# Patient Record
Sex: FEMALE | Race: WHITE | Employment: FULL TIME | URBAN - NONMETROPOLITAN AREA
[De-identification: names, ages, dates, MRNs, and addresses within clinical notes are randomized per-mention and may not be internally consistent; named-entity substitution may affect disease eponyms.]

---

## 2017-05-17 ENCOUNTER — OFFICE VISIT (OUTPATIENT)
Dept: FAMILY MEDICINE CLINIC | Age: 32
End: 2017-05-17

## 2017-05-17 VITALS
HEIGHT: 60 IN | WEIGHT: 129 LBS | DIASTOLIC BLOOD PRESSURE: 66 MMHG | SYSTOLIC BLOOD PRESSURE: 120 MMHG | HEART RATE: 82 BPM | BODY MASS INDEX: 25.32 KG/M2 | OXYGEN SATURATION: 98 %

## 2017-05-17 DIAGNOSIS — I47.1 PSVT (PAROXYSMAL SUPRAVENTRICULAR TACHYCARDIA) (HCC): Primary | ICD-10-CM

## 2017-05-17 DIAGNOSIS — R42 DIZZINESS: ICD-10-CM

## 2017-05-17 DIAGNOSIS — R53.83 OTHER FATIGUE: ICD-10-CM

## 2017-05-17 DIAGNOSIS — R29.898 LEFT ARM WEAKNESS: ICD-10-CM

## 2017-05-17 DIAGNOSIS — D68.51 FACTOR V LEIDEN (HCC): ICD-10-CM

## 2017-05-17 PROBLEM — I47.10 PSVT (PAROXYSMAL SUPRAVENTRICULAR TACHYCARDIA): Status: ACTIVE | Noted: 2017-05-17

## 2017-05-17 LAB
BASOPHILS ABSOLUTE: 0.1 K/UL (ref 0–0.2)
BASOPHILS RELATIVE PERCENT: 2.1 %
EOSINOPHILS ABSOLUTE: 0.3 K/UL (ref 0–0.6)
EOSINOPHILS RELATIVE PERCENT: 6.8 %
HCT VFR BLD CALC: 43.7 % (ref 36–48)
HEMOGLOBIN: 14.6 G/DL (ref 12–16)
LYMPHOCYTES ABSOLUTE: 1.2 K/UL (ref 1–5.1)
LYMPHOCYTES RELATIVE PERCENT: 23.8 %
MCH RBC QN AUTO: 30.6 PG (ref 26–34)
MCHC RBC AUTO-ENTMCNC: 33.5 G/DL (ref 31–36)
MCV RBC AUTO: 91.3 FL (ref 80–100)
MONOCYTES ABSOLUTE: 0.4 K/UL (ref 0–1.3)
MONOCYTES RELATIVE PERCENT: 7.8 %
NEUTROPHILS ABSOLUTE: 3 K/UL (ref 1.7–7.7)
NEUTROPHILS RELATIVE PERCENT: 59.5 %
PDW BLD-RTO: 13 % (ref 12.4–15.4)
PLATELET # BLD: 246 K/UL (ref 135–450)
PMV BLD AUTO: 10 FL (ref 5–10.5)
RBC # BLD: 4.79 M/UL (ref 4–5.2)
WBC # BLD: 5 K/UL (ref 4–11)

## 2017-05-17 PROCEDURE — 99214 OFFICE O/P EST MOD 30 MIN: CPT | Performed by: FAMILY MEDICINE

## 2017-05-17 PROCEDURE — 36415 COLL VENOUS BLD VENIPUNCTURE: CPT | Performed by: FAMILY MEDICINE

## 2017-05-17 RX ORDER — DILTIAZEM HYDROCHLORIDE 180 MG/1
180 CAPSULE, COATED, EXTENDED RELEASE ORAL DAILY
Qty: 30 CAPSULE | Refills: 3 | Status: SHIPPED | OUTPATIENT
Start: 2017-05-17 | End: 2017-12-01 | Stop reason: ALTCHOICE

## 2017-05-17 RX ORDER — CETIRIZINE HYDROCHLORIDE 10 MG/1
10 TABLET ORAL DAILY
COMMUNITY

## 2017-05-17 ASSESSMENT — ENCOUNTER SYMPTOMS
CONSTIPATION: 0
DIARRHEA: 0
TROUBLE SWALLOWING: 0

## 2017-05-18 LAB
A/G RATIO: 1.8 (ref 1.1–2.2)
ALBUMIN SERPL-MCNC: 4.9 G/DL (ref 3.4–5)
ALP BLD-CCNC: 67 U/L (ref 40–129)
ALT SERPL-CCNC: 10 U/L (ref 10–40)
ANION GAP SERPL CALCULATED.3IONS-SCNC: 16 MMOL/L (ref 3–16)
AST SERPL-CCNC: 12 U/L (ref 15–37)
BILIRUB SERPL-MCNC: <0.2 MG/DL (ref 0–1)
BUN BLDV-MCNC: 13 MG/DL (ref 7–20)
CALCIUM SERPL-MCNC: 9.7 MG/DL (ref 8.3–10.6)
CHLORIDE BLD-SCNC: 101 MMOL/L (ref 99–110)
CO2: 25 MMOL/L (ref 21–32)
CREAT SERPL-MCNC: <0.5 MG/DL (ref 0.6–1.1)
GFR AFRICAN AMERICAN: >60
GFR NON-AFRICAN AMERICAN: >60
GLOBULIN: 2.7 G/DL
GLUCOSE BLD-MCNC: 77 MG/DL (ref 70–99)
POTASSIUM SERPL-SCNC: 4.3 MMOL/L (ref 3.5–5.1)
SODIUM BLD-SCNC: 142 MMOL/L (ref 136–145)
T4 FREE: 1.2 NG/DL (ref 0.9–1.8)
TOTAL PROTEIN: 7.6 G/DL (ref 6.4–8.2)
TSH SERPL DL<=0.05 MIU/L-ACNC: 1.67 UIU/ML (ref 0.27–4.2)
VITAMIN B-12: 676 PG/ML (ref 211–911)

## 2017-05-26 DIAGNOSIS — R42 DIZZINESS: Primary | ICD-10-CM

## 2017-09-08 ENCOUNTER — OFFICE VISIT (OUTPATIENT)
Dept: FAMILY MEDICINE CLINIC | Age: 32
End: 2017-09-08

## 2017-09-08 VITALS
BODY MASS INDEX: 26.03 KG/M2 | DIASTOLIC BLOOD PRESSURE: 78 MMHG | HEART RATE: 102 BPM | TEMPERATURE: 98.2 F | OXYGEN SATURATION: 99 % | HEIGHT: 60 IN | WEIGHT: 132.6 LBS | SYSTOLIC BLOOD PRESSURE: 112 MMHG

## 2017-09-08 DIAGNOSIS — J01.00 ACUTE NON-RECURRENT MAXILLARY SINUSITIS: Primary | ICD-10-CM

## 2017-09-08 PROCEDURE — 99213 OFFICE O/P EST LOW 20 MIN: CPT | Performed by: NURSE PRACTITIONER

## 2017-09-08 RX ORDER — DOXYCYCLINE HYCLATE 100 MG/1
100 CAPSULE ORAL 2 TIMES DAILY
Qty: 14 CAPSULE | Refills: 0 | Status: SHIPPED | OUTPATIENT
Start: 2017-09-08 | End: 2017-12-01 | Stop reason: SDUPTHER

## 2017-09-08 ASSESSMENT — ENCOUNTER SYMPTOMS
SINUS PRESSURE: 1
VOMITING: 0
CHEST TIGHTNESS: 1
SORE THROAT: 0
EYES NEGATIVE: 1
COUGH: 1
NAUSEA: 1

## 2017-11-16 ENCOUNTER — OFFICE VISIT (OUTPATIENT)
Dept: CARDIOLOGY CLINIC | Age: 32
End: 2017-11-16

## 2017-11-16 VITALS
HEART RATE: 88 BPM | WEIGHT: 137 LBS | BODY MASS INDEX: 26.9 KG/M2 | DIASTOLIC BLOOD PRESSURE: 68 MMHG | SYSTOLIC BLOOD PRESSURE: 102 MMHG | OXYGEN SATURATION: 98 % | HEIGHT: 60 IN

## 2017-11-16 DIAGNOSIS — I47.1 PSVT (PAROXYSMAL SUPRAVENTRICULAR TACHYCARDIA) (HCC): Primary | ICD-10-CM

## 2017-11-16 PROCEDURE — G8417 CALC BMI ABV UP PARAM F/U: HCPCS | Performed by: INTERNAL MEDICINE

## 2017-11-16 PROCEDURE — G8484 FLU IMMUNIZE NO ADMIN: HCPCS | Performed by: INTERNAL MEDICINE

## 2017-11-16 PROCEDURE — G8427 DOCREV CUR MEDS BY ELIG CLIN: HCPCS | Performed by: INTERNAL MEDICINE

## 2017-11-16 PROCEDURE — 1036F TOBACCO NON-USER: CPT | Performed by: INTERNAL MEDICINE

## 2017-11-16 PROCEDURE — 99214 OFFICE O/P EST MOD 30 MIN: CPT | Performed by: INTERNAL MEDICINE

## 2017-11-16 RX ORDER — METOPROLOL SUCCINATE 25 MG/1
25 TABLET, EXTENDED RELEASE ORAL DAILY
COMMUNITY
End: 2018-11-20 | Stop reason: SDUPTHER

## 2017-11-16 NOTE — PROGRESS NOTES
to continue this route. Dr. Hortensia Edgar recommended anticoagulation only if has 2nd episode of thrombosis. She now wants guidance from me regarding how to proceed. Past Medical History:   has a past medical history of Blood clot in vein and SVT (supraventricular tachycardia) ([address] Surgical History:   has a past surgical history that includes  section and Tonsillectomy. Social History:   She lives at home with her spouse and 3 children. She reports that she has never smoked. She does not have any smokeless tobacco history on file. She reports that she does not drink alcohol. Family History:  family history includes High Blood Pressure in her mother. Home Medications:  Prior to Admission medications    Medication Sig Start Date End Date Taking? Authorizing Provider   metoprolol (LOPRESSOR) 25 MG tablet Take 25 mg by mouth daily. Yes Historical Provider, MD        Allergies:  Dilaudid [hydromorphone hcl]; Amoxicillin; Bactrim [sulfamethoxazole-trimethoprim]; Ceclor [cefaclor]; Cephalexin; Clindamycin/lincomycin; Sulfa antibiotics; and Zithromax [azithromycin]     Review of Systems:   · Constitutional: there has been no unanticipated weight loss. There's been no change in energy level, sleep pattern, or activity level. · Eyes: No visual changes or diplopia. No scleral icterus. · ENT: No Headaches, hearing loss or vertigo. No mouth sores or sore throat. · Cardiovascular: Reviewed in HPI  · Respiratory: No cough or wheezing, no sputum production. No hematemesis. · Gastrointestinal: No abdominal pain, appetite loss, blood in stools. No change in bowel or bladder habits. · Genitourinary: No dysuria, trouble voiding, or hematuria. · Musculoskeletal:  No gait disturbance, weakness or joint complaints. · Integumentary: No rash or pruritis. · Neurological: No headache, diplopia, change in muscle strength, numbness or tingling.  No change in gait, balance, coordination, mood, affect, regarding thrombosis potential then she wants ablation and I believe this is reasonable given young age and still having symptoms despite medication. 2. SVT (supraventricular tachycardia):  See #1 above. Most recent EKG 1/9/17 ectopic atrial tachycardia; nonspecific ST changeNote EKG 4/12/2016 showed ectopic atrial rhythm. 3.  Chest pain: Likely related to tachycardia. Low suspicion for CAD in young female with no CAD risk factors. Plan:   1. Advised to follow up with Dr Tammy Fracno and ask his opinion about SVT ablation any complications with blood clots or bleeding issues. Once released she should proceed with ablation if felt OK from hematology standpoint and I will refer back to Dr. Demarcus Falcon and d/w him. 2.  Follow up with Dr Demarcus Falcon  for further evaluation and scheduling ablation  3. Follow up with me if needed after EP sees. Cost of prescription medications and patient compliance have been reviewed with patient. All questions answered. Thank you for allowing me to participate in the care of this individual.    Emma Ace.  Romario Walters M.D., [facility] - [place], [place]

## 2017-11-16 NOTE — PATIENT INSTRUCTIONS
Plan:   1. Advised to follow up with Dr Enoch Noble and ask his opinion about SVT ablation any complications with blood clots or bleeding issues. Once released she should proceed with abaltion  2. Follow up with Dr Víctor Weaver  for further evaluation and scheduling ablation  3.  Follow up with me prn

## 2017-11-17 ENCOUNTER — TELEPHONE (OUTPATIENT)
Dept: CARDIOLOGY CLINIC | Age: 32
End: 2017-11-17

## 2017-11-17 NOTE — TELEPHONE ENCOUNTER
SALEEM  Pt was wanted to let smm know that her Metoprolol was the Metoprolol Succinate. Please call if need to.

## 2017-11-28 NOTE — COMMUNICATION BODY
[facility]   Cardiac Followup    Referring Provider:  Boris Keenan MD     Chief Complaint   Patient presents with    1 Year Follow Up     ekg 01/09/2017    Results     carotid 06/01/2017    Discuss Labs     cmp 05/17/2017    Chest Pain     here & there    Dizziness    Numbness     left arm & left leg tingling    Fatigue     no energy      Subjective. Patient being seen today for cardiology follow up of SVT, palpitations    History of Present Illness:   Mrs. Letitia Guthrie is a 28yo female who has hx of SVT related to pregnancy and I first saw patient 12 year ago. 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 Nelson Ordaz and did not f/u afterwards. Sounds like she had testing for hypercoagulable d/o but she is not aware of any diagnosis. She was seen at [facility] ER on 6/14/12 for palpitations. She was given Lopressor 2.5 mg IV for what was diagnosed as sinus tachycardia. All her labs, urine, x-rays were WNL. Her EKG was NSR- bpm as high as 129 bpm.  The 2 EKGs demonstrated sinus tach and no evidence for SVT. Just followed clinically without meds at that time. Note EKG 4/12/2016 showed probable ectopic atrial rhythm 98bpm. Most recent 2 week monitor 10/11-10/24/16 showed narrow complex tachycardia HR 80-150bpm.  Most recent EKG 1/9/17 ectopic atrial tachycardia; nonspecific ST change. Most recent carotid US 6/1/17 was essentially normal.   Today, she reports having bouts of palpitations with heart racing daily still that bother her. She reports chest discomfort and dizziness with palps and \"fell\" down last week due to symptoms. No definite LOC. She reports last episode lasted about 1/2 hr.   She was seen by EP and was afraid of ablation and pursued 2nd opinion with Dr Alexia Eng. He was concerned about her thrombosis history and referred her to heme/onc Dr. Earnest Mcdonald and his EP partner.  The EP doc recommended staying on medication but she does not want to continue this route. Dr. Bisi Watson recommended anticoagulation only if has 2nd episode of thrombosis. She now wants guidance from me regarding how to proceed. Past Medical History:   has a past medical history of Blood clot in vein and SVT (supraventricular tachycardia) ([address] Surgical History:   has a past surgical history that includes  section and Tonsillectomy. Social History:   She lives at home with her spouse and 3 children. She reports that she has never smoked. She does not have any smokeless tobacco history on file. She reports that she does not drink alcohol. Family History:  family history includes High Blood Pressure in her mother. Home Medications:  Prior to Admission medications    Medication Sig Start Date End Date Taking? Authorizing Provider   metoprolol (LOPRESSOR) 25 MG tablet Take 25 mg by mouth daily. Yes Historical Provider, MD        Allergies:  Dilaudid [hydromorphone hcl]; Amoxicillin; Bactrim [sulfamethoxazole-trimethoprim]; Ceclor [cefaclor]; Cephalexin; Clindamycin/lincomycin; Sulfa antibiotics; and Zithromax [azithromycin]     Review of Systems:   · Constitutional: there has been no unanticipated weight loss. There's been no change in energy level, sleep pattern, or activity level. · Eyes: No visual changes or diplopia. No scleral icterus. · ENT: No Headaches, hearing loss or vertigo. No mouth sores or sore throat. · Cardiovascular: Reviewed in HPI  · Respiratory: No cough or wheezing, no sputum production. No hematemesis. · Gastrointestinal: No abdominal pain, appetite loss, blood in stools. No change in bowel or bladder habits. · Genitourinary: No dysuria, trouble voiding, or hematuria. · Musculoskeletal:  No gait disturbance, weakness or joint complaints. · Integumentary: No rash or pruritis. · Neurological: No headache, diplopia, change in muscle strength, numbness or tingling.  No change in gait, balance, coordination, mood, affect, memory, mentation, behavior. · Psychiatric: No anxiety, no depression. · Endocrine: No malaise, fatigue or temperature intolerance. No excessive thirst, fluid intake, or urination. No tremor. · Hematologic/Lymphatic: No abnormal bruising or bleeding, blood clots or swollen lymph nodes. · Allergic/Immunologic: No nasal congestion or hives. Physical Examination:    Vitals:    11/16/17 1546   BP: 102/68   Pulse: 88   SpO2: 98%        Constitutional and General Appearance: NAD   Respiratory:  · Normal excursion and expansion without use of accessory muscles  · Resp Auscultation: Normal breath sounds without dullness  Cardiovascular:  · The apical impulses not displaced  · Heart tones are crisp and normal  · Cervical veins are not engorged  · The carotid upstroke is normal in amplitude and contour without delay or bruit  · Normal S1S2, No S3, No Murmur  · Peripheral pulses are symmetrical and full  · There is no clubbing, cyanosis of the extremities. · No edema  · Femoral Arteries: 2+ and equal  · Pedal Pulses: 2+ and equal   Abdomen:  · No masses or tenderness  · Liver/Spleen: No Abnormalities Noted  Neurological/Psychiatric:  · Alert and oriented in all spheres  · Moves all extremities well  · Exhibits normal gait balance and coordination  · No abnormalities of mood, affect, memory, mentation, or behavior are noted      Assessment:     1. Palpitations: Worsening symptoms which are due to atrial tachycardia/SVT likely and she is symptomatic despite medical management. She has bouts of palpitations with heart racing daily still that bother her. She reports chest discomfort and dizziness with palps and \"fell\" down last week due to symptoms. Second EP opinion wanted only med mgt but she does not want to continue this path. She is seeing Dr. Nita Jernigan (heme/onc) next Tuesday. I left  explaining situation and he will call me after seeing her.  If he is OK with ablation proceeding and no significant issues

## 2017-12-01 ENCOUNTER — OFFICE VISIT (OUTPATIENT)
Dept: FAMILY MEDICINE CLINIC | Age: 32
End: 2017-12-01

## 2017-12-01 VITALS
HEART RATE: 120 BPM | WEIGHT: 138.6 LBS | SYSTOLIC BLOOD PRESSURE: 118 MMHG | OXYGEN SATURATION: 98 % | DIASTOLIC BLOOD PRESSURE: 82 MMHG | HEIGHT: 60 IN | BODY MASS INDEX: 27.21 KG/M2

## 2017-12-01 DIAGNOSIS — J01.00 ACUTE NON-RECURRENT MAXILLARY SINUSITIS: ICD-10-CM

## 2017-12-01 PROCEDURE — G8484 FLU IMMUNIZE NO ADMIN: HCPCS | Performed by: FAMILY MEDICINE

## 2017-12-01 PROCEDURE — G8427 DOCREV CUR MEDS BY ELIG CLIN: HCPCS | Performed by: FAMILY MEDICINE

## 2017-12-01 PROCEDURE — G8417 CALC BMI ABV UP PARAM F/U: HCPCS | Performed by: FAMILY MEDICINE

## 2017-12-01 PROCEDURE — 99214 OFFICE O/P EST MOD 30 MIN: CPT | Performed by: FAMILY MEDICINE

## 2017-12-01 PROCEDURE — 1036F TOBACCO NON-USER: CPT | Performed by: FAMILY MEDICINE

## 2017-12-01 RX ORDER — PREDNISONE 20 MG/1
40 TABLET ORAL DAILY
Qty: 6 TABLET | Refills: 0 | Status: SHIPPED | OUTPATIENT
Start: 2017-12-01 | End: 2017-12-04

## 2017-12-01 RX ORDER — DOXYCYCLINE HYCLATE 100 MG/1
100 CAPSULE ORAL 2 TIMES DAILY
Qty: 20 CAPSULE | Refills: 0 | Status: SHIPPED | OUTPATIENT
Start: 2017-12-01 | End: 2017-12-11 | Stop reason: ALTCHOICE

## 2017-12-01 RX ORDER — BENZONATATE 200 MG/1
200 CAPSULE ORAL 3 TIMES DAILY PRN
Qty: 30 CAPSULE | Refills: 0 | Status: SHIPPED | OUTPATIENT
Start: 2017-12-01 | End: 2017-12-11 | Stop reason: ALTCHOICE

## 2017-12-01 ASSESSMENT — ENCOUNTER SYMPTOMS
SORE THROAT: 0
WHEEZING: 0
COUGH: 1
SHORTNESS OF BREATH: 1

## 2017-12-11 ENCOUNTER — OFFICE VISIT (OUTPATIENT)
Dept: CARDIOLOGY CLINIC | Age: 32
End: 2017-12-11

## 2017-12-11 VITALS
SYSTOLIC BLOOD PRESSURE: 100 MMHG | HEART RATE: 104 BPM | DIASTOLIC BLOOD PRESSURE: 82 MMHG | BODY MASS INDEX: 27.42 KG/M2 | WEIGHT: 136 LBS | HEIGHT: 59 IN

## 2017-12-11 DIAGNOSIS — I47.1 PSVT (PAROXYSMAL SUPRAVENTRICULAR TACHYCARDIA) (HCC): Primary | ICD-10-CM

## 2017-12-11 PROCEDURE — G8484 FLU IMMUNIZE NO ADMIN: HCPCS | Performed by: INTERNAL MEDICINE

## 2017-12-11 PROCEDURE — 99215 OFFICE O/P EST HI 40 MIN: CPT | Performed by: INTERNAL MEDICINE

## 2017-12-11 PROCEDURE — 1036F TOBACCO NON-USER: CPT | Performed by: INTERNAL MEDICINE

## 2017-12-11 PROCEDURE — 93000 ELECTROCARDIOGRAM COMPLETE: CPT | Performed by: INTERNAL MEDICINE

## 2017-12-11 PROCEDURE — G8427 DOCREV CUR MEDS BY ELIG CLIN: HCPCS | Performed by: INTERNAL MEDICINE

## 2017-12-11 PROCEDURE — G8417 CALC BMI ABV UP PARAM F/U: HCPCS | Performed by: INTERNAL MEDICINE

## 2017-12-11 NOTE — PATIENT INSTRUCTIONS
RECOMMENDATIONS:     1. Discussed EP study with possible ablation treatment   2. Risk, benefit, alternative, rationale of the procedure were discussed with the patient which included but were not limited to allergic reaction to the medications, pain, bleeding, infection, nerve injury, injury to diaphragm(breathing muscle), pulmonary embolus(blood clot in lungs), deep vein blood clot, pneumothorax, hemothorax, acute renal failure, cardiac perforation,  tamponade, need for emergent surgery (open heart), need for future surgery, permanent pacemaker, pulmonary vein stenosis, left atrial to esophageal fistula, stroke, myocardial infarction and death. Given the complex nature of the disease no guarantee was given on the success of the procedure. Explained to patient that discontinuation of anticoagulation is not an indication for the procedure. 3. Stay well hydrated  4.  Follow up after procedure

## 2017-12-11 NOTE — PROGRESS NOTES
[azithromycin]     Social History:  reports that she has never smoked. She has never used smokeless tobacco. She reports that she does not drink alcohol.      Family History: family history includes High Blood Pressure in her mother.         REVIEW OF SYSTEMS:    · Constitutional: there has been no unanticipated weight loss. There's been no change in energy level, sleep pattern, or activity level.    · Eyes: No visual changes or diplopia. No scleral icterus. · ENT: No Headaches, hearing loss or vertigo. No mouth sores or sore throat. · Cardiovascular: No for chest pain, No for dyspnea on exertion, Yes for palpitations or No for loss of consciousness. No cough, hemoptysis, No for pleuritic pain, or phlebitis. · Respiratory: No for cough or wheezing, no sputum production. No hematemesis.    · Gastrointestinal: No abdominal pain, appetite loss, blood in stools. No change in bowel or bladder habits. · Genitourinary: No dysuria, trouble voiding, or hematuria. · Musculoskeletal: No gait disturbance, No for weakness or joint complaints. · Integumentary: No rash or pruritis. · Neurological: No headache, diplopia, change in muscle strength, numbness or tingling. No change in gait, balance, coordination, mood, affect, memory, mentation, behavior. · Psychiatric: No anxiety, or depression. · Endocrine: No temperature intolerance. No excessive thirst, fluid intake, or urination. No tremor. · Hematologic/Lymphatic: No abnormal bruising or bleeding, blood clots or swollen lymph nodes. · Allergic/Immunologic: No nasal congestion or hives.     Physical Examination:    Vitals:    12/11/17 1612   BP: 100/82   Pulse: 104         Constitutional and General Appearance:    alert, cooperative, no distress and appears stated age  [de-identified]:  PERRLA, no cervical lymphadenopathy. No masses palpable.  Normal oral  mucosa  Respiratory:  · Normal excursion and expansion without use of accessory muscles  · Resp Auscultation: Normal breath

## 2017-12-28 NOTE — TELEPHONE ENCOUNTER
Patient is scheduled with Dr. Demarcus Falcon for SVT Ablation on 1/12/18 at [facility], arrival time of 6:30am to the Cath Lab. Please have patient arrive to the main entrance of [facility] and check in with the registration desk. Please call patient regarding medication instructions. Per EP Sheet - Hold Metoprolol 2 days prior.

## 2017-12-29 ENCOUNTER — TELEPHONE (OUTPATIENT)
Dept: CARDIOLOGY CLINIC | Age: 32
End: 2017-12-29

## 2017-12-29 NOTE — TELEPHONE ENCOUNTER
Just to clarify, the patient was prescribed Eliquis by Dr. Omar Diamond, her hematologist for Factor V. It was not prescribed by Dr. Santos Varma and he is not managing it. She has SVT that does not require anticoagulation however Lovenox was prescribed to cover her Factor V while off Eliquis. I will ask Dr. Santos Varma to address the rest of this message when he returns.

## 2018-01-05 LAB
CREAT SERPL-MCNC: 0.69 MG/DL
POTASSIUM (K+): 3.4

## 2018-01-15 DIAGNOSIS — I47.1 SVT (SUPRAVENTRICULAR TACHYCARDIA) (HCC): Primary | ICD-10-CM

## 2018-01-31 ENCOUNTER — TELEPHONE (OUTPATIENT)
Dept: CARDIOLOGY CLINIC | Age: 33
End: 2018-01-31

## 2018-01-31 NOTE — TELEPHONE ENCOUNTER
I would think Supra ventricular tachycardia ablation since she can go into tachycardia during surgery

## 2018-02-05 ENCOUNTER — TELEPHONE (OUTPATIENT)
Dept: CARDIOLOGY CLINIC | Age: 33
End: 2018-02-05

## 2018-02-05 NOTE — LETTER
[address] [place] [address] Cardiology - [address] [place] [address]  Phone: [phone]  Fax: [phone]        2018     RE: Austin Linda   : 1985    Patient is low risk clinically for intermediate risk surgery. Proceed as planned without further cardiac work up. If you have any questions or concerns, please don't hesitate to call.     Sincerely,      Lila Zabala MD

## 2018-02-06 NOTE — TELEPHONE ENCOUNTER
Spoke with Norberto Gudino at Dr. Maryann Vicente office. Cardiac clearance letter faxed [facility] - [phone].

## 2018-02-06 NOTE — TELEPHONE ENCOUNTER
Dr. Jakub Vasquez is her regular cardiologist. I am ok with her going for hysterectomy without further cardiac w/u

## 2018-04-25 ENCOUNTER — OFFICE VISIT (OUTPATIENT)
Dept: FAMILY MEDICINE CLINIC | Age: 33
End: 2018-04-25

## 2018-04-25 VITALS
BODY MASS INDEX: 28.02 KG/M2 | OXYGEN SATURATION: 99 % | HEART RATE: 111 BPM | DIASTOLIC BLOOD PRESSURE: 80 MMHG | WEIGHT: 139 LBS | HEIGHT: 59 IN | SYSTOLIC BLOOD PRESSURE: 124 MMHG

## 2018-04-25 DIAGNOSIS — I47.1 SVT (SUPRAVENTRICULAR TACHYCARDIA) (HCC): ICD-10-CM

## 2018-04-25 DIAGNOSIS — Z00.00 WELL ADULT EXAM: Primary | ICD-10-CM

## 2018-04-25 PROCEDURE — 99395 PREV VISIT EST AGE 18-39: CPT | Performed by: NURSE PRACTITIONER

## 2018-05-01 ENCOUNTER — NURSE ONLY (OUTPATIENT)
Dept: FAMILY MEDICINE CLINIC | Age: 33
End: 2018-05-01

## 2018-05-01 DIAGNOSIS — Z11.1 ENCOUNTER FOR PPD SKIN TEST READING: Primary | ICD-10-CM

## 2018-05-01 PROCEDURE — 86580 TB INTRADERMAL TEST: CPT | Performed by: NURSE PRACTITIONER

## 2018-05-03 ENCOUNTER — NURSE ONLY (OUTPATIENT)
Dept: FAMILY MEDICINE CLINIC | Age: 33
End: 2018-05-03

## 2018-05-03 DIAGNOSIS — Z11.1 ENCOUNTER FOR PPD SKIN TEST READING: Primary | ICD-10-CM

## 2018-05-03 LAB
INDURATION: NORMAL
TB SKIN TEST: NEGATIVE

## 2018-05-15 ENCOUNTER — NURSE ONLY (OUTPATIENT)
Dept: FAMILY MEDICINE CLINIC | Age: 33
End: 2018-05-15

## 2018-05-15 DIAGNOSIS — Z11.1 ENCOUNTER FOR PPD TEST: Primary | ICD-10-CM

## 2018-05-15 DIAGNOSIS — Z02.0 ENCOUNTER FOR SCHOOL HISTORY AND PHYSICAL EXAMINATION: ICD-10-CM

## 2018-05-15 PROCEDURE — 86580 TB INTRADERMAL TEST: CPT | Performed by: NURSE PRACTITIONER

## 2018-05-16 LAB — HBV SURFACE AB TITR SER: <3.5 MIU/ML

## 2018-05-17 ENCOUNTER — NURSE ONLY (OUTPATIENT)
Dept: FAMILY MEDICINE CLINIC | Age: 33
End: 2018-05-17

## 2018-05-17 DIAGNOSIS — Z11.1 ENCOUNTER FOR PPD SKIN TEST READING: Primary | ICD-10-CM

## 2018-05-17 LAB
INDURATION: NORMAL
TB SKIN TEST: NEGATIVE
VZV IGG SER QL IA: 2952 IV

## 2018-06-04 ENCOUNTER — OFFICE VISIT (OUTPATIENT)
Dept: FAMILY MEDICINE CLINIC | Age: 33
End: 2018-06-04

## 2018-06-04 VITALS
WEIGHT: 141 LBS | DIASTOLIC BLOOD PRESSURE: 76 MMHG | SYSTOLIC BLOOD PRESSURE: 110 MMHG | OXYGEN SATURATION: 98 % | HEART RATE: 106 BPM | BODY MASS INDEX: 28.43 KG/M2 | HEIGHT: 59 IN

## 2018-06-04 DIAGNOSIS — N30.00 ACUTE CYSTITIS WITHOUT HEMATURIA: ICD-10-CM

## 2018-06-04 DIAGNOSIS — M54.50 BILATERAL LOW BACK PAIN WITHOUT SCIATICA, UNSPECIFIED CHRONICITY: Primary | ICD-10-CM

## 2018-06-04 LAB
BILIRUBIN, POC: NORMAL
BLOOD URINE, POC: NORMAL
CLARITY, POC: CLEAR
COLOR, POC: YELLOW
GLUCOSE URINE, POC: NORMAL
KETONES, POC: NORMAL
LEUKOCYTE EST, POC: NORMAL
NITRITE, POC: NORMAL
PH, POC: 6.5
PROTEIN, POC: NORMAL
SPECIFIC GRAVITY, POC: 1.01
UROBILINOGEN, POC: 0.2

## 2018-06-04 PROCEDURE — 99213 OFFICE O/P EST LOW 20 MIN: CPT | Performed by: NURSE PRACTITIONER

## 2018-06-04 PROCEDURE — 81002 URINALYSIS NONAUTO W/O SCOPE: CPT | Performed by: NURSE PRACTITIONER

## 2018-06-04 RX ORDER — NAPROXEN 500 MG/1
500 TABLET ORAL 2 TIMES DAILY WITH MEALS
Qty: 60 TABLET | Refills: 3 | Status: SHIPPED | OUTPATIENT
Start: 2018-06-04 | End: 2019-01-22 | Stop reason: ALTCHOICE

## 2018-06-04 RX ORDER — CIPROFLOXACIN 500 MG/1
500 TABLET, FILM COATED ORAL 2 TIMES DAILY
Qty: 10 TABLET | Refills: 0 | Status: SHIPPED | OUTPATIENT
Start: 2018-06-04 | End: 2018-06-09

## 2018-06-04 RX ORDER — CYCLOBENZAPRINE HCL 5 MG
5 TABLET ORAL 3 TIMES DAILY PRN
Qty: 30 TABLET | Refills: 0 | Status: SHIPPED | OUTPATIENT
Start: 2018-06-04 | End: 2018-06-14

## 2018-06-04 ASSESSMENT — PATIENT HEALTH QUESTIONNAIRE - PHQ9
SUM OF ALL RESPONSES TO PHQ9 QUESTIONS 1 & 2: 0
1. LITTLE INTEREST OR PLEASURE IN DOING THINGS: 0
SUM OF ALL RESPONSES TO PHQ QUESTIONS 1-9: 0
2. FEELING DOWN, DEPRESSED OR HOPELESS: 0

## 2018-06-04 ASSESSMENT — ENCOUNTER SYMPTOMS
NAUSEA: 1
BACK PAIN: 1
EYES NEGATIVE: 1
RESPIRATORY NEGATIVE: 1
ALLERGIC/IMMUNOLOGIC NEGATIVE: 1
VOMITING: 1

## 2018-06-06 LAB
ORGANISM: ABNORMAL
URINE CULTURE, ROUTINE: ABNORMAL
URINE CULTURE, ROUTINE: ABNORMAL

## 2018-06-06 RX ORDER — FLUCONAZOLE 100 MG/1
100 TABLET ORAL DAILY
Qty: 3 TABLET | Refills: 0 | Status: SHIPPED | OUTPATIENT
Start: 2018-06-06 | End: 2018-06-09

## 2018-11-20 RX ORDER — METOPROLOL SUCCINATE 25 MG/1
25 TABLET, EXTENDED RELEASE ORAL DAILY
Qty: 90 TABLET | Refills: 0 | Status: SHIPPED | OUTPATIENT
Start: 2018-11-20 | End: 2019-01-18 | Stop reason: SDUPTHER

## 2019-01-18 RX ORDER — METOPROLOL SUCCINATE 25 MG/1
25 TABLET, EXTENDED RELEASE ORAL DAILY
Qty: 90 TABLET | Refills: 3 | Status: SHIPPED | OUTPATIENT
Start: 2019-01-18 | End: 2019-01-31 | Stop reason: ALTCHOICE

## 2019-01-22 ENCOUNTER — HOSPITAL ENCOUNTER (EMERGENCY)
Age: 34
Discharge: HOME | End: 2019-01-22
Attending: EMERGENCY MEDICINE
Payer: COMMERCIAL

## 2019-01-22 ENCOUNTER — APPOINTMENT (OUTPATIENT)
Dept: GENERAL RADIOLOGY | Age: 34
End: 2019-01-22
Payer: COMMERCIAL

## 2019-01-22 ENCOUNTER — APPOINTMENT (OUTPATIENT)
Dept: CT IMAGING | Age: 34
End: 2019-01-22
Payer: COMMERCIAL

## 2019-01-22 VITALS
RESPIRATION RATE: 16 BRPM | HEIGHT: 60 IN | SYSTOLIC BLOOD PRESSURE: 113 MMHG | BODY MASS INDEX: 27.48 KG/M2 | DIASTOLIC BLOOD PRESSURE: 94 MMHG | HEART RATE: 92 BPM | OXYGEN SATURATION: 100 % | WEIGHT: 140 LBS | TEMPERATURE: 97.8 F

## 2019-01-22 DIAGNOSIS — I47.1 ATRIAL TACHYCARDIA (HCC): Primary | ICD-10-CM

## 2019-01-22 LAB
A/G RATIO: 1.2 (ref 1.1–2.2)
ALBUMIN SERPL-MCNC: 4.3 G/DL (ref 3.4–5)
ALP BLD-CCNC: 82 U/L (ref 40–129)
ALT SERPL-CCNC: 10 U/L (ref 10–40)
ANION GAP SERPL CALCULATED.3IONS-SCNC: 11 MMOL/L (ref 3–16)
AST SERPL-CCNC: 12 U/L (ref 15–37)
BASOPHILS ABSOLUTE: 0.1 K/UL (ref 0–0.2)
BASOPHILS RELATIVE PERCENT: 1 %
BILIRUB SERPL-MCNC: 0.3 MG/DL (ref 0–1)
BUN BLDV-MCNC: 8 MG/DL (ref 7–20)
CALCIUM SERPL-MCNC: 9.2 MG/DL (ref 8.3–10.6)
CHLORIDE BLD-SCNC: 101 MMOL/L (ref 99–110)
CO2: 26 MMOL/L (ref 21–32)
CREAT SERPL-MCNC: <0.5 MG/DL (ref 0.6–1.1)
EKG ATRIAL RATE: 256 BPM
EKG DIAGNOSIS: NORMAL
EKG P AXIS: 258 DEGREES
EKG Q-T INTERVAL: 254 MS
EKG QRS DURATION: 82 MS
EKG QTC CALCULATION (BAZETT): 370 MS
EKG R AXIS: 63 DEGREES
EKG T AXIS: -46 DEGREES
EKG VENTRICULAR RATE: 128 BPM
EOSINOPHILS ABSOLUTE: 0.2 K/UL (ref 0–0.6)
EOSINOPHILS RELATIVE PERCENT: 3.1 %
GFR AFRICAN AMERICAN: >60
GFR NON-AFRICAN AMERICAN: >60
GLOBULIN: 3.6 G/DL
GLUCOSE BLD-MCNC: 97 MG/DL (ref 70–99)
HCT VFR BLD CALC: 40.9 % (ref 36–48)
HEMOGLOBIN: 13.9 G/DL (ref 12–16)
LYMPHOCYTES ABSOLUTE: 1.8 K/UL (ref 1–5.1)
LYMPHOCYTES RELATIVE PERCENT: 26.4 %
MCH RBC QN AUTO: 30.5 PG (ref 26–34)
MCHC RBC AUTO-ENTMCNC: 34 G/DL (ref 31–36)
MCV RBC AUTO: 89.8 FL (ref 80–100)
MONOCYTES ABSOLUTE: 0.5 K/UL (ref 0–1.3)
MONOCYTES RELATIVE PERCENT: 7.7 %
NEUTROPHILS ABSOLUTE: 4.3 K/UL (ref 1.7–7.7)
NEUTROPHILS RELATIVE PERCENT: 61.8 %
PDW BLD-RTO: 12.7 % (ref 12.4–15.4)
PLATELET # BLD: 258 K/UL (ref 135–450)
PMV BLD AUTO: 8.7 FL (ref 5–10.5)
POTASSIUM SERPL-SCNC: 3.4 MMOL/L (ref 3.5–5.1)
RBC # BLD: 4.56 M/UL (ref 4–5.2)
SODIUM BLD-SCNC: 138 MMOL/L (ref 136–145)
TOTAL PROTEIN: 7.9 G/DL (ref 6.4–8.2)
TROPONIN: <0.01 NG/ML
WBC # BLD: 6.9 K/UL (ref 4–11)

## 2019-01-22 PROCEDURE — 85025 COMPLETE CBC W/AUTO DIFF WBC: CPT

## 2019-01-22 PROCEDURE — 71045 X-RAY EXAM CHEST 1 VIEW: CPT

## 2019-01-22 PROCEDURE — 93005 ELECTROCARDIOGRAM TRACING: CPT | Performed by: EMERGENCY MEDICINE

## 2019-01-22 PROCEDURE — 96360 HYDRATION IV INFUSION INIT: CPT

## 2019-01-22 PROCEDURE — 93010 ELECTROCARDIOGRAM REPORT: CPT | Performed by: INTERNAL MEDICINE

## 2019-01-22 PROCEDURE — 84484 ASSAY OF TROPONIN QUANT: CPT

## 2019-01-22 PROCEDURE — 6370000000 HC RX 637 (ALT 250 FOR IP): Performed by: EMERGENCY MEDICINE

## 2019-01-22 PROCEDURE — 99285 EMERGENCY DEPT VISIT HI MDM: CPT

## 2019-01-22 PROCEDURE — 6360000004 HC RX CONTRAST MEDICATION: Performed by: EMERGENCY MEDICINE

## 2019-01-22 PROCEDURE — 71260 CT THORAX DX C+: CPT

## 2019-01-22 PROCEDURE — 2580000003 HC RX 258: Performed by: EMERGENCY MEDICINE

## 2019-01-22 PROCEDURE — 80053 COMPREHEN METABOLIC PANEL: CPT

## 2019-01-22 RX ORDER — DILTIAZEM HYDROCHLORIDE 120 MG/1
120 CAPSULE, COATED, EXTENDED RELEASE ORAL DAILY
Qty: 30 CAPSULE | Refills: 0 | Status: SHIPPED | OUTPATIENT
Start: 2019-01-22 | End: 2019-01-23 | Stop reason: DRUGHIGH

## 2019-01-22 RX ORDER — KETOROLAC TROMETHAMINE 30 MG/ML
15 INJECTION, SOLUTION INTRAMUSCULAR; INTRAVENOUS ONCE
Status: DISCONTINUED | OUTPATIENT
Start: 2019-01-22 | End: 2019-01-22 | Stop reason: HOSPADM

## 2019-01-22 RX ORDER — DILTIAZEM HYDROCHLORIDE 120 MG/1
120 CAPSULE, COATED, EXTENDED RELEASE ORAL ONCE
Status: COMPLETED | OUTPATIENT
Start: 2019-01-22 | End: 2019-01-22

## 2019-01-22 RX ORDER — 0.9 % SODIUM CHLORIDE 0.9 %
1000 INTRAVENOUS SOLUTION INTRAVENOUS ONCE
Status: COMPLETED | OUTPATIENT
Start: 2019-01-22 | End: 2019-01-22

## 2019-01-22 RX ADMIN — SODIUM CHLORIDE 1000 ML: 9 INJECTION, SOLUTION INTRAVENOUS at 14:02

## 2019-01-22 RX ADMIN — IOPAMIDOL 75 ML: 755 INJECTION, SOLUTION INTRAVENOUS at 13:33

## 2019-01-22 RX ADMIN — DILTIAZEM HYDROCHLORIDE 120 MG: 120 CAPSULE, COATED, EXTENDED RELEASE ORAL at 16:31

## 2019-01-22 ASSESSMENT — PAIN DESCRIPTION - PAIN TYPE: TYPE: ACUTE PAIN

## 2019-01-22 ASSESSMENT — PAIN DESCRIPTION - LOCATION: LOCATION: CHEST

## 2019-01-22 ASSESSMENT — PAIN SCALES - GENERAL: PAINLEVEL_OUTOF10: 3

## 2019-01-23 ENCOUNTER — OFFICE VISIT (OUTPATIENT)
Dept: FAMILY MEDICINE CLINIC | Age: 34
End: 2019-01-23
Payer: COMMERCIAL

## 2019-01-23 ENCOUNTER — TELEPHONE (OUTPATIENT)
Dept: CARDIOLOGY CLINIC | Age: 34
End: 2019-01-23

## 2019-01-23 VITALS
SYSTOLIC BLOOD PRESSURE: 110 MMHG | HEIGHT: 59 IN | TEMPERATURE: 97.8 F | HEART RATE: 147 BPM | BODY MASS INDEX: 29.92 KG/M2 | DIASTOLIC BLOOD PRESSURE: 80 MMHG | OXYGEN SATURATION: 99 % | WEIGHT: 148.4 LBS

## 2019-01-23 DIAGNOSIS — I47.1 PSVT (PAROXYSMAL SUPRAVENTRICULAR TACHYCARDIA) (HCC): Primary | ICD-10-CM

## 2019-01-23 DIAGNOSIS — J06.9 UPPER RESPIRATORY TRACT INFECTION, UNSPECIFIED TYPE: ICD-10-CM

## 2019-01-23 DIAGNOSIS — R00.0 TACHYCARDIA: Primary | ICD-10-CM

## 2019-01-23 PROCEDURE — 99213 OFFICE O/P EST LOW 20 MIN: CPT | Performed by: FAMILY MEDICINE

## 2019-01-23 RX ORDER — DILTIAZEM HYDROCHLORIDE 180 MG/1
180 CAPSULE, COATED, EXTENDED RELEASE ORAL DAILY
Qty: 30 CAPSULE | Refills: 5 | Status: SHIPPED | OUTPATIENT
Start: 2019-01-23 | End: 2019-04-01

## 2019-01-23 RX ORDER — DOXYCYCLINE HYCLATE 100 MG/1
100 CAPSULE ORAL 2 TIMES DAILY
Qty: 20 CAPSULE | Refills: 0 | Status: SHIPPED | OUTPATIENT
Start: 2019-01-23 | End: 2019-02-02

## 2019-01-23 ASSESSMENT — ENCOUNTER SYMPTOMS
CONSTIPATION: 0
COUGH: 0
SHORTNESS OF BREATH: 0
NAUSEA: 1
RHINORRHEA: 1
DIARRHEA: 0

## 2019-01-24 ENCOUNTER — NURSE ONLY (OUTPATIENT)
Dept: CARDIOLOGY CLINIC | Age: 34
End: 2019-01-24
Payer: COMMERCIAL

## 2019-01-24 DIAGNOSIS — I47.1 SVT (SUPRAVENTRICULAR TACHYCARDIA) (HCC): ICD-10-CM

## 2019-01-24 DIAGNOSIS — I47.1 PSVT (PAROXYSMAL SUPRAVENTRICULAR TACHYCARDIA) (HCC): ICD-10-CM

## 2019-01-24 PROCEDURE — 93000 ELECTROCARDIOGRAM COMPLETE: CPT | Performed by: INTERNAL MEDICINE

## 2019-01-28 ENCOUNTER — TELEPHONE (OUTPATIENT)
Dept: CARDIOLOGY | Age: 34
End: 2019-01-28

## 2019-01-28 ENCOUNTER — TELEPHONE (OUTPATIENT)
Dept: CARDIOLOGY CLINIC | Age: 34
End: 2019-01-28

## 2019-01-31 ENCOUNTER — OFFICE VISIT (OUTPATIENT)
Dept: CARDIOLOGY CLINIC | Age: 34
End: 2019-01-31
Payer: COMMERCIAL

## 2019-01-31 VITALS
HEIGHT: 59 IN | HEART RATE: 126 BPM | SYSTOLIC BLOOD PRESSURE: 110 MMHG | OXYGEN SATURATION: 98 % | DIASTOLIC BLOOD PRESSURE: 80 MMHG | BODY MASS INDEX: 28.83 KG/M2 | WEIGHT: 143 LBS

## 2019-01-31 DIAGNOSIS — I47.1 PSVT (PAROXYSMAL SUPRAVENTRICULAR TACHYCARDIA) (HCC): Primary | ICD-10-CM

## 2019-01-31 PROCEDURE — 93000 ELECTROCARDIOGRAM COMPLETE: CPT | Performed by: NURSE PRACTITIONER

## 2019-01-31 PROCEDURE — 99214 OFFICE O/P EST MOD 30 MIN: CPT | Performed by: NURSE PRACTITIONER

## 2019-02-01 PROCEDURE — 93228 REMOTE 30 DAY ECG REV/REPORT: CPT | Performed by: INTERNAL MEDICINE

## 2019-02-07 DIAGNOSIS — R00.0 TACHYCARDIA: ICD-10-CM

## 2019-03-08 ENCOUNTER — TELEPHONE (OUTPATIENT)
Dept: CARDIOLOGY CLINIC | Age: 34
End: 2019-03-08

## 2019-03-28 ENCOUNTER — TELEPHONE (OUTPATIENT)
Dept: CARDIOLOGY CLINIC | Age: 34
End: 2019-03-28

## 2019-03-28 NOTE — TELEPHONE ENCOUNTER
Spoke with patient. Patient is scheduled with Dr. Neva Cushing for SVT Ablation on 6/13/19 at [facility], arrival time of 6:30am to the Cath Lab. Please have patient arrive to the main entrance of [facility] at 6:15am and check in with the registration desk. Please call patient regarding medication instructions. Remind patient to be NPO after midnight (8 hours prior). Do not apply lotions/creams on skin the day of procedure. Anali Lorenzo MD to [facility]   Julia please schedule . svy ablation with SHIREEN.  She should stop Metoprolol 5 days prior to ablation

## 2019-04-01 ENCOUNTER — TELEPHONE (OUTPATIENT)
Dept: CARDIOLOGY | Age: 34
End: 2019-04-01

## 2019-04-01 ENCOUNTER — OFFICE VISIT (OUTPATIENT)
Dept: FAMILY MEDICINE CLINIC | Age: 34
End: 2019-04-01
Payer: COMMERCIAL

## 2019-04-01 VITALS
OXYGEN SATURATION: 98 % | WEIGHT: 138.2 LBS | HEART RATE: 112 BPM | DIASTOLIC BLOOD PRESSURE: 78 MMHG | HEIGHT: 59 IN | SYSTOLIC BLOOD PRESSURE: 106 MMHG | BODY MASS INDEX: 27.86 KG/M2

## 2019-04-01 DIAGNOSIS — I47.1 PSVT (PAROXYSMAL SUPRAVENTRICULAR TACHYCARDIA) (HCC): Primary | ICD-10-CM

## 2019-04-01 DIAGNOSIS — F43.23 ADJUSTMENT DISORDER WITH MIXED ANXIETY AND DEPRESSED MOOD: ICD-10-CM

## 2019-04-01 PROCEDURE — 99213 OFFICE O/P EST LOW 20 MIN: CPT | Performed by: FAMILY MEDICINE

## 2019-04-01 RX ORDER — BUSPIRONE HYDROCHLORIDE 5 MG/1
5 TABLET ORAL 2 TIMES DAILY
Qty: 60 TABLET | Refills: 0 | Status: SHIPPED | OUTPATIENT
Start: 2019-04-01 | End: 2019-05-01

## 2019-04-01 RX ORDER — ESCITALOPRAM OXALATE 10 MG/1
10 TABLET ORAL DAILY
Qty: 30 TABLET | Refills: 3 | Status: SHIPPED | OUTPATIENT
Start: 2019-04-01 | End: 2019-05-08

## 2019-04-01 ASSESSMENT — ENCOUNTER SYMPTOMS
BLOOD IN STOOL: 0
ABDOMINAL PAIN: 0
COLOR CHANGE: 0
CHEST TIGHTNESS: 0
SHORTNESS OF BREATH: 0
CONSTIPATION: 0
DIARRHEA: 0
COUGH: 0

## 2019-04-01 ASSESSMENT — PATIENT HEALTH QUESTIONNAIRE - PHQ9
SUM OF ALL RESPONSES TO PHQ QUESTIONS 1-9: 2
2. FEELING DOWN, DEPRESSED OR HOPELESS: 1
1. LITTLE INTEREST OR PLEASURE IN DOING THINGS: 1
SUM OF ALL RESPONSES TO PHQ QUESTIONS 1-9: 2
SUM OF ALL RESPONSES TO PHQ9 QUESTIONS 1 & 2: 2

## 2019-04-01 NOTE — PROGRESS NOTES
Chief Complaint   Patient presents with    Tachycardia    Dizziness       HPI:  Fallon Davis is a 36 y.o. (: 1985) here today   for heart rate this am in the 150's and dizziness. She feels like there is a brick sitting on her chest.  She also feels weak. Pt feels that her stress at home is aggravating the heart rate. Pt is scheduled for an ablation in  with . Pt states she is tired but has trouble sleeping. Pt states she has seen someone in the past and was told she had PTSD but patient stopped seeing them. Pt is under increased at stress at home. Feels that stress is aggravating her current issues. HPI    Patient's medications, allergies, past medical, surgical, social and family histories were reviewed and updated asappropriate. ROS:  Review of Systems   Constitutional: Positive for fatigue. Negative for activity change, appetite change and unexpected weight change. HENT: Negative for congestion, ear discharge, ear pain, hearing loss and sneezing. Eyes: Negative for visual disturbance. Respiratory: Negative for cough, chest tightness and shortness of breath. Gastrointestinal: Negative for abdominal pain, blood in stool, constipation and diarrhea. Genitourinary: Negative for difficulty urinating and flank pain. Skin: Negative for color change and rash. Neurological: Positive for dizziness and weakness. Psychiatric/Behavioral: Negative for sleep disturbance. Prior to Visit Medications    Medication Sig Taking?  Authorizing Provider   escitalopram (LEXAPRO) 10 MG tablet Take 1 tablet by mouth daily Yes Christine Sanchez MD   busPIRone (BUSPAR) 5 MG tablet Take 1 tablet by mouth 2 times daily Yes Christine Sanchez MD   metoprolol tartrate (LOPRESSOR) 25 MG tablet Take 1 tablet by mouth 2 times daily  Patient taking differently: Take 50 mg by mouth 2 times daily  Yes Christine Sanchez MD   cetirizine (ZYRTEC) 10 MG tablet Take 10 mg by mouth daily Yes Historical Provider, MD       Allergies   Allergen Reactions    Dilaudid [Hydromorphone Hcl] Other (See Comments)     tachycardia    Amoxicillin Hives    Bactrim [Sulfamethoxazole-Trimethoprim] Swelling    Ceclor [Cefaclor]      Rash      Cephalexin      swelling    Clindamycin/Lincomycin      swelling    Sulfa Antibiotics      swelling    Zithromax [Azithromycin] Other (See Comments)     Stomach pains       OBJECTIVE:    /78   Pulse 112   Ht 4' 11.02\" (1.499 m)   Wt 138 lb 3.2 oz (62.7 kg)   LMP 02/01/2018   SpO2 98%   BMI 27.89 kg/m²     BP Readings from Last 2 Encounters:   04/01/19 106/78   01/31/19 110/80       Wt Readings from Last 3 Encounters:   04/01/19 138 lb 3.2 oz (62.7 kg)   01/31/19 143 lb (64.9 kg)   01/23/19 148 lb 6.4 oz (67.3 kg)       Physical Exam   Constitutional: She is oriented to person, place, and time. She appears well-developed and well-nourished. HENT:   Head: Normocephalic and atraumatic. Eyes: Conjunctivae and EOM are normal.   Neck: No tracheal deviation present. Cardiovascular: Regular rhythm. Tachycardia present. Pulmonary/Chest: Effort normal and breath sounds normal.   Neurological: She is alert and oriented to person, place, and time. Skin: Skin is warm and dry. Psychiatric: Her mood appears anxious. She exhibits a depressed mood. ASSESSMENT/PLAN:     1. PSVT (paroxysmal supraventricular tachycardia) (HCC)  Has taken metoprolol x 3 since last pm.  Will attempt to d/w cardio. Already planned for ablation in approx 2 mo. Likely aggravated by below. 2. Adjustment disorder with mixed anxiety and depressed mood  Try adding buspar and lexapro. ? Hx of ptsd. F/u in 1 mo.             Scribe attestation: Janece Parents, am scribing for and in the presence of Zeus Ferrell MD. Electronically signed by Maxi Tellez on 4/1/2019 at 12:40 PM      Provider attestation: Darian Antonio MD, personally performed the services scribed by the user listed above in my presence, and it is both accurate and complete. I agree with the ROS and Past Histories independently gathered by the clinical support staff and the remaining scribed note accurately describes my personal service to the patient.     [facility]    4/1/2019  12:42 PM

## 2019-04-02 ENCOUNTER — TELEPHONE (OUTPATIENT)
Dept: CARDIOLOGY CLINIC | Age: 34
End: 2019-04-02

## 2019-04-02 RX ORDER — FLECAINIDE ACETATE 50 MG/1
50 TABLET ORAL 2 TIMES DAILY
Qty: 60 TABLET | Refills: 3 | Status: SHIPPED | OUTPATIENT
Start: 2019-04-02 | End: 2019-05-08

## 2019-04-02 NOTE — TELEPHONE ENCOUNTER
Pt called stating she went to [facility] ED yesterday evening (after RPS made notation in pt's chart stating that he spoke with Kristen Rose yesterday is what the pt stated). She stated she was informed to contact the office today to inform  of this and how she needs to proceed. The pt stated she is scheduled for an ablation for June 2019. Please contact pt today at [phone].

## 2019-04-02 NOTE — TELEPHONE ENCOUNTER
I sent a message to start her on Flecainide 50 mg bid. We can move her Electrophysiology study with possible radiofrequency catheter ablation if she wants.      Patient will need to stop all cardiac medications 1 week prior to ablation

## 2019-05-08 ENCOUNTER — OFFICE VISIT (OUTPATIENT)
Dept: FAMILY MEDICINE CLINIC | Age: 34
End: 2019-05-08
Payer: COMMERCIAL

## 2019-05-08 VITALS
HEIGHT: 59 IN | HEART RATE: 76 BPM | OXYGEN SATURATION: 98 % | SYSTOLIC BLOOD PRESSURE: 98 MMHG | WEIGHT: 139 LBS | DIASTOLIC BLOOD PRESSURE: 62 MMHG | BODY MASS INDEX: 28.02 KG/M2

## 2019-05-08 DIAGNOSIS — R35.0 URINARY FREQUENCY: Primary | ICD-10-CM

## 2019-05-08 DIAGNOSIS — F43.23 ADJUSTMENT DISORDER WITH MIXED ANXIETY AND DEPRESSED MOOD: ICD-10-CM

## 2019-05-08 LAB
BILIRUBIN, POC: NEGATIVE
BLOOD URINE, POC: NEGATIVE
CLARITY, POC: CLEAR
COLOR, POC: YELLOW
GLUCOSE URINE, POC: NEGATIVE
KETONES, POC: NEGATIVE
LEUKOCYTE EST, POC: NEGATIVE
NITRITE, POC: NEGATIVE
PH, POC: 6
PROTEIN, POC: NEGATIVE
SPECIFIC GRAVITY, POC: 1.02
UROBILINOGEN, POC: 0.2

## 2019-05-08 PROCEDURE — 81002 URINALYSIS NONAUTO W/O SCOPE: CPT | Performed by: FAMILY MEDICINE

## 2019-05-08 PROCEDURE — 99213 OFFICE O/P EST LOW 20 MIN: CPT | Performed by: FAMILY MEDICINE

## 2019-05-08 RX ORDER — CIPROFLOXACIN 250 MG/1
250 TABLET, FILM COATED ORAL 2 TIMES DAILY
Qty: 14 TABLET | Refills: 0 | Status: SHIPPED | OUTPATIENT
Start: 2019-05-08 | End: 2019-05-15

## 2019-05-08 ASSESSMENT — ENCOUNTER SYMPTOMS
CHEST TIGHTNESS: 0
SHORTNESS OF BREATH: 0
COUGH: 0
BACK PAIN: 1
DIARRHEA: 0
ABDOMINAL PAIN: 0
COLOR CHANGE: 0
CONSTIPATION: 0
BLOOD IN STOOL: 0

## 2019-05-08 NOTE — PROGRESS NOTES
Chief Complaint   Patient presents with    Follow-up     Pt here for follow up after starting and stopping Lexapro and Buspar.  Urinary Tract Infection       HPI:  Charlotte Farley is a 36 y.o. (: 1985) here today   for follow up after starting and stopping Lexapro and Buspar. Pt states she just didn't feel right when taking these meds. She felt like her head was cloudy feeling. Pt states she is feeling better since her heart rate is under control. Urinary Tract Infection    This is a new problem. Episode onset: last week. There has been no fever. Associated symptoms include frequency. Pertinent negatives include no flank pain. Associated symptoms comments: Pressure. Pt denies any blood in the urine. She did have burning when symptoms first started but that has since became better. Patient's medications, allergies, past medical, surgical, social and family histories were reviewed and updated asappropriate. ROS:  Review of Systems   Constitutional: Negative for activity change, appetite change, fatigue and unexpected weight change. HENT: Negative for congestion, ear discharge, ear pain, hearing loss and sneezing. Eyes: Negative for visual disturbance. Respiratory: Negative for cough, chest tightness and shortness of breath. Cardiovascular: Negative for chest pain and palpitations. Gastrointestinal: Negative for abdominal pain, blood in stool, constipation and diarrhea. Genitourinary: Positive for frequency. Negative for difficulty urinating and flank pain. Musculoskeletal: Positive for back pain. Skin: Negative for color change and rash. Neurological: Negative for dizziness. Psychiatric/Behavioral: Negative for sleep disturbance. Prior to Visit Medications    Medication Sig Taking?  Authorizing Provider   ciprofloxacin (CIPRO) 250 MG tablet Take 1 tablet by mouth 2 times daily for 7 days Yes Christine Hong MD   metoprolol tartrate (LOPRESSOR) 25 MG tablet Take 1 tablet by mouth 2 times daily Yes Pawan Patches, MD   cetirizine (ZYRTEC) 10 MG tablet Take 10 mg by mouth daily Yes Historical Provider, MD       Allergies   Allergen Reactions    Dilaudid [Hydromorphone Hcl] Other (See Comments)     tachycardia    Amoxicillin Hives    Bactrim [Sulfamethoxazole-Trimethoprim] Swelling    Ceclor [Cefaclor]      Rash      Cephalexin      swelling    Clindamycin/Lincomycin      swelling    Sulfa Antibiotics      swelling    Zithromax [Azithromycin] Other (See Comments)     Stomach pains       OBJECTIVE:    BP 98/62   Pulse 76   Ht 4' 11.02\" (1.499 m)   Wt 139 lb (63 kg)   LMP 02/01/2018   SpO2 98%   BMI 28.06 kg/m²     BP Readings from Last 2 Encounters:   05/08/19 98/62   04/01/19 106/78       Wt Readings from Last 3 Encounters:   05/08/19 139 lb (63 kg)   04/01/19 138 lb 3.2 oz (62.7 kg)   01/31/19 143 lb (64.9 kg)       Physical Exam   Constitutional: She is oriented to person, place, and time. She appears well-developed and well-nourished. HENT:   Head: Normocephalic and atraumatic. Eyes: Conjunctivae and EOM are normal.   Neck: No tracheal deviation present. Cardiovascular: Normal rate and regular rhythm. Pulmonary/Chest: Effort normal and breath sounds normal.   Abdominal: Soft. There is tenderness (mild) in the suprapubic area. Musculoskeletal: She exhibits no edema. Neurological: She is alert and oriented to person, place, and time. Skin: Skin is warm and dry. Psychiatric: She has a normal mood and affect. ASSESSMENT/PLAN:     1. Urinary frequency  ua neg. Given sxs, abx as listed. Send for cx.    - POCT Urinalysis no Micro  - URINE CULTURE  - ciprofloxacin (CIPRO) 250 MG tablet; Take 1 tablet by mouth 2 times daily for 7 days  Dispense: 14 tablet; Refill: 0    2. Adjustment disorder with mixed anxiety and depressed mood  Did not markos combo of buspar and lexapro. Overall doing better. Ok to stay off med.   If recurrent sxs, consider try lexapro alone. F/u w/ cardio as sched for ablation next mo      Scribe attestation: I, Jean Ingram, am scribing for and in the presence of Ingrid Nguyen MD. Electronically signed by Martha Morrison on 5/8/2019 at 1:52 PM      Provider attestation: Lord Gold MD, personally performed the services scribed by the user listed above in my presence, and it is both accurate and complete. I agree with the ROS and Past Histories independently gathered by the clinical support staff and the remaining scribed note accurately describes my personal service to the patient.     [facility]    5/8/2019  1:53 PM

## 2019-05-10 LAB — URINE CULTURE, ROUTINE: NORMAL

## 2019-05-17 NOTE — TELEPHONE ENCOUNTER
Please review medications for ablation. Patient will need to stop all cardiac medications 1 week prior to procedure (4/2 enc).

## 2019-05-21 NOTE — TELEPHONE ENCOUNTER
The patient has been notified of this information and all questions answered. Patient is Facto V Leiden positive, will she need Lovenox before or after the procedure? How long will she be off work?

## 2019-05-21 NOTE — TELEPHONE ENCOUNTER
No, she had a hysterectomy a few years ago and used Lovenox 5 days before and 5 days after. I informed her that she won't have the same type of physical restrictions.

## 2019-05-21 NOTE — TELEPHONE ENCOUNTER
Start  mg.  I will give her heparin during the procedure and will give her anticoagulation for 3 weeks after the procedure

## 2019-05-21 NOTE — TELEPHONE ENCOUNTER
Left message for pt to return. Pt to hold Metoprolol 5 days prior to procedure, NPO at midnight the night prior, no medications the morning of the procedure.

## 2019-06-13 ENCOUNTER — HOSPITAL ENCOUNTER (OUTPATIENT)
Dept: CARDIAC CATH/INVASIVE PROCEDURES | Age: 34
Discharge: HOME | End: 2019-06-13
Attending: INTERNAL MEDICINE | Admitting: INTERNAL MEDICINE
Payer: COMMERCIAL

## 2019-06-13 VITALS — WEIGHT: 138 LBS | BODY MASS INDEX: 27.09 KG/M2 | HEIGHT: 60 IN

## 2019-06-13 DIAGNOSIS — E78.00 ELEVATED LDL CHOLESTEROL LEVEL: Primary | ICD-10-CM

## 2019-06-13 LAB
ANION GAP SERPL CALCULATED.3IONS-SCNC: 11 MMOL/L (ref 3–16)
BUN BLDV-MCNC: 16 MG/DL (ref 7–20)
CALCIUM SERPL-MCNC: 9.6 MG/DL (ref 8.3–10.6)
CHLORIDE BLD-SCNC: 99 MMOL/L (ref 99–110)
CHOLESTEROL, TOTAL: 246 MG/DL (ref 0–199)
CO2: 27 MMOL/L (ref 21–32)
CREAT SERPL-MCNC: 0.6 MG/DL (ref 0.6–1.1)
EKG ATRIAL RATE: 111 BPM
EKG DIAGNOSIS: NORMAL
EKG P-R INTERVAL: 242 MS
EKG Q-T INTERVAL: 316 MS
EKG QRS DURATION: 82 MS
EKG QTC CALCULATION (BAZETT): 429 MS
EKG R AXIS: 60 DEGREES
EKG T AXIS: -17 DEGREES
EKG VENTRICULAR RATE: 111 BPM
GFR AFRICAN AMERICAN: >60
GFR NON-AFRICAN AMERICAN: >60
GLUCOSE BLD-MCNC: 101 MG/DL (ref 70–99)
HCG QUALITATIVE: NEGATIVE
HCT VFR BLD CALC: 39.9 % (ref 36–48)
HDLC SERPL-MCNC: 52 MG/DL (ref 40–60)
HEMOGLOBIN: 13.6 G/DL (ref 12–16)
INR BLD: 0.99 (ref 0.86–1.14)
LDL CHOLESTEROL CALCULATED: 179 MG/DL
MCH RBC QN AUTO: 30.2 PG (ref 26–34)
MCHC RBC AUTO-ENTMCNC: 34.1 G/DL (ref 31–36)
MCV RBC AUTO: 88.6 FL (ref 80–100)
PDW BLD-RTO: 12.7 % (ref 12.4–15.4)
PLATELET # BLD: 246 K/UL (ref 135–450)
PMV BLD AUTO: 8.5 FL (ref 5–10.5)
POTASSIUM SERPL-SCNC: 3.7 MMOL/L (ref 3.5–5.1)
PROTHROMBIN TIME: 11.3 SEC (ref 9.8–13)
RBC # BLD: 4.5 M/UL (ref 4–5.2)
SODIUM BLD-SCNC: 137 MMOL/L (ref 136–145)
TRIGL SERPL-MCNC: 76 MG/DL (ref 0–150)
VLDLC SERPL CALC-MCNC: 15 MG/DL
WBC # BLD: 7.4 K/UL (ref 4–11)

## 2019-06-13 PROCEDURE — 85027 COMPLETE CBC AUTOMATED: CPT

## 2019-06-13 PROCEDURE — 80048 BASIC METABOLIC PNL TOTAL CA: CPT

## 2019-06-13 PROCEDURE — 2500000003 HC RX 250 WO HCPCS

## 2019-06-13 PROCEDURE — 85610 PROTHROMBIN TIME: CPT

## 2019-06-13 PROCEDURE — 93653 COMPRE EP EVAL TX SVT: CPT | Performed by: INTERNAL MEDICINE

## 2019-06-13 PROCEDURE — 93623 PRGRMD STIMJ&PACG IV RX NFS: CPT

## 2019-06-13 PROCEDURE — 93653 COMPRE EP EVAL TX SVT: CPT

## 2019-06-13 PROCEDURE — 99153 MOD SED SAME PHYS/QHP EA: CPT

## 2019-06-13 PROCEDURE — C1730 CATH, EP, 19 OR FEW ELECT: HCPCS

## 2019-06-13 PROCEDURE — 93010 ELECTROCARDIOGRAM REPORT: CPT | Performed by: INTERNAL MEDICINE

## 2019-06-13 PROCEDURE — 93621 COMP EP EVL L PAC&REC C SINS: CPT | Performed by: INTERNAL MEDICINE

## 2019-06-13 PROCEDURE — 93613 INTRACARDIAC EPHYS 3D MAPG: CPT

## 2019-06-13 PROCEDURE — 93005 ELECTROCARDIOGRAM TRACING: CPT | Performed by: INTERNAL MEDICINE

## 2019-06-13 PROCEDURE — 2709999900 HC NON-CHARGEABLE SUPPLY

## 2019-06-13 PROCEDURE — 93623 PRGRMD STIMJ&PACG IV RX NFS: CPT | Performed by: INTERNAL MEDICINE

## 2019-06-13 PROCEDURE — 6370000000 HC RX 637 (ALT 250 FOR IP)

## 2019-06-13 PROCEDURE — 93613 INTRACARDIAC EPHYS 3D MAPG: CPT | Performed by: INTERNAL MEDICINE

## 2019-06-13 PROCEDURE — 6360000002 HC RX W HCPCS

## 2019-06-13 PROCEDURE — 99152 MOD SED SAME PHYS/QHP 5/>YRS: CPT

## 2019-06-13 PROCEDURE — 93621 COMP EP EVL L PAC&REC C SINS: CPT

## 2019-06-13 PROCEDURE — C1732 CATH, EP, DIAG/ABL, 3D/VECT: HCPCS

## 2019-06-13 PROCEDURE — 80061 LIPID PANEL: CPT

## 2019-06-13 PROCEDURE — 84703 CHORIONIC GONADOTROPIN ASSAY: CPT

## 2019-06-13 PROCEDURE — C1894 INTRO/SHEATH, NON-LASER: HCPCS

## 2019-06-13 PROCEDURE — 2580000003 HC RX 258

## 2019-06-13 RX ORDER — ATORVASTATIN CALCIUM 40 MG/1
40 TABLET, FILM COATED ORAL DAILY
Qty: 30 TABLET | Refills: 3 | Status: SHIPPED | OUTPATIENT
Start: 2019-06-13 | End: 2019-07-18

## 2019-06-13 RX ORDER — FENTANYL CITRATE 50 UG/ML
INJECTION, SOLUTION INTRAMUSCULAR; INTRAVENOUS
Status: COMPLETED | OUTPATIENT
Start: 2019-06-13 | End: 2019-06-13

## 2019-06-13 RX ORDER — MIDAZOLAM HYDROCHLORIDE 5 MG/ML
INJECTION INTRAMUSCULAR; INTRAVENOUS
Status: COMPLETED | OUTPATIENT
Start: 2019-06-13 | End: 2019-06-13

## 2019-06-13 RX ORDER — HEPARIN SODIUM 1000 [USP'U]/ML
INJECTION, SOLUTION INTRAVENOUS; SUBCUTANEOUS
Status: COMPLETED | OUTPATIENT
Start: 2019-06-13 | End: 2019-06-13

## 2019-06-13 RX ORDER — ASPIRIN 325 MG
325 TABLET ORAL DAILY
Qty: 30 TABLET | Refills: 3 | COMMUNITY
Start: 2019-06-13 | End: 2019-06-13

## 2019-06-13 RX ORDER — ASPIRIN 325 MG
325 TABLET ORAL DAILY
Status: DISCONTINUED | OUTPATIENT
Start: 2019-06-13 | End: 2019-06-13 | Stop reason: HOSPADM

## 2019-06-13 RX ORDER — ASPIRIN 325 MG
325 TABLET ORAL DAILY
Qty: 30 TABLET | Refills: 0 | COMMUNITY
Start: 2019-06-13 | End: 2019-07-18

## 2019-06-13 RX ORDER — SODIUM CHLORIDE 9 MG/ML
INJECTION, SOLUTION INTRAVENOUS CONTINUOUS
Status: DISCONTINUED | OUTPATIENT
Start: 2019-06-13 | End: 2019-06-13 | Stop reason: HOSPADM

## 2019-06-13 RX ADMIN — FENTANYL CITRATE 50 MCG: 50 INJECTION, SOLUTION INTRAMUSCULAR; INTRAVENOUS at 11:04

## 2019-06-13 RX ADMIN — FENTANYL CITRATE 50 MCG: 50 INJECTION, SOLUTION INTRAMUSCULAR; INTRAVENOUS at 10:23

## 2019-06-13 RX ADMIN — FENTANYL CITRATE 25 MCG: 50 INJECTION, SOLUTION INTRAMUSCULAR; INTRAVENOUS at 08:22

## 2019-06-13 RX ADMIN — MIDAZOLAM HYDROCHLORIDE 1 MG: 5 INJECTION INTRAMUSCULAR; INTRAVENOUS at 08:12

## 2019-06-13 RX ADMIN — MIDAZOLAM HYDROCHLORIDE 1 MG: 5 INJECTION INTRAMUSCULAR; INTRAVENOUS at 08:02

## 2019-06-13 RX ADMIN — FENTANYL CITRATE 25 MCG: 50 INJECTION, SOLUTION INTRAMUSCULAR; INTRAVENOUS at 08:02

## 2019-06-13 RX ADMIN — MIDAZOLAM HYDROCHLORIDE 1 MG: 5 INJECTION INTRAMUSCULAR; INTRAVENOUS at 08:22

## 2019-06-13 RX ADMIN — HEPARIN SODIUM 3000 UNITS: 1000 INJECTION, SOLUTION INTRAVENOUS; SUBCUTANEOUS at 09:19

## 2019-06-13 RX ADMIN — Medication 325 MG: at 13:30

## 2019-06-13 RX ADMIN — MIDAZOLAM HYDROCHLORIDE 2 MG: 5 INJECTION INTRAMUSCULAR; INTRAVENOUS at 10:25

## 2019-06-13 NOTE — PROCEDURES
[address]                            CARDIAC CATHETERIZATION    PATIENT NAME: Ramesh Delarosa                  :        1985  MED REC NO:   [id number]                          ROOM:  ACCOUNT NO:   [de-identified]                           ADMIT DATE: 2019  PROVIDER:     Reece Carrillo MD    DATE OF PROCEDURE:  2019    PROCEDURE PERFORMED:  1. Complex electrophysiology study with attempted induction of  arrhythmia. 2.  Drug challenge with IV isoproterenol. 3.  Left atrial pacing, mapping, and recording using a coronary sinus  catheter. 4.  Three-dimensional map using the CARTO mapping system. 5.  Radiofrequency catheter ablation of atrial tachycardia coming from  lateral between the anterior tricuspid annulus and low terry rendering  it noninducible. Pre and post Twan score of 10. Pre Mallampati score of 1. MEDICATIONS:  Given Versed 6 mg, next fentanyl 200 mcg. PROCEDURE START TIME:  8 a.m. PROCEDURE STOP TIME:  11:15 a.m. INDICATION:  Drug refractory symptomatic atrial arrhythmia. PROCEDURE IN DETAIL:  The patient was brought to the EP lab in fasting,  nonsedated state. Procedure being performed and the patient was  identified. Risks, benefits, and alternative rationale of the procedure  were discussed with the patient, which included, but were not limited to  pain, bleeding, infection, pneumothorax, hemothorax, cardiac perfusion,  tamponade, stroke, myocardial infarction, need for emergent surgery,  permanent pacemaker, and death. She demonstrated complete understanding  and wanted to proceed. After obtaining an informed consent, the patient was prepped and draped  in the usual sterile fashion. IV conscious sedation was given using  fentanyl, Versed, and a continuous blood pressure, pulse oximetry, and  pulse monitoring.   After local infiltration with lidocaine, three venous  sheaths were placed into the right femoral vein. A decapolar catheter  from [employer] was advanced into the coronary sinus. The catheter kept on  coming out, so the decapolar catheter from [employer] was exchanged for an  Inquiry catheter from [address] [place] [address] and then a high RA and a His bundle  location catheter was placed. Baseline sinus cycle length was 654 milliseconds. The AH interval 90  milliseconds and HV interval was 54 milliseconds. Next, decremental  pacing was done from the high RA and proximal coronary sinus. AV block  was 380 and the patient run into a narrow complex long RP tachycardia  with a cycle length of 520 milliseconds with high RA being the earliest.  Burst pacing was done to break the tachycardia and confirm it as an  atrial tachycardia. Next, there was no VA conduction at baseline. Program stimulation was done from proximal coronary sinus in the high  RA.  AV layo ERP was 600 320 all the way to S4. VERP was 600 less than  220. The patient was started on IV isoproterenol. On isoproterenol, the  sinus cycle length was of 520 milliseconds. AV block was 240  milliseconds. The AV layo ERP was 450 less than 220 S4 and VERP was  400 less than 220. The patient continued to go into a narrow complex  long RP tachycardia. At this point, I decided to create a  three-dimensional activation map. Initially, J curve ThermoCool  catheter from [employer] was used to map the tachycardia and then a PentaRay  catheter was advanced into the right atrium. The tachycardia was  earliest between the low terry terminalis and the lateral border of the  tricuspid valve, the tricuspid annulus. At this point, radiofrequency  catheter ablation was done between the tricuspid annulus and the lateral  terry. The earliest activation was targeted. During ablation the  patient converted to sinus rhythm.   Ablation was completed in that area  and post ablation study was done with and without

## 2019-06-18 ENCOUNTER — OFFICE VISIT (OUTPATIENT)
Dept: FAMILY MEDICINE CLINIC | Age: 34
End: 2019-06-18
Payer: COMMERCIAL

## 2019-06-18 VITALS
SYSTOLIC BLOOD PRESSURE: 98 MMHG | DIASTOLIC BLOOD PRESSURE: 68 MMHG | OXYGEN SATURATION: 99 % | HEIGHT: 59 IN | WEIGHT: 141.4 LBS | BODY MASS INDEX: 28.51 KG/M2 | HEART RATE: 73 BPM

## 2019-06-18 DIAGNOSIS — G89.18 ACUTE POSTOPERATIVE PAIN OF RIGHT GROIN: ICD-10-CM

## 2019-06-18 DIAGNOSIS — D68.51 FACTOR V LEIDEN MUTATION (HCC): ICD-10-CM

## 2019-06-18 DIAGNOSIS — M25.551 PAIN IN RIGHT HIP: Primary | ICD-10-CM

## 2019-06-18 DIAGNOSIS — R10.31 ACUTE POSTOPERATIVE PAIN OF RIGHT GROIN: ICD-10-CM

## 2019-06-18 PROCEDURE — 99213 OFFICE O/P EST LOW 20 MIN: CPT | Performed by: FAMILY MEDICINE

## 2019-06-18 RX ORDER — HYDROCODONE BITARTRATE AND ACETAMINOPHEN 5; 325 MG/1; MG/1
1 TABLET ORAL EVERY 4 HOURS PRN
Qty: 5 TABLET | Refills: 0 | Status: SHIPPED | OUTPATIENT
Start: 2019-06-18 | End: 2019-06-21

## 2019-06-18 RX ORDER — METOPROLOL SUCCINATE 25 MG/1
25 TABLET, EXTENDED RELEASE ORAL DAILY
COMMUNITY
End: 2019-07-18

## 2019-06-18 ASSESSMENT — ENCOUNTER SYMPTOMS
BLOOD IN STOOL: 0
SHORTNESS OF BREATH: 0
DIARRHEA: 0
CHEST TIGHTNESS: 0
CONSTIPATION: 0

## 2019-06-18 NOTE — PROGRESS NOTES
Chief Complaint   Patient presents with    Leg Pain     Right       HPI:  Ira Worthington is a 36 y.o. (: 1985) here today   for   HPI  Had cardiac ablation Friday, unsure if related. Pain radiating between knee and hip. Started yesterday, constant. 7 out of 10 pain. No numbness or tingling. Was not having any issues prior to ablation. No injury. Pain is on the same side as ablation. Stated it feels like the circulation is cut off to right leg. Patient's medications, allergies, past medical, surgical, social and family histories were reviewed and updated as appropriate. ROS:  Review of Systems   Constitutional: Negative for chills, fatigue and fever. Respiratory: Negative for chest tightness and shortness of breath. Cardiovascular: Positive for palpitations. Negative for chest pain. Gastrointestinal: Negative for blood in stool, constipation and diarrhea. Neurological: Negative for dizziness, light-headedness and headaches. Prior to Visit Medications    Medication Sig Taking? Authorizing Provider   metoprolol succinate (TOPROL XL) 25 MG extended release tablet Take 25 mg by mouth daily Yes Historical Provider, MD   HYDROcodone-acetaminophen (NORCO) 5-325 MG per tablet Take 1 tablet by mouth every 4 hours as needed for Pain for up to 3 days. Intended supply: 3 days.  Take lowest dose possible to manage pain Yes Kelsey Goff MD   aspirin 325 MG tablet Take 1 tablet by mouth daily Yes Ric Fabry, APRN - CNP   atorvastatin (LIPITOR) 40 MG tablet Take 1 tablet by mouth daily Yes Kelsey Goff MD   cetirizine (ZYRTEC) 10 MG tablet Take 10 mg by mouth daily Yes Historical Provider, MD       Allergies   Allergen Reactions    Dilaudid [Hydromorphone Hcl] Other (See Comments)     tachycardia    Amoxicillin Hives    Bactrim [Sulfamethoxazole-Trimethoprim] Swelling    Ceclor [Cefaclor]      Rash      Cephalexin      swelling    Clindamycin/Lincomycin      swelling    Phenergan [Promethazine Hcl]      Increased heart rate    Sulfa Antibiotics      swelling    Zithromax [Azithromycin] Other (See Comments)     Stomach pains       OBJECTIVE:    BP 98/68   Pulse 73   Ht 4' 11.02\" (1.499 m)   Wt 141 lb 6.4 oz (64.1 kg)   LMP 02/01/2018   SpO2 99%   BMI 28.54 kg/m²     BP Readings from Last 2 Encounters:   06/18/19 98/68   05/08/19 98/62       Wt Readings from Last 3 Encounters:   06/18/19 141 lb 6.4 oz (64.1 kg)   06/13/19 138 lb (62.6 kg)   05/08/19 139 lb (63 kg)        Physical Exam   Constitutional: She is oriented to person, place, and time. She appears well-developed and well-nourished. HENT:   Head: Normocephalic and atraumatic. Eyes: Conjunctivae and EOM are normal.   Neck: No tracheal deviation present. Cardiovascular: Normal rate and regular rhythm. Pulmonary/Chest: Effort normal and breath sounds normal.   Abdominal:       Musculoskeletal: She exhibits no edema. Neurological: She is alert and oriented to person, place, and time. Skin: Skin is warm and dry. Psychiatric: She has a normal mood and affect. ASSESSMENT/PLAN:    1. Pain in right hip  Wonder about hematoma causing pain. Discussed warm compresses. Has markos med as below in the past.  Plan to arrange u/s in am.  May need to consider venous u/s given hx of clot as well  - HYDROcodone-acetaminophen (NORCO) 5-325 MG per tablet; Take 1 tablet by mouth every 4 hours as needed for Pain for up to 3 days. Intended supply: 3 days. Take lowest dose possible to manage pain  Dispense: 5 tablet; Refill: 0    2. Acute postoperative pain of right groin  See above. ER if inc swelling or pain overnight. Attempt to arrange u/s for tomorrow. - Ultrasound doppler arterial leg right; Future  - US DUP LOWER EXTREMITY RIGHT FANNY; Future    3. [facility]  See above.            Scribe attestation:Samir BHANDARI, am scribing for and in the presence of Kelsey Goff MD. Electronically signed by Bessie DELEON, on 6/18/2019 at 5:12 PM      Provider attestation: Ofelia Roca MD, personally performed the services scribed by the user listed above in my presence, and it is both accurate and complete. I agree with the ROS and Past Histories independently gathered by the clinical support staff and the remaining scribed note accurately describes my personal service to the patient.     [facility]    6/18/2019  5:13 PM

## 2019-06-19 ENCOUNTER — TELEPHONE (OUTPATIENT)
Dept: FAMILY MEDICINE CLINIC | Age: 34
End: 2019-06-19

## 2019-06-19 ENCOUNTER — OFFICE VISIT (OUTPATIENT)
Dept: CARDIOLOGY CLINIC | Age: 34
End: 2019-06-19

## 2019-06-19 ENCOUNTER — TELEPHONE (OUTPATIENT)
Dept: CARDIOLOGY CLINIC | Age: 34
End: 2019-06-19

## 2019-06-19 VITALS
BODY MASS INDEX: 27.09 KG/M2 | OXYGEN SATURATION: 98 % | WEIGHT: 138 LBS | DIASTOLIC BLOOD PRESSURE: 60 MMHG | HEIGHT: 60 IN | HEART RATE: 78 BPM | SYSTOLIC BLOOD PRESSURE: 92 MMHG

## 2019-06-19 DIAGNOSIS — G89.18 POST-OP PAIN: Primary | ICD-10-CM

## 2019-06-19 PROCEDURE — 99024 POSTOP FOLLOW-UP VISIT: CPT | Performed by: INTERNAL MEDICINE

## 2019-06-19 NOTE — PROGRESS NOTES
Pt's groin site was examined by Dr. Dony Chávez. No bruit was heard. Site is clean dry and intact. No obvious signs of inflammation or infection. Pt was advised to take Tylenol 650 mg PRN for pain and use heat. She was also advised to call our office on Friday to report how her symptoms are progression, and to not return to work until re-evaluated. She verbalized understanding of all.

## 2019-06-19 NOTE — TELEPHONE ENCOUNTER
Patient having pain and swelling in her groin and into her hip. Is addiment about not wanting to go to the ed, states she cannot afford this. I spoke with Dr. Marlene Beatty and he wants her to come in the office to be evaluated. She will be here at the [facility] within the next hour and half.

## 2019-06-21 ENCOUNTER — TELEPHONE (OUTPATIENT)
Dept: CARDIOLOGY CLINIC | Age: 34
End: 2019-06-21

## 2019-06-21 NOTE — TELEPHONE ENCOUNTER
Pt called to give us an update on how she is feeling. Pt said that her upper right leg feels tight and it is sore/hurts to move. The pain in the right leg radiates up into the right hip. However, the limping is not as bad as it used to be. Pt denies any swelling and says the bruising has gotten a little better. Pt wants to know when she can go back to work? Pt can be reached at [phone].       TY

## 2019-06-24 ENCOUNTER — NURSE ONLY (OUTPATIENT)
Dept: CARDIOLOGY CLINIC | Age: 34
End: 2019-06-24

## 2019-06-24 DIAGNOSIS — Z51.89 VISIT FOR WOUND CHECK: Primary | ICD-10-CM

## 2019-06-24 NOTE — PROGRESS NOTES
Patient presented today for groin check. Patient's right groin site soft, slightly bruised, no bleeding. She states that at times the site feels \"stiff\", but otherwise no complaints. Requesting to go back to work. Per discussion with Dr. Giselle Smith on 6/21/19, patient okay to return to work without restrictions if groin site was okay. Work release printed.

## 2019-07-11 PROBLEM — I47.19 ATRIAL TACHYCARDIA: Status: ACTIVE | Noted: 2019-07-11

## 2019-07-11 PROBLEM — I47.1 ATRIAL TACHYCARDIA (HCC): Status: ACTIVE | Noted: 2019-07-11

## 2019-07-18 ENCOUNTER — OFFICE VISIT (OUTPATIENT)
Dept: CARDIOLOGY CLINIC | Age: 34
End: 2019-07-18
Payer: COMMERCIAL

## 2019-07-18 VITALS
SYSTOLIC BLOOD PRESSURE: 122 MMHG | HEIGHT: 60 IN | BODY MASS INDEX: 27.29 KG/M2 | HEART RATE: 72 BPM | DIASTOLIC BLOOD PRESSURE: 72 MMHG | WEIGHT: 139 LBS

## 2019-07-18 DIAGNOSIS — I47.1 ATRIAL TACHYCARDIA (HCC): Primary | ICD-10-CM

## 2019-07-18 DIAGNOSIS — R07.2 PRECORDIAL PAIN: ICD-10-CM

## 2019-07-18 PROCEDURE — 99213 OFFICE O/P EST LOW 20 MIN: CPT | Performed by: NURSE PRACTITIONER

## 2019-07-18 PROCEDURE — 93000 ELECTROCARDIOGRAM COMPLETE: CPT | Performed by: NURSE PRACTITIONER

## 2019-07-23 ENCOUNTER — HOSPITAL ENCOUNTER (OUTPATIENT)
Dept: VASCULAR LAB | Age: 34
Discharge: HOME | End: 2019-07-23
Payer: COMMERCIAL

## 2019-07-23 DIAGNOSIS — R10.31 ACUTE POSTOPERATIVE PAIN OF RIGHT GROIN: ICD-10-CM

## 2019-07-23 DIAGNOSIS — G89.18 ACUTE POSTOPERATIVE PAIN OF RIGHT GROIN: ICD-10-CM

## 2019-07-23 PROCEDURE — 93971 EXTREMITY STUDY: CPT

## 2019-07-24 ENCOUNTER — OFFICE VISIT (OUTPATIENT)
Dept: FAMILY MEDICINE CLINIC | Age: 34
End: 2019-07-24
Payer: COMMERCIAL

## 2019-07-24 VITALS
OXYGEN SATURATION: 98 % | SYSTOLIC BLOOD PRESSURE: 102 MMHG | BODY MASS INDEX: 27.86 KG/M2 | HEIGHT: 59 IN | DIASTOLIC BLOOD PRESSURE: 72 MMHG | WEIGHT: 138.2 LBS | HEART RATE: 88 BPM

## 2019-07-24 DIAGNOSIS — M79.604 RIGHT LEG PAIN: Primary | ICD-10-CM

## 2019-07-24 PROCEDURE — G8427 DOCREV CUR MEDS BY ELIG CLIN: HCPCS | Performed by: FAMILY MEDICINE

## 2019-07-24 PROCEDURE — 99213 OFFICE O/P EST LOW 20 MIN: CPT | Performed by: FAMILY MEDICINE

## 2019-07-24 PROCEDURE — G8419 CALC BMI OUT NRM PARAM NOF/U: HCPCS | Performed by: FAMILY MEDICINE

## 2019-07-24 PROCEDURE — 1036F TOBACCO NON-USER: CPT | Performed by: FAMILY MEDICINE

## 2019-07-24 ASSESSMENT — ENCOUNTER SYMPTOMS
SHORTNESS OF BREATH: 0
DIARRHEA: 0
CHEST TIGHTNESS: 0
ABDOMINAL PAIN: 0
COLOR CHANGE: 0
BLOOD IN STOOL: 0
CONSTIPATION: 0
COUGH: 0

## 2019-08-26 ENCOUNTER — OFFICE VISIT (OUTPATIENT)
Dept: FAMILY MEDICINE CLINIC | Age: 34
End: 2019-08-26
Payer: COMMERCIAL

## 2019-08-26 VITALS
OXYGEN SATURATION: 98 % | WEIGHT: 135 LBS | BODY MASS INDEX: 26.5 KG/M2 | HEIGHT: 60 IN | RESPIRATION RATE: 16 BRPM | HEART RATE: 96 BPM | TEMPERATURE: 97.4 F | SYSTOLIC BLOOD PRESSURE: 104 MMHG | DIASTOLIC BLOOD PRESSURE: 78 MMHG

## 2019-08-26 DIAGNOSIS — M54.6 ACUTE RIGHT-SIDED THORACIC BACK PAIN: Primary | ICD-10-CM

## 2019-08-26 DIAGNOSIS — M62.838 MUSCLE SPASM: ICD-10-CM

## 2019-08-26 PROCEDURE — G8427 DOCREV CUR MEDS BY ELIG CLIN: HCPCS | Performed by: PHYSICIAN ASSISTANT

## 2019-08-26 PROCEDURE — 1036F TOBACCO NON-USER: CPT | Performed by: PHYSICIAN ASSISTANT

## 2019-08-26 PROCEDURE — G8419 CALC BMI OUT NRM PARAM NOF/U: HCPCS | Performed by: PHYSICIAN ASSISTANT

## 2019-08-26 PROCEDURE — 99213 OFFICE O/P EST LOW 20 MIN: CPT | Performed by: PHYSICIAN ASSISTANT
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
## 2019-10-01 ENCOUNTER — HOSPITAL ENCOUNTER (OUTPATIENT)
Age: 34
Discharge: HOME | End: 2019-10-01
Payer: COMMERCIAL

## 2019-10-01 ENCOUNTER — HOSPITAL ENCOUNTER (OUTPATIENT)
Dept: GENERAL RADIOLOGY | Age: 34
Discharge: HOME | End: 2019-10-01
Payer: COMMERCIAL

## 2019-10-01 DIAGNOSIS — M25.572 ACUTE LEFT ANKLE PAIN: ICD-10-CM

## 2019-10-01 DIAGNOSIS — M25.572 ACUTE LEFT ANKLE PAIN: Primary | ICD-10-CM

## 2019-10-01 PROCEDURE — 73610 X-RAY EXAM OF ANKLE: CPT

## 2019-10-16 ENCOUNTER — NURSE TRIAGE (OUTPATIENT)
Dept: OTHER | Facility: CLINIC | Age: 34
End: 2019-10-16

## 2019-10-17 ENCOUNTER — OFFICE VISIT (OUTPATIENT)
Dept: FAMILY MEDICINE CLINIC | Age: 34
End: 2019-10-17
Payer: COMMERCIAL

## 2019-10-17 VITALS
SYSTOLIC BLOOD PRESSURE: 124 MMHG | WEIGHT: 149 LBS | HEART RATE: 81 BPM | OXYGEN SATURATION: 99 % | HEIGHT: 60 IN | DIASTOLIC BLOOD PRESSURE: 76 MMHG | BODY MASS INDEX: 29.25 KG/M2

## 2019-10-17 DIAGNOSIS — R10.33 PERIUMBILICAL ABDOMINAL PAIN: Primary | ICD-10-CM

## 2019-10-17 DIAGNOSIS — R11.2 NAUSEA AND VOMITING, INTRACTABILITY OF VOMITING NOT SPECIFIED, UNSPECIFIED VOMITING TYPE: ICD-10-CM

## 2019-10-17 PROCEDURE — 1036F TOBACCO NON-USER: CPT | Performed by: FAMILY MEDICINE

## 2019-10-17 PROCEDURE — G8484 FLU IMMUNIZE NO ADMIN: HCPCS | Performed by: FAMILY MEDICINE

## 2019-10-17 PROCEDURE — G8427 DOCREV CUR MEDS BY ELIG CLIN: HCPCS | Performed by: FAMILY MEDICINE

## 2019-10-17 PROCEDURE — 99214 OFFICE O/P EST MOD 30 MIN: CPT | Performed by: FAMILY MEDICINE

## 2019-10-17 PROCEDURE — G8419 CALC BMI OUT NRM PARAM NOF/U: HCPCS | Performed by: FAMILY MEDICINE

## 2019-10-17 RX ORDER — CIPROFLOXACIN 500 MG/1
500 TABLET, FILM COATED ORAL 2 TIMES DAILY
Qty: 14 TABLET | Refills: 0 | Status: SHIPPED | OUTPATIENT
Start: 2019-10-17 | End: 2019-10-25 | Stop reason: SDUPTHER

## 2019-10-17 RX ORDER — DICYCLOMINE HYDROCHLORIDE 10 MG/1
10 CAPSULE ORAL 4 TIMES DAILY
Qty: 45 CAPSULE | Refills: 0 | Status: SHIPPED | OUTPATIENT
Start: 2019-10-17 | End: 2019-10-28

## 2019-10-17 RX ORDER — ONDANSETRON 4 MG/1
4 TABLET, FILM COATED ORAL DAILY PRN
Qty: 20 TABLET | Refills: 0 | Status: SHIPPED | OUTPATIENT
Start: 2019-10-17 | End: 2019-10-25 | Stop reason: SDUPTHER

## 2019-10-17 ASSESSMENT — ENCOUNTER SYMPTOMS
DIARRHEA: 1
BELCHING: 0
NAUSEA: 1
SHORTNESS OF BREATH: 0
VOMITING: 1
CHEST TIGHTNESS: 0
ABDOMINAL PAIN: 1
BLOOD IN STOOL: 0
CONSTIPATION: 0

## 2019-10-21 ENCOUNTER — TELEPHONE (OUTPATIENT)
Dept: FAMILY MEDICINE CLINIC | Age: 34
End: 2019-10-21

## 2019-10-21 DIAGNOSIS — R10.9 ABDOMINAL PAIN, UNSPECIFIED ABDOMINAL LOCATION: ICD-10-CM

## 2019-10-21 DIAGNOSIS — R10.33 PERIUMBILICAL ABDOMINAL PAIN: Primary | ICD-10-CM

## 2019-10-23 ENCOUNTER — NURSE ONLY (OUTPATIENT)
Dept: FAMILY MEDICINE CLINIC | Age: 34
End: 2019-10-23
Payer: COMMERCIAL

## 2019-10-23 DIAGNOSIS — R10.9 ABDOMINAL PAIN, UNSPECIFIED ABDOMINAL LOCATION: Primary | ICD-10-CM

## 2019-10-23 LAB
A/G RATIO: 1 (ref 1–2.5)
ALBUMIN SERPL-MCNC: 4.1 G/DL (ref 3.6–5)
ALP BLD-CCNC: 76 U/L (ref 46–116)
ALT SERPL-CCNC: 17 U/L (ref 12–78)
ANION GAP SERPL CALCULATED.3IONS-SCNC: 6.6 MMOL/L (ref 8–16)
AST SERPL-CCNC: 15 U/L (ref 12–36)
BASOPHILS RELATIVE PERCENT: 0.8 % (ref 0–1)
BILIRUB SERPL-MCNC: 0.6 MG/DL (ref 0–1.1)
BILIRUBIN, POC: NEGATIVE
BLOOD URINE, POC: NEGATIVE
BUN BLDV-MCNC: 9 MG/DL (ref 5–19)
CALCIUM SERPL-MCNC: 9.4 MG/DL (ref 8.4–10.2)
CHLORIDE BLD-SCNC: 100 MMOL/L (ref 99–111)
CLARITY, POC: CLEAR
CO2: 31 MMOL/L (ref 21–33)
COLOR, POC: YELLOW
CREAT SERPL-MCNC: 0.7 MG/DL (ref 0.6–1.3)
EOSINOPHILS RELATIVE PERCENT: 2.5 % (ref 0–5)
ERYTHROCYTE DISTRIBUTION WIDTH RBC RATIO: 12.1 % (ref 11.6–14.8)
GFR CALCULATED: 96
GLOBULIN: 4.1 G/DL (ref 2–3.5)
GLUCOSE BLD-MCNC: 91 MG/DL (ref 74–99)
GLUCOSE URINE, POC: NEGATIVE
GRANULOCYTE ABSOLUTE COUNT: 5.7 X(10)3/UL (ref 1.8–7.2)
HCT VFR BLD CALC: 39.5 % (ref 35.7–46.7)
HEMOGLOBIN: 13.6 G/DL (ref 11.9–15.9)
IMMATURE GRANULOCYTES %: 0.3 % (ref 0–0.5)
KETONES, POC: NEGATIVE
LEUKOCYTE EST, POC: NEGATIVE
LIPASE: 100 Ï¿½IUÏ¿½/L (ref 40–300)
LYMPHOCYTES ABSOLUTE: 1.3 X(10)3/UL (ref 1.1–2.7)
LYMPHOCYTES RELATIVE PERCENT: 16.6 % (ref 15–45)
MCH RBC QN AUTO: 30.2 PG (ref 28.1–33.6)
MCHC RBC AUTO-ENTMCNC: 34.4 G/DL (ref 32.8–34.7)
MCV RBC AUTO: 87.6 FL (ref 82.9–99.9)
MONOCYTES RELATIVE PERCENT: 6.7 % (ref 0–12)
NEUTROPHILS/100 LEUKOCYTES: 73.1 % (ref 40–70)
NITRITE, POC: NEGATIVE
PH, POC: 7
PLATELETS: 245 X1000 (ref 175–420)
POTASSIUM SERPL-SCNC: 3.5 MMOL/L (ref 3.4–5)
PROTEIN, POC: NEGATIVE
RBC # BLD: 4.51 X1000000 (ref 3.72–5.31)
SODIUM BLD-SCNC: 134 MMOL/L (ref 136–146)
SPECIFIC GRAVITY, POC: 1.01
TOTAL PROTEIN: 8.2 G/DL (ref 6.3–8)
UROBILINOGEN, POC: 0.2
WBC # BLD: 7.7 X1000 (ref 4.5–10.3)

## 2019-10-23 PROCEDURE — 81002 URINALYSIS NONAUTO W/O SCOPE: CPT | Performed by: FAMILY MEDICINE

## 2019-10-24 ENCOUNTER — TELEPHONE (OUTPATIENT)
Dept: FAMILY MEDICINE CLINIC | Age: 34
End: 2019-10-24

## 2019-10-24 DIAGNOSIS — R11.2 NAUSEA AND VOMITING, INTRACTABILITY OF VOMITING NOT SPECIFIED, UNSPECIFIED VOMITING TYPE: ICD-10-CM

## 2019-10-24 DIAGNOSIS — R10.11 RIGHT UPPER QUADRANT ABDOMINAL PAIN: Primary | ICD-10-CM

## 2019-10-25 RX ORDER — SUCRALFATE 1 G/1
1 TABLET ORAL 4 TIMES DAILY
Qty: 145 TABLET | Refills: 0 | Status: SHIPPED | OUTPATIENT
Start: 2019-10-25 | End: 2019-11-14

## 2019-10-25 RX ORDER — CIPROFLOXACIN 500 MG/1
500 TABLET, FILM COATED ORAL 2 TIMES DAILY
Qty: 14 TABLET | Refills: 0 | Status: SHIPPED | OUTPATIENT
Start: 2019-10-25 | End: 2019-11-01

## 2019-10-25 RX ORDER — ONDANSETRON 4 MG/1
4 TABLET, FILM COATED ORAL DAILY PRN
Qty: 20 TABLET | Refills: 0 | Status: SHIPPED | OUTPATIENT
Start: 2019-10-25 | End: 2020-01-29

## 2019-10-28 ENCOUNTER — INITIAL CONSULT (OUTPATIENT)
Dept: GASTROENTEROLOGY | Age: 34
End: 2019-10-28
Payer: COMMERCIAL

## 2019-10-28 VITALS
BODY MASS INDEX: 29.06 KG/M2 | DIASTOLIC BLOOD PRESSURE: 74 MMHG | HEIGHT: 60 IN | SYSTOLIC BLOOD PRESSURE: 122 MMHG | WEIGHT: 148 LBS

## 2019-10-28 DIAGNOSIS — R10.11 RIGHT UPPER QUADRANT ABDOMINAL PAIN: ICD-10-CM

## 2019-10-28 DIAGNOSIS — R10.84 GENERALIZED ABDOMINAL PAIN: Primary | ICD-10-CM

## 2019-10-28 DIAGNOSIS — R11.2 NON-INTRACTABLE VOMITING WITH NAUSEA, UNSPECIFIED VOMITING TYPE: ICD-10-CM

## 2019-10-28 PROBLEM — R11.10 NON-INTRACTABLE VOMITING: Status: ACTIVE | Noted: 2019-10-28

## 2019-10-28 PROCEDURE — G8484 FLU IMMUNIZE NO ADMIN: HCPCS | Performed by: INTERNAL MEDICINE

## 2019-10-28 PROCEDURE — G8419 CALC BMI OUT NRM PARAM NOF/U: HCPCS | Performed by: INTERNAL MEDICINE

## 2019-10-28 PROCEDURE — 99204 OFFICE O/P NEW MOD 45 MIN: CPT | Performed by: INTERNAL MEDICINE

## 2019-10-28 PROCEDURE — 1036F TOBACCO NON-USER: CPT | Performed by: INTERNAL MEDICINE

## 2019-10-28 PROCEDURE — G8427 DOCREV CUR MEDS BY ELIG CLIN: HCPCS | Performed by: INTERNAL MEDICINE

## 2019-10-28 RX ORDER — TRAMADOL HYDROCHLORIDE 50 MG/1
50 TABLET ORAL EVERY 8 HOURS PRN
Qty: 15 TABLET | Refills: 0 | Status: SHIPPED | OUTPATIENT
Start: 2019-10-28 | End: 2019-11-02

## 2019-10-28 RX ORDER — OMEPRAZOLE 20 MG/1
20 CAPSULE, DELAYED RELEASE ORAL
Qty: 60 CAPSULE | Refills: 1 | Status: SHIPPED | OUTPATIENT
Start: 2019-10-28 | End: 2019-11-14

## 2019-11-16 ENCOUNTER — HOSPITAL ENCOUNTER (OUTPATIENT)
Dept: ULTRASOUND IMAGING | Age: 34
Discharge: HOME | End: 2019-11-16
Payer: COMMERCIAL

## 2019-11-16 DIAGNOSIS — R10.11 RIGHT UPPER QUADRANT ABDOMINAL PAIN: ICD-10-CM

## 2019-11-16 DIAGNOSIS — R11.2 NON-INTRACTABLE VOMITING WITH NAUSEA, UNSPECIFIED VOMITING TYPE: ICD-10-CM

## 2019-11-16 PROCEDURE — 76705 ECHO EXAM OF ABDOMEN: CPT

## 2019-11-18 ENCOUNTER — TELEPHONE (OUTPATIENT)
Dept: GASTROENTEROLOGY | Age: 34
End: 2019-11-18

## 2019-11-27 ENCOUNTER — OFFICE VISIT (OUTPATIENT)
Dept: FAMILY MEDICINE CLINIC | Age: 34
End: 2019-11-27
Payer: COMMERCIAL

## 2019-11-27 VITALS
OXYGEN SATURATION: 99 % | HEIGHT: 60 IN | DIASTOLIC BLOOD PRESSURE: 78 MMHG | HEART RATE: 91 BPM | WEIGHT: 146.4 LBS | SYSTOLIC BLOOD PRESSURE: 120 MMHG | BODY MASS INDEX: 28.74 KG/M2

## 2019-11-27 DIAGNOSIS — M54.6 THORACIC SPINE PAIN: Primary | ICD-10-CM

## 2019-11-27 DIAGNOSIS — R10.9 RIGHT FLANK PAIN: ICD-10-CM

## 2019-11-27 PROCEDURE — G8484 FLU IMMUNIZE NO ADMIN: HCPCS | Performed by: FAMILY MEDICINE

## 2019-11-27 PROCEDURE — 99213 OFFICE O/P EST LOW 20 MIN: CPT | Performed by: FAMILY MEDICINE

## 2019-11-27 PROCEDURE — G8427 DOCREV CUR MEDS BY ELIG CLIN: HCPCS | Performed by: FAMILY MEDICINE

## 2019-11-27 PROCEDURE — 1036F TOBACCO NON-USER: CPT | Performed by: FAMILY MEDICINE

## 2019-11-27 PROCEDURE — G8419 CALC BMI OUT NRM PARAM NOF/U: HCPCS | Performed by: FAMILY MEDICINE

## 2019-11-27 RX ORDER — TIZANIDINE 4 MG/1
4 TABLET ORAL 3 TIMES DAILY
Qty: 30 TABLET | Refills: 0 | Status: SHIPPED | OUTPATIENT
Start: 2019-11-27 | End: 2020-01-29 | Stop reason: ALTCHOICE

## 2019-11-27 RX ORDER — METHYLPREDNISOLONE 4 MG/1
TABLET ORAL
Qty: 1 KIT | Refills: 0 | Status: SHIPPED | OUTPATIENT
Start: 2019-11-27 | End: 2020-01-29

## 2019-11-27 ASSESSMENT — ENCOUNTER SYMPTOMS
CONSTIPATION: 0
BLOOD IN STOOL: 0
BOWEL INCONTINENCE: 0
DIARRHEA: 0
SHORTNESS OF BREATH: 0
CHEST TIGHTNESS: 0

## 2019-12-04 ENCOUNTER — NURSE ONLY (OUTPATIENT)
Dept: FAMILY MEDICINE CLINIC | Age: 34
End: 2019-12-04
Payer: COMMERCIAL

## 2019-12-04 DIAGNOSIS — Z23 NEED FOR IMMUNIZATION AGAINST INFLUENZA: Primary | ICD-10-CM

## 2019-12-04 PROCEDURE — 90471 IMMUNIZATION ADMIN: CPT | Performed by: FAMILY MEDICINE

## 2019-12-04 PROCEDURE — 90686 IIV4 VACC NO PRSV 0.5 ML IM: CPT | Performed by: FAMILY MEDICINE

## 2019-12-07 ENCOUNTER — HOSPITAL ENCOUNTER (OUTPATIENT)
Dept: MRI IMAGING | Age: 34
Discharge: HOME | End: 2019-12-07
Payer: COMMERCIAL

## 2019-12-07 DIAGNOSIS — M54.6 THORACIC SPINE PAIN: ICD-10-CM

## 2019-12-13 ENCOUNTER — HOSPITAL ENCOUNTER (OUTPATIENT)
Dept: MRI IMAGING | Age: 34
Discharge: HOME | End: 2019-12-13
Payer: COMMERCIAL

## 2019-12-13 DIAGNOSIS — M54.6 THORACIC SPINE PAIN: ICD-10-CM

## 2019-12-13 PROCEDURE — 72146 MRI CHEST SPINE W/O DYE: CPT

## 2020-01-06 ENCOUNTER — OFFICE VISIT (OUTPATIENT)
Dept: FAMILY MEDICINE CLINIC | Age: 35
End: 2020-01-06
Payer: COMMERCIAL

## 2020-01-06 VITALS
TEMPERATURE: 98.2 F | BODY MASS INDEX: 27.48 KG/M2 | HEIGHT: 60 IN | SYSTOLIC BLOOD PRESSURE: 120 MMHG | WEIGHT: 140 LBS | OXYGEN SATURATION: 98 % | DIASTOLIC BLOOD PRESSURE: 76 MMHG | RESPIRATION RATE: 16 BRPM | HEART RATE: 98 BPM

## 2020-01-06 PROCEDURE — 99213 OFFICE O/P EST LOW 20 MIN: CPT | Performed by: PHYSICIAN ASSISTANT

## 2020-01-06 PROCEDURE — 1036F TOBACCO NON-USER: CPT | Performed by: PHYSICIAN ASSISTANT

## 2020-01-06 PROCEDURE — G8427 DOCREV CUR MEDS BY ELIG CLIN: HCPCS | Performed by: PHYSICIAN ASSISTANT

## 2020-01-06 PROCEDURE — G8482 FLU IMMUNIZE ORDER/ADMIN: HCPCS | Performed by: PHYSICIAN ASSISTANT

## 2020-01-06 PROCEDURE — G8419 CALC BMI OUT NRM PARAM NOF/U: HCPCS | Performed by: PHYSICIAN ASSISTANT

## 2020-01-06 RX ORDER — PREDNISONE 20 MG/1
40 TABLET ORAL DAILY
Qty: 6 TABLET | Refills: 0 | Status: SHIPPED | OUTPATIENT
Start: 2020-01-06 | End: 2020-01-09

## 2020-01-09 ENCOUNTER — TELEPHONE (OUTPATIENT)
Dept: FAMILY MEDICINE CLINIC | Age: 35
End: 2020-01-09

## 2020-01-09 RX ORDER — DOXYCYCLINE HYCLATE 100 MG/1
100 CAPSULE ORAL 2 TIMES DAILY
Qty: 14 CAPSULE | Refills: 0 | Status: SHIPPED | OUTPATIENT
Start: 2020-01-09 | End: 2020-01-16

## 2020-01-09 RX ORDER — GUAIFENESIN 600 MG/1
TABLET, EXTENDED RELEASE ORAL
Qty: 20 TABLET | Refills: 0 | Status: SHIPPED | OUTPATIENT
Start: 2020-01-09 | End: 2020-03-12

## 2020-01-09 NOTE — TELEPHONE ENCOUNTER
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 Reports she is feeling worse. Left otalgia is worse. Nasal congestion is now green with sinus pressure. Has chills, no documented fever. With a post nasal drip cough. Was in bed all day yesterday on her day off. Denies headache, productive cough, sore throat, dyspnea, chest tightness or wheezing. No rashes. O: Afebrile,  Looks ill, but not toxic. Sounds congested. EOMI, PERRLA, sclera white, conjunctiva moist. Nares boggy and congested. Maxillary sinus tenderness. TMs intact, now with opaque fluid bulge L>R. Throat clear and moist. Bilateral neck lymphadenopathy. Lungs CTA. Heart; RRR. A/P:  Upper Resp Infection- worse. Start doxycycline and mucinex. Rest. Increase fluids. OOW today. RTC if worse.

## 2020-01-09 NOTE — TELEPHONE ENCOUNTER
Pt states her left ear is worse. Now has has chest congestion and freezing. [facility] in [address].

## 2020-01-29 ENCOUNTER — OFFICE VISIT (OUTPATIENT)
Dept: FAMILY MEDICINE CLINIC | Age: 35
End: 2020-01-29
Payer: COMMERCIAL

## 2020-01-29 VITALS
SYSTOLIC BLOOD PRESSURE: 102 MMHG | OXYGEN SATURATION: 99 % | TEMPERATURE: 98.6 F | HEIGHT: 60 IN | WEIGHT: 150.2 LBS | HEART RATE: 86 BPM | DIASTOLIC BLOOD PRESSURE: 82 MMHG | BODY MASS INDEX: 29.49 KG/M2

## 2020-01-29 PROCEDURE — 1036F TOBACCO NON-USER: CPT | Performed by: FAMILY MEDICINE

## 2020-01-29 PROCEDURE — G8427 DOCREV CUR MEDS BY ELIG CLIN: HCPCS | Performed by: FAMILY MEDICINE

## 2020-01-29 PROCEDURE — 99213 OFFICE O/P EST LOW 20 MIN: CPT | Performed by: FAMILY MEDICINE

## 2020-01-29 PROCEDURE — G8419 CALC BMI OUT NRM PARAM NOF/U: HCPCS | Performed by: FAMILY MEDICINE

## 2020-01-29 PROCEDURE — G8482 FLU IMMUNIZE ORDER/ADMIN: HCPCS | Performed by: FAMILY MEDICINE

## 2020-01-29 RX ORDER — PREDNISONE 10 MG/1
TABLET ORAL
Qty: 21 TABLET | Refills: 0 | Status: SHIPPED | OUTPATIENT
Start: 2020-01-29 | End: 2020-02-08

## 2020-01-29 RX ORDER — DEXTROMETHORPHAN HYDROBROMIDE AND PROMETHAZINE HYDROCHLORIDE 15; 6.25 MG/5ML; MG/5ML
5 SYRUP ORAL 4 TIMES DAILY PRN
Qty: 120 ML | Refills: 0 | Status: CANCELLED | OUTPATIENT
Start: 2020-01-29 | End: 2020-02-05

## 2020-01-29 RX ORDER — DOXYCYCLINE HYCLATE 100 MG/1
100 CAPSULE ORAL 2 TIMES DAILY
Qty: 20 CAPSULE | Refills: 0 | Status: SHIPPED | OUTPATIENT
Start: 2020-01-29 | End: 2020-02-08

## 2020-01-29 ASSESSMENT — ENCOUNTER SYMPTOMS
COUGH: 1
COLOR CHANGE: 0
SINUS PRESSURE: 1
DIARRHEA: 1
BLOOD IN STOOL: 0
CHEST TIGHTNESS: 1
CONSTIPATION: 0
ABDOMINAL PAIN: 0
VOICE CHANGE: 1
SHORTNESS OF BREATH: 0

## 2020-01-29 ASSESSMENT — PATIENT HEALTH QUESTIONNAIRE - PHQ9
2. FEELING DOWN, DEPRESSED OR HOPELESS: 0
SUM OF ALL RESPONSES TO PHQ9 QUESTIONS 1 & 2: 0
SUM OF ALL RESPONSES TO PHQ QUESTIONS 1-9: 0
SUM OF ALL RESPONSES TO PHQ QUESTIONS 1-9: 0
1. LITTLE INTEREST OR PLEASURE IN DOING THINGS: 0

## 2020-01-29 NOTE — PROGRESS NOTES
Chief Complaint   Patient presents with    Cough    Chest Congestion       HPI:  Latosha Powell is a 36 y.o. (: 1985) here today   for cough, chest congestion and sinus pressure. Symptoms started about a week ago. She did have diarrhea and chills yesterday. Chest hurts from coughing. Chest feels tight. Patient was recently on doxy for ear issues. She is taking Mucinex for symptomatic relief. Does not feel this is helping. HPI    Patient's medications, allergies, past medical, surgical, social and family histories were reviewed and updated asappropriate. ROS:  Review of Systems   Constitutional: Positive for chills (yesterday). Negative for activity change, appetite change, fatigue and unexpected weight change. HENT: Positive for congestion, sinus pressure and voice change. Negative for ear discharge, ear pain, hearing loss and sneezing. Eyes: Negative for visual disturbance. Respiratory: Positive for cough and chest tightness. Negative for shortness of breath. Cardiovascular: Negative for chest pain and palpitations. Gastrointestinal: Positive for diarrhea (yesterday). Negative for abdominal pain, blood in stool and constipation. Genitourinary: Negative for difficulty urinating and flank pain. Skin: Negative for color change and rash. Neurological: Negative for dizziness. Psychiatric/Behavioral: Negative for sleep disturbance. Prior to Visit Medications    Medication Sig Taking? Authorizing Provider   doxycycline hyclate (VIBRAMYCIN) 100 MG capsule Take 1 capsule by mouth 2 times daily for 10 days Yes Yuriy Gustafson MD   predniSONE (DELTASONE) 10 MG tablet Take 4 tablets by mouth daily for 2 days, THEN 3 tablets daily for 2 days, THEN 2 tablets daily for 2 days, THEN 1 tablet daily for 2 days, THEN 0.5 tablets daily for 2 days.  Yes Yuriy Gustafson MD   diphenhydrAMINE-phenylephrine [facility] NIGHT TIME COUGH/COLD) 6.25-2.5 MG/5ML LIQD Take 15 mLs by mouth every 4 hours as needed (cough) Yes Gigi Michel MD   guaiFENesin (MUCINEX) 600 MG extended release tablet 1-2 tabs every 12 hours as needed for congestion. Yes HARIS Johnston   cetirizine (ZYRTEC) 10 MG tablet Take 10 mg by mouth daily Yes Historical Provider, MD       Allergies   Allergen Reactions    Dilaudid [Hydromorphone Hcl] Other (See Comments)     tachycardia    Adhesive Tape     Amoxicillin Hives    Bactrim [Sulfamethoxazole-Trimethoprim] Swelling    Ceclor [Cefaclor]      Rash      Cephalexin      swelling    Clindamycin/Lincomycin      swelling    Phenergan [Promethazine Hcl]      Increased heart rate    Sulfa Antibiotics      swelling    Zithromax [Azithromycin] Other (See Comments)     Stomach pains       OBJECTIVE:    /82   Pulse 86   Temp 98.6 °F (37 °C) (Tympanic)   Ht 5' (1.524 m)   Wt 150 lb 3.2 oz (68.1 kg)   LMP 02/01/2018   SpO2 99%   BMI 29.33 kg/m²     BP Readings from Last 2 Encounters:   01/29/20 102/82   01/06/20 120/76       Wt Readings from Last 3 Encounters:   01/29/20 150 lb 3.2 oz (68.1 kg)   01/06/20 140 lb (63.5 kg)   11/27/19 146 lb 6.4 oz (66.4 kg)       Physical Exam  Constitutional:       Appearance: She is well-developed. HENT:      Head: Normocephalic and atraumatic. Right Ear: Tympanic membrane and external ear normal.      Left Ear: Tympanic membrane and external ear normal.      Nose:      Right Sinus: No maxillary sinus tenderness or frontal sinus tenderness. Left Sinus: No maxillary sinus tenderness or frontal sinus tenderness. Eyes:      Conjunctiva/sclera: Conjunctivae normal.   Neck:      Trachea: No tracheal deviation. Cardiovascular:      Rate and Rhythm: Normal rate and regular rhythm. Heart sounds: Normal heart sounds. Pulmonary:      Effort: Pulmonary effort is normal.      Breath sounds: Decreased breath sounds present. Abdominal:      Palpations: Abdomen is soft. Tenderness:  There is no abdominal tenderness. Skin:     General: Skin is warm and dry. Neurological:      Mental Status: She is alert and oriented to person, place, and time. Psychiatric:         Mood and Affect: Mood normal.         Behavior: Behavior normal.           ASSESSMENT/PLAN:     1. Acute upper respiratory infection  Given recurrence/ duration of sxs, try meds as listed below. Initially planned on prometh dm, but allergy to prometh listed. Discussed symptomatic tx. Call if not better  - doxycycline hyclate (VIBRAMYCIN) 100 MG capsule; Take 1 capsule by mouth 2 times daily for 10 days  Dispense: 20 capsule; Refill: 0  - predniSONE (DELTASONE) 10 MG tablet; Take 4 tablets by mouth daily for 2 days, THEN 3 tablets daily for 2 days, THEN 2 tablets daily for 2 days, THEN 1 tablet daily for 2 days, THEN 0.5 tablets daily for 2 days. Dispense: 21 tablet; Refill: 0  - diphenhydrAMINE-phenylephrine (DELSYM NIGHT TIME COUGH/COLD) 6.25-2.5 MG/5ML LIQD; Take 15 mLs by mouth every 4 hours as needed (cough)  Dispense: 1 Bottle; Refill: 0          Scribe attestation: Jeanne Mancia am scribing for and in the presence of Normand Hodgkins, MD. Electronically signed by Zohra Guzmán on 1/29/2020 at 11:56 AM      Provider attestation: Elmer Al MD, personally performed the services scribed by the user listed above in my presence, and it is both accurate and complete. I agree with the ROS and Past Histories independently gathered by the clinical support staff and the remaining scribed note accurately describes my personal service to the patient.     [facility]    1/29/2020  11:58 AM

## 2020-02-10 ENCOUNTER — TELEPHONE (OUTPATIENT)
Dept: CARDIOLOGY CLINIC | Age: 35
End: 2020-02-10

## 2020-02-10 NOTE — TELEPHONE ENCOUNTER
Spoke to patient, scheduled her with next available provider on 2/26.  Patient is inquiring what she is to do in the meantime regarding her chest and arm pain

## 2020-02-10 NOTE — TELEPHONE ENCOUNTER
Pt called stating that starting last week, she has had off and on left sided chest pain radiating to her back and her left arm. Feels that something is sitting on her chest. HR has been 80s mainly, at times 120s. Pain began at rest. Is the same pain she felt when she was seen in the office 7/2019. She did not follow up with cards as recommended back then as the pain resolved. Does not want to go to the ER, would like your recommendations. She works close to the office and wants to know if she should be seen today.

## 2020-03-12 ENCOUNTER — OFFICE VISIT (OUTPATIENT)
Dept: CARDIOLOGY CLINIC | Age: 35
End: 2020-03-12
Payer: COMMERCIAL

## 2020-03-12 VITALS
HEART RATE: 85 BPM | SYSTOLIC BLOOD PRESSURE: 108 MMHG | HEIGHT: 60 IN | OXYGEN SATURATION: 99 % | BODY MASS INDEX: 29.17 KG/M2 | DIASTOLIC BLOOD PRESSURE: 78 MMHG | WEIGHT: 148.6 LBS

## 2020-03-12 PROCEDURE — G8482 FLU IMMUNIZE ORDER/ADMIN: HCPCS | Performed by: INTERNAL MEDICINE

## 2020-03-12 PROCEDURE — 0296T PR EXT ECG > 48HR TO 21 DAY RCRD W/CONECT INTL RCRD: CPT | Performed by: INTERNAL MEDICINE

## 2020-03-12 PROCEDURE — G8427 DOCREV CUR MEDS BY ELIG CLIN: HCPCS | Performed by: INTERNAL MEDICINE

## 2020-03-12 PROCEDURE — 1036F TOBACCO NON-USER: CPT | Performed by: INTERNAL MEDICINE

## 2020-03-12 PROCEDURE — 99204 OFFICE O/P NEW MOD 45 MIN: CPT | Performed by: INTERNAL MEDICINE

## 2020-03-12 PROCEDURE — G8419 CALC BMI OUT NRM PARAM NOF/U: HCPCS | Performed by: INTERNAL MEDICINE

## 2020-03-12 NOTE — PROGRESS NOTES
[facility]   CARDIAC EVALUATION NOTE  [phone]      PCP:  River Larose MD    Reason for Consultation/Chief Complaint: CP/SOB    Subjective   History of Present Illness:  Leopold Dadds is a 30 y.o. patient with a history of SVT, Factor V Leiden, and ovarian blood clot who presents with CP. She has been seen for SVT since at least  and an ablation was recommended. Event monitor in 10/2016 showed PSVT and metoprolol was started. She was switched to Cardizem due to fatigue. Stress test in 2016 was normal. Echo in 2016 showed an EF of 55-60%. In 2019 she had an EP study and RFCA for AT. On 02/10/20 she called into the office due to having CP. She was referred to me. Today she reports CP which radiates into her left arm and jaw. She states the symptoms are similar to prior to her AT ablation. She reports she has been exercising but has noticed increasing SOB with normal activity. She reports occasional palpitations. She denies BLE edema, fatigue or syncope. She denies smoking, alcohol, and caffeine. She was on coumadin for blood clot in ovary. She is off the coumadin. She has had a complete hysterectomy. She sees Dr Treva Carvajal for hematology. Past Medical History:   has a past medical history of Anesthesia complication, Blood clot in vein, Factor V deficiency ([address] Hyperlipidemia, and SVT (supraventricular tachycardia) ([address] Surgical History:   has a past surgical history that includes  section; Tonsillectomy; Hysterectomy, total abdominal; Atrial ablation surgery (2019); and Dilation and curettage of uterus. Social History:   reports that she has never smoked. She has never used smokeless tobacco. She reports that she does not drink alcohol or use drugs. Family History:  family history includes Cancer in her father; High Blood Pressure in her mother.       Home Medications:  Were reviewed and are listed in nursing record and/or below  Prior to Admission medications    Medication Sig Start Date End Date Taking? Authorizing Provider   cetirizine (ZYRTEC) 10 MG tablet Take 10 mg by mouth daily   Yes Historical Provider, MD          Allergies:  Dilaudid [hydromorphone hcl]; Adhesive tape; Amoxicillin; Bactrim [sulfamethoxazole-trimethoprim]; Ceclor [cefaclor]; Cephalexin; Clindamycin/lincomycin; Phenergan [promethazine hcl]; Sulfa antibiotics; and Zithromax [azithromycin]     Review of Systems:   A 14 point review of symptoms completed. Pertinent positives identified in the HPI, all other review of symptoms negative as below.       Objective   PHYSICAL EXAM:    Vitals:    03/12/20 1411   BP: 108/78   Pulse: 85   SpO2: 99%    Weight: 148 lb 9.6 oz (67.4 kg)         General Appearance:  Alert, cooperative, no distress, appears stated age   Head:  Normocephalic, without obvious abnormality, atraumatic   Eyes:  PERRL, conjunctiva/corneas clear   Nose: Nares normal, no drainage or sinus tenderness   Throat: Lips, mucosa, and tongue normal   Neck: Supple, symmetrical, trachea midline, no adenopathy, thyroid: not enlarged, symmetric, no tenderness/mass/nodules, no carotid bruit or JVD   Lungs:   Clear to auscultation bilaterally, respirations unlabored   Chest Wall:  No deformity or tenderness   Heart:  Regular rate and rhythm, S1, S2 normal, no murmur, rub or gallop   Abdomen:   Soft, non-tender, bowel sounds active all four quadrants,  no masses, no organomegaly   Extremities: Extremities normal, atraumatic, no cyanosis or edema   Pulses: 2+ and symmetric   Skin: Skin color, texture, turgor normal, no rashes or lesions   Pysch: Normal mood and affect   Neurologic: Normal gross motor and sensory exam.         Labs   CBC:   Lab Results   Component Value Date    WBC 8.8 02/12/2020    RBC 4.56 02/12/2020    HGB 13.4 02/12/2020    HCT 40.6 02/12/2020    MCV 89.0 02/12/2020    RDW 12.7 06/13/2019     02/12/2020     06/13/2019     CMP:  Lab Results Thank you for allowing us to participate in the care of Jeffrey Mackenzie. Please call me with any questions [phone]. Sindi Ellison MD, [facility]   Interventional Cardiologist  [place]   [phone] [facility]  [phone] [address] [place] [address]  3/12/2020 2:36 PM    I will address the patient's cardiac risk factors and adjusted pharmacologic treatment as needed. In addition, I have reinforced the need for patient directed risk factor modification. Tobacco use was discussed with the patient and educated on the negative effects and was asked not to use. All questions and concerns were addressed to the patient/family. Alternatives to my treatment were discussed. I, Dr Sindi Ellison, personally performed the services described in this documentation, as scribed by the above signed scribe in my presence. It is both accurate and complete to my knowledge. I agree with the details independently gathered by the clinical support staff and the scribed note accurately describes my personal service to the patient.

## 2020-03-19 ENCOUNTER — TELEPHONE (OUTPATIENT)
Dept: CARDIOLOGY CLINIC | Age: 35
End: 2020-03-19

## 2020-03-19 ENCOUNTER — HOSPITAL ENCOUNTER (OUTPATIENT)
Age: 35
Discharge: HOME | End: 2020-03-19
Payer: COMMERCIAL

## 2020-03-19 LAB
ALBUMIN SERPL-MCNC: 4.2 G/DL (ref 3.4–5)
ALP BLD-CCNC: 81 U/L (ref 40–129)
ALT SERPL-CCNC: 10 U/L (ref 10–40)
AST SERPL-CCNC: 15 U/L (ref 15–37)
BILIRUB SERPL-MCNC: 0.3 MG/DL (ref 0–1)
BILIRUBIN DIRECT: <0.2 MG/DL (ref 0–0.3)
BILIRUBIN, INDIRECT: NORMAL MG/DL (ref 0–1)
CHOLESTEROL, TOTAL: 232 MG/DL (ref 0–199)
HDLC SERPL-MCNC: 45 MG/DL (ref 40–60)
LDL CHOLESTEROL CALCULATED: 157 MG/DL
TOTAL PROTEIN: 7.8 G/DL (ref 6.4–8.2)
TRIGL SERPL-MCNC: 151 MG/DL (ref 0–150)
TSH REFLEX: 2.18 UIU/ML (ref 0.27–4.2)
VLDLC SERPL CALC-MCNC: 30 MG/DL

## 2020-03-19 PROCEDURE — 84443 ASSAY THYROID STIM HORMONE: CPT

## 2020-03-19 PROCEDURE — 80061 LIPID PANEL: CPT

## 2020-03-19 PROCEDURE — 80076 HEPATIC FUNCTION PANEL: CPT

## 2020-03-19 PROCEDURE — 36415 COLL VENOUS BLD VENIPUNCTURE: CPT

## 2020-03-19 RX ORDER — ATORVASTATIN CALCIUM 40 MG/1
40 TABLET, FILM COATED ORAL NIGHTLY
Qty: 30 TABLET | Refills: 5 | Status: SHIPPED
Start: 2020-03-19 | End: 2020-05-18

## 2020-03-19 NOTE — TELEPHONE ENCOUNTER
----- Message from Edward Evans MD sent at 3/19/2020  1:56 PM EDT -----  Let patient know their lipid test shows high cholesterol, recommend lipitor 40mg po qhs and f/u lipids/lfts in 1mo. Thanks.

## 2020-03-19 NOTE — TELEPHONE ENCOUNTER
Spoke with patient. Message relayed. Voiced understanding. Rx sent to pharmacy and labs orders placed.

## 2020-03-23 ENCOUNTER — OFFICE VISIT (OUTPATIENT)
Dept: FAMILY MEDICINE CLINIC | Age: 35
End: 2020-03-23
Payer: COMMERCIAL

## 2020-03-23 VITALS
DIASTOLIC BLOOD PRESSURE: 66 MMHG | BODY MASS INDEX: 28.47 KG/M2 | SYSTOLIC BLOOD PRESSURE: 100 MMHG | HEART RATE: 82 BPM | RESPIRATION RATE: 16 BRPM | OXYGEN SATURATION: 99 % | HEIGHT: 60 IN | WEIGHT: 145 LBS | TEMPERATURE: 98.2 F

## 2020-03-23 PROCEDURE — G8482 FLU IMMUNIZE ORDER/ADMIN: HCPCS | Performed by: PHYSICIAN ASSISTANT

## 2020-03-23 PROCEDURE — 99213 OFFICE O/P EST LOW 20 MIN: CPT | Performed by: PHYSICIAN ASSISTANT

## 2020-03-23 PROCEDURE — G8419 CALC BMI OUT NRM PARAM NOF/U: HCPCS | Performed by: PHYSICIAN ASSISTANT

## 2020-03-23 PROCEDURE — G8427 DOCREV CUR MEDS BY ELIG CLIN: HCPCS | Performed by: PHYSICIAN ASSISTANT

## 2020-03-23 PROCEDURE — 1036F TOBACCO NON-USER: CPT | Performed by: PHYSICIAN ASSISTANT

## 2020-03-23 RX ORDER — DOXYCYCLINE HYCLATE 100 MG/1
100 CAPSULE ORAL 2 TIMES DAILY
Qty: 14 CAPSULE | Refills: 0 | Status: SHIPPED | OUTPATIENT
Start: 2020-03-23 | End: 2020-03-30

## 2020-03-23 RX ORDER — GUAIFENESIN 600 MG/1
TABLET, EXTENDED RELEASE ORAL
Qty: 30 TABLET | Refills: 0 | Status: SHIPPED | OUTPATIENT
Start: 2020-03-23 | End: 2020-05-18

## 2020-03-23 NOTE — PROGRESS NOTES
rupture of tympanic membrane    2. Otalgia of left ear       Double water intake. Rest.    If unimproved in 7 -10 days return to clinic. Orders Placed This Encounter   Medications    doxycycline hyclate (VIBRAMYCIN) 100 MG capsule     Sig: Take 1 capsule by mouth 2 times daily for 7 days     Dispense:  14 capsule     Refill:  0    guaiFENesin (MUCINEX) 600 MG extended release tablet     Si-2 tabs every 12 hours as needed for congestion. Increase water intake with this medication.      Dispense:  30 tablet     Refill:  0

## 2020-03-25 ENCOUNTER — TELEPHONE (OUTPATIENT)
Dept: CARDIOLOGY CLINIC | Age: 35
End: 2020-03-25

## 2020-03-25 NOTE — TELEPHONE ENCOUNTER
Spoke to pt. Per DR Nedra Nagy, cardiac monitor was normal. Pt only wore for 2 days due to falling off. She is scheduled with [facility] on 03/31/20.

## 2020-03-26 ENCOUNTER — HOSPITAL ENCOUNTER (OUTPATIENT)
Dept: NON INVASIVE DIAGNOSTICS | Age: 35
Discharge: HOME | End: 2020-03-26
Payer: COMMERCIAL

## 2020-03-26 ENCOUNTER — APPOINTMENT (OUTPATIENT)
Dept: CT IMAGING | Age: 35
End: 2020-03-26
Payer: COMMERCIAL

## 2020-03-26 LAB
LV EF: 55 %
LVEF MODALITY: NORMAL

## 2020-03-26 PROCEDURE — 93306 TTE W/DOPPLER COMPLETE: CPT

## 2020-03-26 PROCEDURE — 0298T PR EXT ECG > 48HR TO 21 DAY REVIEW AND INTERPRETATN: CPT | Performed by: INTERNAL MEDICINE

## 2020-03-27 ENCOUNTER — HOSPITAL ENCOUNTER (EMERGENCY)
Age: 35
Discharge: HOME | End: 2020-03-27
Attending: EMERGENCY MEDICINE
Payer: COMMERCIAL

## 2020-03-27 ENCOUNTER — TELEPHONE (OUTPATIENT)
Dept: CARDIOLOGY CLINIC | Age: 35
End: 2020-03-27

## 2020-03-27 VITALS
SYSTOLIC BLOOD PRESSURE: 133 MMHG | OXYGEN SATURATION: 97 % | DIASTOLIC BLOOD PRESSURE: 85 MMHG | TEMPERATURE: 97.9 F | BODY MASS INDEX: 28.27 KG/M2 | RESPIRATION RATE: 16 BRPM | WEIGHT: 144 LBS | HEART RATE: 92 BPM | HEIGHT: 60 IN

## 2020-03-27 PROCEDURE — 99283 EMERGENCY DEPT VISIT LOW MDM: CPT

## 2020-03-27 RX ORDER — TOBRAMYCIN 3 MG/ML
1 SOLUTION/ DROPS OPHTHALMIC EVERY 6 HOURS
Qty: 2.5 ML | Refills: 0 | Status: SHIPPED | OUTPATIENT
Start: 2020-03-27 | End: 2020-04-06

## 2020-03-27 ASSESSMENT — PAIN DESCRIPTION - PAIN TYPE: TYPE: ACUTE PAIN

## 2020-03-27 ASSESSMENT — VISUAL ACUITY
OS: 20/25
OD: 20/25
OU: 20/25

## 2020-03-27 ASSESSMENT — PAIN DESCRIPTION - LOCATION: LOCATION: EYE

## 2020-03-27 ASSESSMENT — ENCOUNTER SYMPTOMS
TROUBLE SWALLOWING: 0
DIARRHEA: 0
PHOTOPHOBIA: 0
VOMITING: 0
NAUSEA: 0
VOICE CHANGE: 0
EYE PAIN: 1
EYE REDNESS: 0
SHORTNESS OF BREATH: 0
EYE DISCHARGE: 0

## 2020-03-27 ASSESSMENT — PAIN - FUNCTIONAL ASSESSMENT: PAIN_FUNCTIONAL_ASSESSMENT: ACTIVITIES ARE NOT PREVENTED

## 2020-03-27 ASSESSMENT — PAIN DESCRIPTION - ORIENTATION: ORIENTATION: LEFT

## 2020-03-27 ASSESSMENT — PAIN DESCRIPTION - PROGRESSION: CLINICAL_PROGRESSION: NOT CHANGED

## 2020-03-27 ASSESSMENT — PAIN SCALES - GENERAL: PAINLEVEL_OUTOF10: 3

## 2020-03-27 ASSESSMENT — PAIN DESCRIPTION - FREQUENCY: FREQUENCY: CONTINUOUS

## 2020-03-27 ASSESSMENT — PAIN DESCRIPTION - ONSET: ONSET: SUDDEN

## 2020-03-27 NOTE — TELEPHONE ENCOUNTER
----- Message from Alex Escobar MD sent at 3/27/2020 12:35 PM EDT -----  Let patient know echo test shows normal heart function, no new orders or changes at this time. Thanks.

## 2020-03-27 NOTE — ED PROVIDER NOTES
[facility]  eMERGENCY dEPARTMENT eNCOUnter      Pt Name: José Miguel Lancaster  MRN: [id number]  [place] 1985  Date of evaluation: 3/27/2020  Provider: Anna Mcgarry MD    CHIEF COMPLAINT       Chief Complaint   Patient presents with    Foreign Body in Eye     contact stuck in left eye since this morning, has noted drainage starting. HISTORY OF PRESENT ILLNESS   (Location/Symptom, Timing/Onset, Context/Setting, Quality, Duration, Modifying Factors, Severity)  Note limiting factors. José Miguel Lancaster is a 30 y.o. female who wears contacts who presents with foreign body sensation in her left eye. The patient reports that she attempted to remove her contact but is been able to do so and is concerned that it is stuck in her eye. She denies any fever or drainage. She reports normal vision when wearing her glasses. She reports mild scratchy irritation but denies any sharp pain. She denies any trauma to her eye other than her herself using her fingers to try to manipulate the contact. She reports her symptoms are mild, constant, and unchanged. She denies any known aggravating or alleviating factors. HPI    Nursing Notes were reviewed. REVIEW OFSYSTEMS    (2-9 systems for level 4, 10 or more for level 5)     Review of Systems   Constitutional: Negative for appetite change and fever. HENT: Negative for trouble swallowing and voice change. Eyes: Positive for pain (irritation). Negative for photophobia, discharge, redness and visual disturbance. Respiratory: Negative for shortness of breath. Cardiovascular: Negative for chest pain and palpitations. Gastrointestinal: Negative for diarrhea, nausea and vomiting. Genitourinary: Negative for dysuria. Musculoskeletal: Negative for gait problem. Neurological: Negative for seizures and syncope. Psychiatric/Behavioral: Negative for self-injury and suicidal ideas.        Except as noted above the remainder of the review of systems was reviewed and negative. PAST MEDICAL HISTORY     Past Medical History:   Diagnosis Date    Anesthesia complication     decreased B/P     Blood clot in vein     Right ovary    [facility] V deficiency ([facility]     Hyperlipidemia     no meds    SVT (supraventricular tachycardia) (HCC)          SURGICAL HISTORY       Past Surgical History:   Procedure Laterality Date    ATRIAL ABLATION SURGERY  06/13/2019    SVT, Dr. Sharri Sams      X 3    DILATION AND CURETTAGE OF UTERUS      HYSTERECTOMY, TOTAL ABDOMINAL      TONSILLECTOMY           CURRENT MEDICATIONS       Previous Medications    ATORVASTATIN (LIPITOR) 40 MG TABLET    Take 1 tablet by mouth nightly    CETIRIZINE (ZYRTEC) 10 MG TABLET    Take 10 mg by mouth daily    DOXYCYCLINE HYCLATE (VIBRAMYCIN) 100 MG CAPSULE    Take 1 capsule by mouth 2 times daily for 7 days    GUAIFENESIN (MUCINEX) 600 MG EXTENDED RELEASE TABLET    1-2 tabs every 12 hours as needed for congestion. Increase water intake with this medication. ALLERGIES     Dilaudid [hydromorphone hcl]; Adhesive tape; Amoxicillin; Bactrim [sulfamethoxazole-trimethoprim]; Ceclor [cefaclor]; Cephalexin; Clindamycin/lincomycin; Phenergan [promethazine hcl]; Sulfa antibiotics; and Zithromax [azithromycin]    FAMILY HISTORY       Family History   Problem Relation Age of Onset    High Blood Pressure Mother     Cancer Father           SOCIAL HISTORY       Social History     Socioeconomic History    Marital status:      Spouse name: None    Number of children: None    Years of education: None    Highest education level: None   Occupational History    None   Social Needs    Financial resource strain: None    Food insecurity     Worry: None     Inability: None    Transportation needs     Medical: None     Non-medical: None   Tobacco Use    Smoking status: Never Smoker    Smokeless tobacco: Never Used   Substance and Sexual Activity    Alcohol use:  No  Drug use: No    Sexual activity: Yes   Lifestyle    Physical activity     Days per week: None     Minutes per session: None    Stress: None   Relationships    Social connections     Talks on phone: None     Gets together: None     Attends Jew service: None     Active member of club or organization: None     Attends meetings of clubs or organizations: None     Relationship status: None    Intimate partner violence     Fear of current or ex partner: None     Emotionally abused: None     Physically abused: None     Forced sexual activity: None   Other Topics Concern    None   Social History Narrative    None         PHYSICAL EXAM    (up to 7 for level 4, 8 or more for level 5)     ED Triage Vitals [03/27/20 1807]   BP Temp Temp Source Pulse Resp SpO2 Height Weight   133/85 97.9 °F (36.6 °C) Oral 92 16 97 % 5' (1.524 m) 144 lb (65.3 kg)       Physical Exam  Constitutional:       General: She is not in acute distress. Appearance: She is well-developed. HENT:      Head: Normocephalic and atraumatic. Eyes:      General: Lids are normal. Lids are everted, no foreign bodies appreciated. No scleral icterus. Right eye: No foreign body or discharge. Left eye: No foreign body or discharge. Extraocular Movements: Extraocular movements intact. Conjunctiva/sclera: Conjunctivae normal.      Pupils: Pupils are equal, round, and reactive to light. Right eye: No corneal abrasion or fluorescein uptake. Alton exam negative. Left eye: No corneal abrasion or fluorescein uptake. Alton exam negative. Slit lamp exam:     Right eye: No foreign body. Left eye: No foreign body. Neck:      Vascular: No JVD. Cardiovascular:      Rate and Rhythm: Normal rate. Pulmonary:      Effort: Pulmonary effort is normal. No respiratory distress. Abdominal:      Tenderness: There is no abdominal tenderness. There is no rebound. Musculoskeletal:         General: No deformity. Neurological:      Mental Status: She is alert. EMERGENCY DEPARTMENT COURSE and DIFFERENTIAL DIAGNOSIS/MDM:   Vitals:    Vitals:    03/27/20 1807   BP: 133/85   Pulse: 92   Resp: 16   Temp: 97.9 °F (36.6 °C)   TempSrc: Oral   SpO2: 97%   Weight: 144 lb (65.3 kg)   Height: 5' (1.524 m)         MDM  Eye is anesthetized with tetracaine and thoroughly flushed. Fluorescein is used and [employer] exam was performed with no signs of contact lens or other foreign body on detailed eye exam through full extraocular movements. No signs of fluorescein uptake. Suspect likely eye irritation and do not suspect retained foreign body at this time based on physical exam.  I have recommended fluoroquinolone eyedrops based on the fact that the patient is a contact wearer and has eye irritation with frequent manipulation of the eye today to prevent bacterial infection. I have recommended not wearing contacts for at least 1 week, using glasses for vision, and regularly flushing eye twice a day with clean water. Primary care follow-up as needed. Strict ER return precautions given. The patient expresses understanding and agreement with this plan and is discharged home. Procedures    FINAL IMPRESSION      1. Sensation of foreign body in eye          DISPOSITION/PLAN   DISPOSITION Decision To Discharge 03/27/2020 49:79:00 PM      PATIENT REFERRED TO:  Ilan Aragon MD  [id number] [id number] [facility]  [phone]    In 1 week      [place]. [facility] Emergency Department  [address] [place] [address],[address]  [phone]    If symptoms worsen      DISCHARGE MEDICATIONS:  New Prescriptions    TOBRAMYCIN (TOBREX) 0.3 % OPHTHALMIC SOLUTION    Place 1 drop into the left eye every 6 hours for 10 days          (Please note that portions of this note were completed with a voice recognition program.  Efforts were made to edit the dictations but occasionally words aremis-transcribed. )    Anna Mcgarry MD

## 2020-03-30 NOTE — PROGRESS NOTES
History:   reports that she has never smoked. She has never used smokeless tobacco. She reports that she does not drink alcohol or use drugs. Family History: family history includes Cancer in her father; High Blood Pressure in her mother. Allergies:  Dilaudid [hydromorphone hcl]; Adhesive tape; Amoxicillin; Bactrim [sulfamethoxazole-trimethoprim]; Ceclor [cefaclor]; Cephalexin; Clindamycin/lincomycin; Phenergan [promethazine hcl]; Sulfa antibiotics; and Zithromax [azithromycin]    Home Medications:    Prior to Admission medications    Medication Sig Start Date End Date Taking? Authorizing Provider   tobramycin (TOBREX) 0.3 % ophthalmic solution Place 1 drop into the left eye every 6 hours for 10 days 3/27/20 4/6/20 Yes Vivienne Collet, MD   [facility]) 600 MG extended release tablet 1-2 tabs every 12 hours as needed for congestion. Increase water intake with this medication. 3/23/20  Yes HARIS Hsieh   atorvastatin (LIPITOR) 40 MG tablet Take 1 tablet by mouth nightly 3/19/20  Yes Emerald Malhotra MD   cetirizine (ZYRTEC) 10 MG tablet Take 10 mg by mouth daily   Yes Historical Provider, MD       REVIEW OF SYSTEMS:      All 14-point review of systems are completed and pertinent positives are mentioned in the history of present illness. Other systems are reviewed and are negative. Physical Examination:    /64   Pulse 78   Ht 5' (1.524 m)   Wt 152 lb (68.9 kg)   LMP 02/01/2018   SpO2 98%   BMI 29.69 kg/m²    Constitutional and General Appearance: alert, cooperative, no distress and appears stated age  [de-identified]: PERRL, no cervical lymphadenopathy. No masses palpable. Normal oral mucosa  Respiratory:  · Normal excursion and expansion without use of accessory muscles  · Resp Auscultation: Normal breath sounds without dullness or wheezing  Cardiovascular:  · The apical impulse is not displaced  · Heart tones are crisp and normal. regular S1 and S2.   Abdomen:  · No masses or monitor  3. Follow up in 4-6 weeks    QUALITY MEASURES  1. Tobacco Cessation Counseling: NA  2. Retake of BP if >140/90:   NA  3. Documentation to PCP/referring for new patient:  Sent to PCP at close of office visit  4. CAD patient on anti-platelet: NA  5. CAD patient on STATIN therapy:  NA  6. Patient with CHF and aFib on anticoagulation:  NA     All questions and concerns were addressed to the patient/family. Alternatives to my treatment were discussed. This note was scribed in the presence of Raza Ortega MD by Herminia Tovar RN. Dr. Raza Ortega MD  Electrophysiology  [facility] [place]. [address] [place] [address]. [address]. [place], [address]  Phone: [phone]  Fax: [phone]   3/31/2020     NOTE: This report was transcribed using voice recognition software. Every effort was made to ensure accuracy, however, inadvertent computerized transcription errors may be present. The scribe's documentation has been prepared under my direction and personally reviewed by me in its entirety. I confirm that the note above accurately reflects all work, physical examination, the discussion of treatments and procedures, and medical decision making performed by me. Dov Li MD personally performed the services described in this documentation as scribed by nurse in my presence, and is both accurate and complete.     Electronically signed by Marylee Kief, MD on 3/31/2020 at 8:34 AM

## 2020-03-31 ENCOUNTER — TELEPHONE (OUTPATIENT)
Dept: CARDIOLOGY CLINIC | Age: 35
End: 2020-03-31

## 2020-03-31 ENCOUNTER — OFFICE VISIT (OUTPATIENT)
Dept: CARDIOLOGY CLINIC | Age: 35
End: 2020-03-31
Payer: COMMERCIAL

## 2020-03-31 VITALS
BODY MASS INDEX: 29.84 KG/M2 | HEIGHT: 60 IN | WEIGHT: 152 LBS | SYSTOLIC BLOOD PRESSURE: 100 MMHG | OXYGEN SATURATION: 98 % | DIASTOLIC BLOOD PRESSURE: 64 MMHG | HEART RATE: 78 BPM

## 2020-03-31 PROBLEM — R06.02 SHORT OF BREATH ON EXERTION: Status: ACTIVE | Noted: 2020-03-31

## 2020-03-31 PROCEDURE — 99214 OFFICE O/P EST MOD 30 MIN: CPT | Performed by: INTERNAL MEDICINE

## 2020-03-31 PROCEDURE — 1036F TOBACCO NON-USER: CPT | Performed by: INTERNAL MEDICINE

## 2020-03-31 PROCEDURE — G8482 FLU IMMUNIZE ORDER/ADMIN: HCPCS | Performed by: INTERNAL MEDICINE

## 2020-03-31 PROCEDURE — 93000 ELECTROCARDIOGRAM COMPLETE: CPT | Performed by: INTERNAL MEDICINE

## 2020-03-31 PROCEDURE — G8427 DOCREV CUR MEDS BY ELIG CLIN: HCPCS | Performed by: INTERNAL MEDICINE

## 2020-03-31 PROCEDURE — G8419 CALC BMI OUT NRM PARAM NOF/U: HCPCS | Performed by: INTERNAL MEDICINE

## 2020-03-31 NOTE — LETTER
[address]   Electrophysiology Note      Reason for office visit: Follow-up; Shortness of Breath; Palpitations; and Chest Pain      HISTORY OF PRESENT ILLNESS: Presley Moyer is a 30 y.o. female who presents for follow up for SVT. She has a PMH of SVT, Factor V Leiden, and ovarian blood clot. She was seen for SVT since at least  and an ablation was recommended. GALO in 10/2016 showed PSVT and metoprolol was started. She was switched to Cardizem due to fatigue. Stress test in 2016 was normal.  Echo in 2016 showed an EF of 55-60%. In 2019 she had an EP study and RFCA for AT. Her echo on 3/26/20 was normal with an EF of 55%. She had a normal cardiac event monitor reported on 3/25/20. She wore this for palpitations. Today she states that she gest SOB going up steps. She is having occasional chest pain. She states that feels like she did before she had her ablation. She only wore monitor for a day. It fell off. She is having numbness in her left arm with nausea and chest pain, that felt like someone sitting on her chest.  She states that she felt great after her ablation. She states right now she is tired. She did having swelling in her feet last week. She felt her heart racing while watching TV last night. She sleeps on her side without difficulty. She denies alcohol, smoking or illicit drug use. She is  and has 3 children. EKG today shows SR 76 bpm.  She was tired on metoprolol and cardizem dropped her BP in the past.  Denies fever, chill, vomiting. Past Medical History:   has a past medical history of Anesthesia complication, Blood clot in vein, Factor V deficiency ([address] Hyperlipidemia, and SVT (supraventricular tachycardia) ([address] Past Surgical History:   has a past surgical history that includes  section; Tonsillectomy; Hysterectomy, total abdominal; Atrial ablation surgery (2019); and Dilation and curettage of uterus. · No masses or tenderness  · Bowel sounds present  Extremities:  ·  No Cyanosis or Clubbing  ·  Lower extremity edema: No  · Skin: Warm and dry  Neurological:  · Alert and oriented. · Moves all extremities well  · No abnormalities of mood, affect, memory, mentation, or behavior are noted    DATA:    ECG:  3/31/20 SR 76  ECHO: 3/26/20  Summary   Left ventricular systolic function is normal with ejection fraction   estimated at 55%. No regional wall motion abnormalities. Normal left ventricular diastolic filling pressure. Systolic pulmonary artery pressure (SPAP) is normal estimated at 21 mmHg   (Right atrial pressure of 3 mmHg). No significant valvular heart disease. IMPRESSION:    1. Palpitations  Is a 67-year-old female with a history of atrial tachycardia status post ablation with Dr. Júnior Cunningham in 2019 who presents from home with recurrent palpitations. According to patient over last 3 to 4 months she has been suffering from progressively worsening palpitations associated with chest pain, shortness of breath, dyspnea on exertion, and paresthesias in her left arm. She was seen by Dr. Cierra Hoffman who referred her for stress test which is currently pending. She is not on layo agents because of symptoms. With diltiazem she was hypotensive. With beta-blocker patient felt lethargic and malaise. We discussed potentially undergoing an EP study given that her Holter monitor did not show any significant arrhythmias. We also discussed longer-term monitoring given below but more formation as far as morphology is concerned. Patient would like to go with the latter option which is very reasonable. Therefore place a monitor as soon as possible for 2 weeks in order to be Sure some of her events. All questions and concerns answered. Follow-up with me in 4 to 6 weeks. 2.  Chest pain  Per Dr. Cierra Hoffman. 3.  Shortness of breath   Plan as per above. RECOMMENDATIONS:  1.   Continue with stress test

## 2020-03-31 NOTE — TELEPHONE ENCOUNTER
Monitor placed by [employer]  Length of monitor 30 Day  Monitor ordered by Frannie Beal  [phone]  Kit ID [id number]  Activation successful prior to pt leaving office?  Yes

## 2020-04-14 PROCEDURE — 93268 ECG RECORD/REVIEW: CPT | Performed by: INTERNAL MEDICINE

## 2020-04-15 ENCOUNTER — TELEPHONE (OUTPATIENT)
Dept: CARDIOLOGY CLINIC | Age: 35
End: 2020-04-15

## 2020-05-18 ENCOUNTER — OFFICE VISIT (OUTPATIENT)
Dept: FAMILY MEDICINE CLINIC | Age: 35
End: 2020-05-18
Payer: COMMERCIAL

## 2020-05-18 VITALS
DIASTOLIC BLOOD PRESSURE: 82 MMHG | OXYGEN SATURATION: 98 % | TEMPERATURE: 98.2 F | HEIGHT: 60 IN | WEIGHT: 151.8 LBS | SYSTOLIC BLOOD PRESSURE: 112 MMHG | BODY MASS INDEX: 29.8 KG/M2 | HEART RATE: 82 BPM

## 2020-05-18 PROCEDURE — 1036F TOBACCO NON-USER: CPT | Performed by: FAMILY MEDICINE

## 2020-05-18 PROCEDURE — G8427 DOCREV CUR MEDS BY ELIG CLIN: HCPCS | Performed by: FAMILY MEDICINE

## 2020-05-18 PROCEDURE — 99213 OFFICE O/P EST LOW 20 MIN: CPT | Performed by: FAMILY MEDICINE

## 2020-05-18 PROCEDURE — G8419 CALC BMI OUT NRM PARAM NOF/U: HCPCS | Performed by: FAMILY MEDICINE

## 2020-05-18 RX ORDER — FLUTICASONE PROPIONATE 50 MCG
2 SPRAY, SUSPENSION (ML) NASAL DAILY
Qty: 1 BOTTLE | Refills: 5 | Status: SHIPPED | OUTPATIENT
Start: 2020-05-18 | End: 2020-07-01

## 2020-05-18 ASSESSMENT — ENCOUNTER SYMPTOMS: SHORTNESS OF BREATH: 0

## 2020-05-26 ENCOUNTER — OFFICE VISIT (OUTPATIENT)
Dept: ENT CLINIC | Age: 35
End: 2020-05-26
Payer: COMMERCIAL

## 2020-05-26 ENCOUNTER — PROCEDURE VISIT (OUTPATIENT)
Dept: AUDIOLOGY | Age: 35
End: 2020-05-26
Payer: COMMERCIAL

## 2020-05-26 VITALS
TEMPERATURE: 97.2 F | DIASTOLIC BLOOD PRESSURE: 78 MMHG | SYSTOLIC BLOOD PRESSURE: 112 MMHG | HEART RATE: 99 BPM | WEIGHT: 151.2 LBS | BODY MASS INDEX: 29.53 KG/M2

## 2020-05-26 PROCEDURE — 92567 TYMPANOMETRY: CPT | Performed by: AUDIOLOGIST

## 2020-05-26 PROCEDURE — 99203 OFFICE O/P NEW LOW 30 MIN: CPT | Performed by: OTOLARYNGOLOGY

## 2020-05-26 PROCEDURE — 1036F TOBACCO NON-USER: CPT | Performed by: OTOLARYNGOLOGY

## 2020-05-26 PROCEDURE — 92557 COMPREHENSIVE HEARING TEST: CPT | Performed by: AUDIOLOGIST

## 2020-05-26 PROCEDURE — G8419 CALC BMI OUT NRM PARAM NOF/U: HCPCS | Performed by: OTOLARYNGOLOGY

## 2020-05-26 PROCEDURE — G8427 DOCREV CUR MEDS BY ELIG CLIN: HCPCS | Performed by: OTOLARYNGOLOGY

## 2020-05-26 ASSESSMENT — ENCOUNTER SYMPTOMS
APNEA: 0
TROUBLE SWALLOWING: 0
SORE THROAT: 0
VOICE CHANGE: 0
FACIAL SWELLING: 0
SINUS PRESSURE: 0
SHORTNESS OF BREATH: 0
COUGH: 0
EYE ITCHING: 0

## 2020-05-26 NOTE — PATIENT INSTRUCTIONS
directly into a persons ear      Some additional items that may be helpful:   - Remain patient - this is important for both parties   - Write down items that still cannot be heard/understood. You may write with pen/paper or consider typing/texting on a cell phone or smart device. - If background noise is unavoidable, try to keep yourself in a good position in the room. By sitting at a lopez on the side of the restaurant (preferably a corner), it will be easier to communicate than if you were sitting at a table in the middle with background noise surrounding you. Keep yourself positioned away from music speakers or heavy foot traffic.

## 2020-05-26 NOTE — PROGRESS NOTES
and compliance, Type A tympanogram, consistent with normal middle ear function. Reliability: Good  Transducer: Inserts    See scanned audiogram dated 5/26/2020  for results. PATIENT EDUCATION:       The following items were discussed with the patient:    - Good Communication Strategies    Educational information was shared verbally during appointment. RECOMMENDATIONS:                                                                                                                                                                                                                                                          The following items are recommended based on patient report and results from today's appointment:   - Continue medical follow-up with Jh Elias DO.   - Retest hearing as medically indicated and/or sooner if a change in hearing is noted. - Utilize \"Good Communication Strategies\" as discussed to assist in speech understanding with communication partners.        Tiffani Escobedo  Audiologist    Chart CC'd to: Jh Elias DO      Degree of   Hearing Sensitivity dB Range   Within Normal Limits (WNL) 0 - 20   Mild 20 - 40   Moderate 40 - 55   Moderately-Severe 55 - 70   Severe 70 - 90   Profound 90 +

## 2020-05-26 NOTE — PROGRESS NOTES
[facility] [address], [address]  [place], [place]  P: [phone]       Patient     Alycia Garcia  1985    ChiefComplaint     Chief Complaint   Patient presents with    Hearing Problem     Feels like she can't hear out of her left ear, has been going on since before dianne. Right ear feels achey and like there is fluid behind her ear drum       History of Present Illness     Vandana Lang is a 73-year-old female present today for evaluation of left-sided hearing loss and mild left ear pain. She reports this is been persistent since December 2018. She started on oral steroids at that time without improvement. Since then she is tried Zyrtec twice a day as well as Mucinex and started Flonase last week. Nothing is improved her symptoms. Her right ear started aching when allergies worsened in the spring. Had ear tubes as a child but no difficulty with her ear since. Denies vertigo, tinnitus, otorrhea. Recently seen by dentist who informed her that her wisdom teeth need to come out on both sides. Denies clenching or grinding of teeth.     Past Medical History     Past Medical History:   Diagnosis Date    Anesthesia complication     decreased B/P     Blood clot in vein     Right ovary    Factor V deficiency (HCC)     Hyperlipidemia     no meds    SVT (supraventricular tachycardia) (HCC)        Past Surgical History     Past Surgical History:   Procedure Laterality Date    ATRIAL ABLATION SURGERY  06/13/2019    SVT, Dr. Lara Lopez      X 3    DILATION AND CURETTAGE OF UTERUS      HYSTERECTOMY, TOTAL ABDOMINAL      TONSILLECTOMY         Family History     Family History   Problem Relation Age of Onset    High Blood Pressure Mother     Cancer Father        Social History     Social History     Tobacco Use    Smoking status: Never Smoker    Smokeless tobacco: Never Used   Substance Use Topics    Alcohol use: No    Drug use: No        Allergies Allergies   Allergen Reactions    Dilaudid [Hydromorphone Hcl] Other (See Comments)     tachycardia    Adhesive Tape     Amoxicillin Hives    Bactrim [Sulfamethoxazole-Trimethoprim] Swelling    Ceclor [Cefaclor]      Rash      Cephalexin      swelling    Clindamycin/Lincomycin      swelling    Phenergan [Promethazine Hcl]      Increased heart rate    Sulfa Antibiotics      swelling    Zithromax [Azithromycin] Other (See Comments)     Stomach pains    Morphine Palpitations       Medications     Current Outpatient Medications   Medication Sig Dispense Refill    cetirizine (ZYRTEC) 10 MG tablet Take 10 mg by mouth daily      fluticasone (FLONASE) 50 MCG/ACT nasal spray 2 sprays by Each Nostril route daily 1 Bottle 5     No current facility-administered medications for this visit. Review of Systems     Review of Systems   Constitutional: Negative for appetite change, chills, fatigue, fever and unexpected weight change. HENT: Positive for ear pain and hearing loss. Negative for congestion, ear discharge, facial swelling, nosebleeds, postnasal drip, sinus pressure, sneezing, sore throat, tinnitus, trouble swallowing and voice change. Eyes: Negative for itching. Respiratory: Negative for apnea, cough and shortness of breath. Gastrointestinal:        Negative for dysphasia   Endocrine: Negative for cold intolerance and heat intolerance. Musculoskeletal: Negative for myalgias and neck pain. Skin: Negative for rash. Allergic/Immunologic: Negative for environmental allergies. Neurological: Negative for dizziness and headaches. Psychiatric/Behavioral: Negative for confusion, decreased concentration and sleep disturbance. PhysicalExam     Vitals:    05/26/20 0922   BP: 112/78   Pulse: 99   Temp: 97.2 °F (36.2 °C)   TempSrc: Oral   Weight: 151 lb 3.2 oz (68.6 kg)       Physical Exam  Constitutional:       General: She is not in acute distress.      Appearance: She is well-developed. HENT:      Head: Normocephalic and atraumatic. Right Ear: Tympanic membrane, ear canal and external ear normal. No drainage. No middle ear effusion. Tympanic membrane is not bulging. Tympanic membrane has normal mobility. Left Ear: Tympanic membrane, ear canal and external ear normal. No drainage. No middle ear effusion. Tympanic membrane is not bulging. Tympanic membrane has normal mobility. Ears:      Hoskins exam findings: does not lateralize. Right Rinne: AC > BC. Left Rinne: AC > BC. Nose: Mucosal edema present. No rhinorrhea. Mouth/Throat:      Lips: Pink. Mouth: Mucous membranes are moist.      Tongue: No lesions. Palate: No mass. Pharynx: Oropharynx is clear. Uvula midline. Eyes:      Pupils: Pupils are equal, round, and reactive to light. Neck:      Musculoskeletal: Full passive range of motion without pain. Thyroid: No thyroid mass or thyromegaly. Trachea: Trachea and phonation normal.   Cardiovascular:      Pulses: Normal pulses. Pulmonary:      Effort: Pulmonary effort is normal. No accessory muscle usage or respiratory distress. Breath sounds: No stridor. Lymphadenopathy:      Head:      Right side of head: No submental or submandibular adenopathy. Left side of head: No submental or submandibular adenopathy. Cervical: No cervical adenopathy. Right cervical: No superficial, deep or posterior cervical adenopathy. Left cervical: No superficial, deep or posterior cervical adenopathy. Skin:     General: Skin is warm and dry. Neurological:      Mental Status: She is alert and oriented to person, place, and time. Cranial Nerves: No cranial nerve deficit. Coordination: Coordination normal.      Gait: Gait normal.   Psychiatric:         Thought Content: Thought content normal.                 Assessment and Plan     1.  Left ear pain  -Ears normal on examination without evidence of middle ear

## 2020-06-16 ENCOUNTER — TELEPHONE (OUTPATIENT)
Dept: CARDIOLOGY CLINIC | Age: 35
End: 2020-06-16

## 2020-06-30 ENCOUNTER — TELEPHONE (OUTPATIENT)
Dept: FAMILY MEDICINE CLINIC | Age: 35
End: 2020-06-30

## 2020-06-30 RX ORDER — MONTELUKAST SODIUM 10 MG/1
10 TABLET ORAL NIGHTLY
Qty: 30 TABLET | Refills: 0 | Status: SHIPPED | OUTPATIENT
Start: 2020-06-30 | End: 2020-08-27

## 2020-06-30 NOTE — TELEPHONE ENCOUNTER
[facility] received a call from:pt    Name of Caller:     Relationship to patient:self    Organization name:     Best contact number: [phone]    Reason for call: pt would like for pcp to contact her in regards to an in office visit. Pt is havng some really bad allergy issues she says she cant take it anymore and needs to be seen please contact pt in regards to  This matter.  Thank you

## 2020-07-01 ENCOUNTER — OFFICE VISIT (OUTPATIENT)
Dept: FAMILY MEDICINE CLINIC | Age: 35
End: 2020-07-01
Payer: COMMERCIAL

## 2020-07-01 VITALS
DIASTOLIC BLOOD PRESSURE: 76 MMHG | SYSTOLIC BLOOD PRESSURE: 122 MMHG | OXYGEN SATURATION: 98 % | HEIGHT: 60 IN | BODY MASS INDEX: 29.92 KG/M2 | WEIGHT: 152.4 LBS | TEMPERATURE: 98.3 F | HEART RATE: 92 BPM

## 2020-07-01 PROCEDURE — G8428 CUR MEDS NOT DOCUMENT: HCPCS | Performed by: FAMILY MEDICINE

## 2020-07-01 PROCEDURE — 99213 OFFICE O/P EST LOW 20 MIN: CPT | Performed by: FAMILY MEDICINE

## 2020-07-01 PROCEDURE — G8419 CALC BMI OUT NRM PARAM NOF/U: HCPCS | Performed by: FAMILY MEDICINE

## 2020-07-01 PROCEDURE — 1036F TOBACCO NON-USER: CPT | Performed by: FAMILY MEDICINE

## 2020-07-01 RX ORDER — DOXYCYCLINE HYCLATE 100 MG
100 TABLET ORAL 2 TIMES DAILY
Qty: 20 TABLET | Refills: 0 | Status: SHIPPED | OUTPATIENT
Start: 2020-07-01 | End: 2020-09-09 | Stop reason: SDUPTHER

## 2020-07-01 ASSESSMENT — ENCOUNTER SYMPTOMS
EYE ITCHING: 1
COUGH: 1
SINUS PRESSURE: 1
NAUSEA: 1

## 2020-07-01 NOTE — PROGRESS NOTES
Chief Complaint   Patient presents with    Sinusitis       HPI:  Lisa Ferrell is a 30 y.o. (: 1985) here today   for   Sinusitis   This is a new problem. The current episode started in the past 7 days (2 weeks ago). The problem has been gradually worsening since onset. There has been no fever. Associated symptoms include coughing, headaches, sinus pressure and sneezing. Treatments tried: Mucinex. The treatment provided no relief.   mucinex and inc zyrtec. flonase not helping either. Nausea due to drainage. Patient's medications, allergies, past medical, surgical, social and family histories were reviewed and updated asappropriate. ROS:  Review of Systems   Constitutional: Negative for fever. HENT: Positive for sinus pressure and sneezing. Eyes: Positive for itching. Respiratory: Positive for cough. Gastrointestinal: Positive for nausea. Neurological: Positive for headaches. Prior to Visit Medications    Medication Sig Taking?  Authorizing Provider   doxycycline hyclate (VIBRA-TABS) 100 MG tablet Take 1 tablet by mouth 2 times daily for 10 days Yes Zoë Jimenez MD   cetirizine (ZYRTEC) 10 MG tablet Take 10 mg by mouth daily Yes Historical Provider, MD   montelukast (SINGULAIR) 10 MG tablet Take 1 tablet by mouth nightly  Patient not taking: Reported on 2020  Zoë Jimenez MD       Allergies   Allergen Reactions    Dilaudid [Hydromorphone Hcl] Other (See Comments)     tachycardia    Adhesive Tape     Amoxicillin Hives    Bactrim [Sulfamethoxazole-Trimethoprim] Swelling    Ceclor [Cefaclor]      Rash      Cephalexin      swelling    Clindamycin/Lincomycin      swelling    Phenergan [Promethazine Hcl]      Increased heart rate    Sulfa Antibiotics      swelling    Zithromax [Azithromycin] Other (See Comments)     Stomach pains    Morphine Palpitations       OBJECTIVE:    /76   Pulse 92   Temp 98.3 °F (36.8 °C)   Ht 5' (1.524 m)   Wt 152 lb 6.4 oz (69.1 kg)   [facility] 02/01/2018   SpO2 98%   BMI 29.76 kg/m²     BP Readings from Last 2 Encounters:   07/01/20 122/76   05/26/20 112/78       Wt Readings from Last 3 Encounters:   07/01/20 152 lb 6.4 oz (69.1 kg)   05/26/20 151 lb 3.2 oz (68.6 kg)   05/18/20 151 lb 12.8 oz (68.9 kg)       Physical Exam  Constitutional:       Appearance: She is well-developed. HENT:      Head: Normocephalic and atraumatic. Right Ear: Tympanic membrane and external ear normal.      Left Ear: Tympanic membrane and external ear normal.      Nose:      Right Sinus: Maxillary sinus tenderness present. No frontal sinus tenderness. Left Sinus: Maxillary sinus tenderness present. No frontal sinus tenderness. Mouth/Throat:      Pharynx: No posterior oropharyngeal erythema. Tonsils: No tonsillar exudate. Eyes:      Conjunctiva/sclera: Conjunctivae normal.   Neck:      Trachea: No tracheal deviation. Cardiovascular:      Rate and Rhythm: Normal rate and regular rhythm. Heart sounds: Normal heart sounds. Pulmonary:      Effort: Pulmonary effort is normal.      Breath sounds: Normal breath sounds. Abdominal:      Palpations: Abdomen is soft. Tenderness: There is no abdominal tenderness. Skin:     General: Skin is warm and dry. Neurological:      Mental Status: She is alert and oriented to person, place, and time. Psychiatric:         Mood and Affect: Mood normal.         Behavior: Behavior normal.           ASSESSMENT/PLAN:     1. Acute maxillary sinusitis, recurrence not specified  Symptomatic Therapy Suggested: rest, increase fluids, use mist of vaporizer prn, OTC Flonase and call prn if symptoms persist or worsen     Given sinus pain, abx as below. Call if not better.

## 2020-07-15 ENCOUNTER — OFFICE VISIT (OUTPATIENT)
Dept: FAMILY MEDICINE CLINIC | Age: 35
End: 2020-07-15
Payer: COMMERCIAL

## 2020-07-15 VITALS
DIASTOLIC BLOOD PRESSURE: 76 MMHG | HEART RATE: 99 BPM | OXYGEN SATURATION: 98 % | BODY MASS INDEX: 28.71 KG/M2 | SYSTOLIC BLOOD PRESSURE: 106 MMHG | TEMPERATURE: 97.4 F | WEIGHT: 147 LBS

## 2020-07-15 PROCEDURE — 99213 OFFICE O/P EST LOW 20 MIN: CPT | Performed by: PHYSICIAN ASSISTANT

## 2020-07-15 PROCEDURE — G8419 CALC BMI OUT NRM PARAM NOF/U: HCPCS | Performed by: PHYSICIAN ASSISTANT

## 2020-07-15 PROCEDURE — 1036F TOBACCO NON-USER: CPT | Performed by: PHYSICIAN ASSISTANT

## 2020-07-15 PROCEDURE — G8427 DOCREV CUR MEDS BY ELIG CLIN: HCPCS | Performed by: PHYSICIAN ASSISTANT

## 2020-07-15 RX ORDER — DOXYCYCLINE HYCLATE 100 MG/1
100 CAPSULE ORAL 2 TIMES DAILY
Qty: 14 CAPSULE | Refills: 0 | Status: SHIPPED | OUTPATIENT
Start: 2020-07-15 | End: 2020-07-22

## 2020-07-15 NOTE — PROGRESS NOTES
[address]    CC:    Chief Complaint   Patient presents with    Otalgia     Ear pain x3 days from flight         HISTORY OF PRESENT ILLNESS:      Shaunna Licea is a 30 y.o. female  Right otalgia, feels clogged. With nasal congestion, sneezing. Has h/o sinusus issues. Just returned from [place] via airplane and noticed right ear hurt worse than usual. Denies outer earpain or exudates. Used Singulair, allegra,zlyrtec, mucinex. Last seen by ENT a few months ago and given doxycycline. ROS:  Remaining 14 ROS were reviewed and are unremarkable for other constitutional, EENT, cardiac, pulmonary, GI, , neurologic, musculoskeletal, or integumentary complaints. Past Medical/Surgical/ Social HISTORY: Reviewed and updated. MEDICATIONS/ ALLERGIES:  Reviewed and updated. PHYSICAL EXAMINATION:  Vitals:    07/15/20 0844   BP: 106/76   Pulse: 99   Temp: 97.4 °F (36.3 °C)   SpO2: 98%   Weight: 147 lb (66.7 kg)     GENERAL APPEARANCE:  Looks in no distress. HEAD: Normocephalic. EYES:  EOMI, PERRLA. Conjunctivae pink and moist. Sclera white. EARS:  Negative pinna pull. Canals clear. RIGHT TM intact but  injected with opaque fluid seen in middle ear. LTM is clear. No mastoid tenderness. NOSE : clear rhinorrhea  MOUTH/THROAT:     normal, without erythema, without exudate  NECK:  no adenopathy, thyroid normal to palpation  HEART:  Regular rate and rhythm. No murmurs, rubs, or gallops. LUNGS: no wheezes. No rales or rhonchi. Equal chest percussion. ABDOMEN:  Soft, non-tender. No masses. EXTREMITIES:  Moves all extremities. No edema  NEUROLOGIC: Grossly non focal.   SKIN: Warm, dry without rashes, petechia, or purpura. ADDITIONAL DATA:  Prior notes reviewed. No results found for this visit on 07/15/20. ASSESSMENT/ PLAN:    1. Right otitis media, unspecified otitis media type    2. Acute otalgia, right       Double water intake.  Rest.    Symptom relief with over the counter meds. If unimproved in 7 -10 days return to clinic.   Orders Placed This Encounter   Medications    doxycycline hyclate (VIBRAMYCIN) 100 MG capsule     Sig: Take 1 capsule by mouth 2 times daily for 7 days     Dispense:  14 capsule     Refill:  0

## 2020-07-17 RX ORDER — METHYLPREDNISOLONE 4 MG/1
TABLET ORAL
Qty: 1 KIT | Refills: 0 | Status: SHIPPED | OUTPATIENT
Start: 2020-07-17 | End: 2020-07-23

## 2020-08-27 ENCOUNTER — OFFICE VISIT (OUTPATIENT)
Dept: FAMILY MEDICINE CLINIC | Age: 35
End: 2020-08-27
Payer: COMMERCIAL

## 2020-08-27 VITALS
BODY MASS INDEX: 30.39 KG/M2 | SYSTOLIC BLOOD PRESSURE: 110 MMHG | WEIGHT: 154.8 LBS | DIASTOLIC BLOOD PRESSURE: 76 MMHG | TEMPERATURE: 97.5 F | HEIGHT: 60 IN | OXYGEN SATURATION: 98 % | HEART RATE: 86 BPM

## 2020-08-27 PROCEDURE — G8417 CALC BMI ABV UP PARAM F/U: HCPCS | Performed by: FAMILY MEDICINE

## 2020-08-27 PROCEDURE — G8427 DOCREV CUR MEDS BY ELIG CLIN: HCPCS | Performed by: FAMILY MEDICINE

## 2020-08-27 PROCEDURE — 1036F TOBACCO NON-USER: CPT | Performed by: FAMILY MEDICINE

## 2020-08-27 PROCEDURE — 99213 OFFICE O/P EST LOW 20 MIN: CPT | Performed by: FAMILY MEDICINE

## 2020-08-27 RX ORDER — BUSPIRONE HYDROCHLORIDE 5 MG/1
5 TABLET ORAL 3 TIMES DAILY
COMMUNITY
End: 2020-08-27 | Stop reason: DRUGHIGH

## 2020-08-27 RX ORDER — BUSPIRONE HYDROCHLORIDE 7.5 MG/1
7.5 TABLET ORAL 2 TIMES DAILY
Qty: 60 TABLET | Refills: 5 | Status: SHIPPED | OUTPATIENT
Start: 2020-08-27 | End: 2020-09-26

## 2020-08-27 ASSESSMENT — ENCOUNTER SYMPTOMS
NAUSEA: 0
VOMITING: 0
DIARRHEA: 0

## 2020-08-27 NOTE — PROGRESS NOTES
Chief Complaint   Patient presents with    Discuss Medications       HPI:  Reinaldo Bloom is a 30 y.o. (: 1985) here today   for discuss  Buspar. Started on buspar. Seemed to be helping initially. \"on edge\" today. Unsure how much it is helping currently. Has had headache, but that was going on prior to med. Anxiety has improved. Getting less \"worked up\" overall. markos med well. HPI    Patient's medications, allergies, past medical, surgical, social and family histories were reviewed and updated as appropriate. ROS:  Review of Systems   Constitutional: Negative for fever. Cardiovascular: Positive for palpitations (improved). Gastrointestinal: Negative for diarrhea, nausea and vomiting. Psychiatric/Behavioral: Positive for dysphoric mood. The patient is nervous/anxious. Prior to Visit Medications    Medication Sig Taking?  Authorizing Provider   Fexofenadine HCl (ALLEGRA PO) Take by mouth Yes Historical Provider, MD   busPIRone (BUSPAR) 7.5 MG tablet Take 1 tablet by mouth 2 times daily Yes Luba Edwards MD   cetirizine (ZYRTEC) 10 MG tablet Take 10 mg by mouth daily Yes Historical Provider, MD       Allergies   Allergen Reactions    Dilaudid [Hydromorphone Hcl] Other (See Comments)     tachycardia    Adhesive Tape     Amoxicillin Hives    Bactrim [Sulfamethoxazole-Trimethoprim] Swelling    Ceclor [Cefaclor]      Rash      Cephalexin      swelling    Clindamycin/Lincomycin      swelling    Phenergan [Promethazine Hcl]      Increased heart rate    Sulfa Antibiotics      swelling    Zithromax [Azithromycin] Other (See Comments)     Stomach pains    Morphine Palpitations       OBJECTIVE:    /76   Pulse 86   Temp 97.5 °F (36.4 °C)   Ht 5' (1.524 m)   Wt 154 lb 12.8 oz (70.2 kg)   LMP 2018   SpO2 98%   BMI 30.23 kg/m²     BP Readings from Last 2 Encounters:   20 110/76   07/15/20 106/76       Wt Readings from Last 3 Encounters:   20 154

## 2020-09-09 ENCOUNTER — OFFICE VISIT (OUTPATIENT)
Dept: FAMILY MEDICINE CLINIC | Age: 35
End: 2020-09-09
Payer: COMMERCIAL

## 2020-09-09 VITALS
HEIGHT: 60 IN | SYSTOLIC BLOOD PRESSURE: 110 MMHG | HEART RATE: 82 BPM | OXYGEN SATURATION: 98 % | DIASTOLIC BLOOD PRESSURE: 86 MMHG | BODY MASS INDEX: 30.51 KG/M2 | WEIGHT: 155.4 LBS | TEMPERATURE: 97.3 F

## 2020-09-09 PROCEDURE — G8417 CALC BMI ABV UP PARAM F/U: HCPCS | Performed by: FAMILY MEDICINE

## 2020-09-09 PROCEDURE — G8427 DOCREV CUR MEDS BY ELIG CLIN: HCPCS | Performed by: FAMILY MEDICINE

## 2020-09-09 PROCEDURE — 1036F TOBACCO NON-USER: CPT | Performed by: FAMILY MEDICINE

## 2020-09-09 PROCEDURE — 99214 OFFICE O/P EST MOD 30 MIN: CPT | Performed by: FAMILY MEDICINE

## 2020-09-09 RX ORDER — PREDNISONE 20 MG/1
40 TABLET ORAL DAILY
Qty: 10 TABLET | Refills: 0 | Status: SHIPPED | OUTPATIENT
Start: 2020-09-09 | End: 2020-09-14

## 2020-09-09 RX ORDER — DOXYCYCLINE HYCLATE 100 MG
100 TABLET ORAL 2 TIMES DAILY
Qty: 20 TABLET | Refills: 0 | Status: SHIPPED | OUTPATIENT
Start: 2020-09-09 | End: 2021-08-27 | Stop reason: SDUPTHER

## 2020-09-09 RX ORDER — AZELASTINE 1 MG/ML
1 SPRAY, METERED NASAL 2 TIMES DAILY
Qty: 2 BOTTLE | Refills: 1 | Status: SHIPPED | OUTPATIENT
Start: 2020-09-09 | End: 2021-02-10

## 2020-09-09 ASSESSMENT — ENCOUNTER SYMPTOMS
DIARRHEA: 0
NAUSEA: 1
VOMITING: 0
COUGH: 1
SORE THROAT: 1

## 2020-09-09 NOTE — PROGRESS NOTES
Mood and Affect: Mood normal.         Behavior: Behavior normal.           ASSESSMENT/PLAN:     1. Bronchitis  Sig cough and sxs as listed above. abx and steroid given hx. See below. If fails to improve, will need cxr.    - doxycycline hyclate (VIBRA-TABS) 100 MG tablet; Take 1 tablet by mouth 2 times daily for 10 days  Dispense: 20 tablet; Refill: 0  - predniSONE (DELTASONE) 20 MG tablet; Take 2 tablets by mouth daily for 5 days  Dispense: 10 tablet; Refill: 0    2. Acute sinusitis, recurrence not specified, unspecified location  Try adding astelin. rec not taking duplicate antihistamine. Call if not better. - azelastine (ASTELIN) 0.1 % nasal spray; 1 spray by Nasal route 2 times daily Use in each nostril as directed  Dispense: 2 Bottle;  Refill: 1

## 2020-09-11 ENCOUNTER — TELEPHONE (OUTPATIENT)
Dept: FAMILY MEDICINE CLINIC | Age: 35
End: 2020-09-11

## 2020-09-11 RX ORDER — ONDANSETRON 4 MG/1
4 TABLET, FILM COATED ORAL EVERY 8 HOURS PRN
Qty: 20 TABLET | Refills: 0 | Status: SHIPPED | OUTPATIENT
Start: 2020-09-11 | End: 2020-12-04 | Stop reason: SDUPTHER

## 2020-09-11 NOTE — TELEPHONE ENCOUNTER
Her listed allergies significantly limit our options. Nausea is not uncommon w/ that med. rec taking w/ food. If cannot tolerate, I suppose we could try levaquin 500mg daily x 5d. There are risks of other side effects w/ it, but less likely to cause nausea.

## 2020-09-15 ENCOUNTER — HOSPITAL ENCOUNTER (OUTPATIENT)
Dept: GENERAL RADIOLOGY | Age: 35
Discharge: HOME | End: 2020-09-15
Payer: COMMERCIAL

## 2020-09-15 ENCOUNTER — HOSPITAL ENCOUNTER (OUTPATIENT)
Age: 35
Discharge: HOME | End: 2020-09-15
Payer: COMMERCIAL

## 2020-09-15 PROCEDURE — 71046 X-RAY EXAM CHEST 2 VIEWS: CPT

## 2020-09-28 ENCOUNTER — OFFICE VISIT (OUTPATIENT)
Dept: FAMILY MEDICINE CLINIC | Age: 35
End: 2020-09-28
Payer: COMMERCIAL

## 2020-09-28 VITALS
RESPIRATION RATE: 18 BRPM | WEIGHT: 145 LBS | TEMPERATURE: 97.9 F | HEART RATE: 116 BPM | BODY MASS INDEX: 28.47 KG/M2 | OXYGEN SATURATION: 99 % | SYSTOLIC BLOOD PRESSURE: 128 MMHG | DIASTOLIC BLOOD PRESSURE: 92 MMHG | HEIGHT: 60 IN

## 2020-09-28 PROCEDURE — G8417 CALC BMI ABV UP PARAM F/U: HCPCS | Performed by: PHYSICIAN ASSISTANT

## 2020-09-28 PROCEDURE — G8427 DOCREV CUR MEDS BY ELIG CLIN: HCPCS | Performed by: PHYSICIAN ASSISTANT

## 2020-09-28 PROCEDURE — 99213 OFFICE O/P EST LOW 20 MIN: CPT | Performed by: PHYSICIAN ASSISTANT

## 2020-09-28 PROCEDURE — 1036F TOBACCO NON-USER: CPT | Performed by: PHYSICIAN ASSISTANT

## 2020-09-28 NOTE — PROGRESS NOTES
[address]    CC:    Chief Complaint   Patient presents with    Sinusitis     pt states that for the past 2 weeks she has had sinus pain/pressure/congestion         HISTORY OF PRESENT ILLNESS:      Cooper Blackwell is a 30 y.o. female here as per chief complaint. Began 2  days ago. Tried doxycycline 2 weeks ago but not improved. Still having sinus congestion, post nasal drip cough. Took all but last 2 days of doxycycline. Using both Allegra and Zyrtec and tried Mucinex. Denies fever, myalgias/arthralgias, headache, purulent nasal discharge, sneezing, sore throat, shortness of breath, wheezing/chest tightness, dizziness, rash and diarrhea. ROS:  Remaining 14 ROS were reviewed and are unremarkable for other constitutional, EENT, cardiac, pulmonary, GI, , neurologic, musculoskeletal, or integumentary complaints. Past Medical/Surgical/ Social HISTORY: Reviewed and updated. MEDICATIONS/ ALLERGIES:  Reviewed and updated. PHYSICAL EXAMINATION:  Vitals:    09/28/20 0751   BP: (!) 128/92   Pulse: 116   Resp: 18   Temp: 97.9 °F (36.6 °C)   TempSrc: Temporal   SpO2: 99%   Weight: 145 lb (65.8 kg)   Height: 5' (1.524 m)     GENERAL APPEARANCE:  Looks in no distress. HEAD: Normocephalic. EYES:  EOMI, PERRLA. Conjunctivae pink and moist. Sclera white. EARS:  Negative pinna pull. Canals clear. TM's intact with clear  inner ear fluid. No mastoid tenderness. NOSE : clear rhinorrhea  MOUTH/THROAT:     normal, without erythema, without exudate  NECK:  no adenopathy, thyroid normal to palpation  HEART:  Regular rate and rhythm. No murmurs, rubs, or gallops. LUNGS: no wheezes. No rales or rhonchi. Equal chest percussion. ABDOMEN:  Soft, non-tender. No masses. EXTREMITIES:  Moves all extremities. No edema  NEUROLOGIC: Grossly non focal.   SKIN: Warm, dry without rashes, petechia, or purpura. ADDITIONAL DATA:  Prior notes reviewed.   No results found for this visit on 09/28/20. ASSESSMENT/ PLAN:    1. Acute upper respiratory infection      Reassured she is improving. Add saline rinse ( samples). Ok to take a couple hours off work at end of day to rest.  If unimproved in 7 -10 days return to clinic.

## 2020-11-20 ENCOUNTER — TELEPHONE (OUTPATIENT)
Dept: GASTROENTEROLOGY | Age: 35
End: 2020-11-20

## 2020-11-20 NOTE — TELEPHONE ENCOUNTER
US liver order signed to followup on small liver lesion seen on earlier US, needs office followup after US to discuss symptoms

## 2020-11-24 ENCOUNTER — HOSPITAL ENCOUNTER (OUTPATIENT)
Dept: ULTRASOUND IMAGING | Age: 35
Discharge: HOME | End: 2020-11-24
Payer: COMMERCIAL

## 2020-11-24 PROCEDURE — 76705 ECHO EXAM OF ABDOMEN: CPT

## 2020-12-04 RX ORDER — ONDANSETRON 4 MG/1
4 TABLET, FILM COATED ORAL EVERY 8 HOURS PRN
Qty: 20 TABLET | Refills: 0 | Status: SHIPPED | OUTPATIENT
Start: 2020-12-04 | End: 2021-02-10 | Stop reason: SDUPTHER

## 2020-12-11 RX ORDER — DICYCLOMINE HYDROCHLORIDE 10 MG/1
10 CAPSULE ORAL SEE ADMIN INSTRUCTIONS
Qty: 2 CAPSULE | Refills: 0 | Status: SHIPPED | OUTPATIENT
Start: 2020-12-11 | End: 2021-02-10

## 2020-12-16 ENCOUNTER — OFFICE VISIT (OUTPATIENT)
Dept: GASTROENTEROLOGY | Age: 35
End: 2020-12-16
Payer: COMMERCIAL

## 2020-12-16 VITALS
HEIGHT: 60 IN | BODY MASS INDEX: 31.41 KG/M2 | SYSTOLIC BLOOD PRESSURE: 135 MMHG | TEMPERATURE: 97.7 F | DIASTOLIC BLOOD PRESSURE: 82 MMHG | WEIGHT: 160 LBS

## 2020-12-16 PROBLEM — R11.0 NAUSEA: Status: ACTIVE | Noted: 2020-12-16

## 2020-12-16 PROCEDURE — G8417 CALC BMI ABV UP PARAM F/U: HCPCS | Performed by: INTERNAL MEDICINE

## 2020-12-16 PROCEDURE — G8427 DOCREV CUR MEDS BY ELIG CLIN: HCPCS | Performed by: INTERNAL MEDICINE

## 2020-12-16 PROCEDURE — 1036F TOBACCO NON-USER: CPT | Performed by: INTERNAL MEDICINE

## 2020-12-16 PROCEDURE — 99213 OFFICE O/P EST LOW 20 MIN: CPT | Performed by: INTERNAL MEDICINE

## 2020-12-16 PROCEDURE — G8484 FLU IMMUNIZE NO ADMIN: HCPCS | Performed by: INTERNAL MEDICINE

## 2020-12-16 NOTE — PROGRESS NOTES
[address]  [address] [facility]  Phone: [phone]   COI:[phone]    CHIEF COMPLAINT     Chief Complaint   Patient presents with    Follow-up     US/ MRI results         HPI     Cuong Reddy is a 30 y.o. female who presents for followup for abdominal pain. There was concern for some small liver hemangiomas but the most recent study done for this MRI of the liver done 12/15/2020 was normal with no focal liver lesions. Her LFTs 3/2020 were normal.    MRI 12/15/2020  Liver is normal in contour. No focal lesions. No intrahepatic biliary dilatation. No    gallstones. Common bile duct normal in caliber. Pancreas normal in contour. No focal lesions. No ductal dilatation. She has continued to have abdominal pain over the right upper quadrant which radiates to the right side of the back, pain is intermittent but has this every day on and off, moderate to severe in intensity, pain can last several minutes to up to an hour at a time. Associated with nausea but has not had any vomiting. She feels overall the frequency and intensity of pain are worse over the last several months.   Has regular bowel movements    PAST MEDICAL HISTORY     Past Medical History:   Diagnosis Date    Anesthesia complication     decreased B/P     Blood clot in vein     Right ovary    Factor V deficiency ([facility] [address]     Hyperlipidemia     no meds    SVT (supraventricular tachycardia) (HCC)      FAMILY HISTORY     Family History   Problem Relation Age of Onset    High Blood Pressure Mother     Cancer Father      SOCIAL HISTORY     Social History     Socioeconomic History    Marital status: Legally      Spouse name: Not on file    Number of children: Not on file    Years of education: Not on file    Highest education level: Not on file   Occupational History    Not on file   Social Needs    Financial resource strain: Not on file    Food insecurity Worry: Not on file     Inability: Not on file    Transportation needs     Medical: Not on file     Non-medical: Not on file   Tobacco Use    Smoking status: Never Smoker    Smokeless tobacco: Never Used   Substance and Sexual Activity    Alcohol use: No    Drug use: No    Sexual activity: Yes   Lifestyle    Physical activity     Days per week: Not on file     Minutes per session: Not on file    Stress: Not on file   Relationships    Social connections     Talks on phone: Not on file     Gets together: Not on file     Attends [employer] service: Not on file     Active member of club or organization: Not on file     Attends meetings of clubs or organizations: Not on file     Relationship status: Not on file    Intimate partner violence     Fear of current or ex partner: Not on file     Emotionally abused: Not on file     Physically abused: Not on file     Forced sexual activity: Not on file   Other Topics Concern    Not on file   Social History Narrative    Not on file     SURGICAL HISTORY     Past Surgical History:   Procedure Laterality Date    ATRIAL ABLATION SURGERY  06/13/2019    KATIE, Dr. Lady Byrne      X 3    [address] [place] [address], [address] [place] [address]   (This list may include medications prescribed during this encounter as epic can not insert only the list prior to this encounter.)  Current Outpatient Rx   Medication Sig Dispense Refill    dicyclomine (BENTYL) 10 MG capsule Take 1 capsule by mouth See Admin Instructions One capsule by mouth before bedtime on the night prior to the MRI and one capsule one hour prior to the MRI 2 capsule 0    ondansetron (ZOFRAN) 4 MG tablet Take 1 tablet by mouth every 8 hours as needed for Nausea or Vomiting 20 tablet 0    azelastine (ASTELIN) 0.1 % nasal spray 1 spray by Nasal route 2 times daily Use in each nostril as directed 2 Bottle 1  Fexofenadine HCl (ALLEGRA PO) Take by mouth      cetirizine (ZYRTEC) 10 MG tablet Take 10 mg by mouth daily          ALLERGIES     Allergies   Allergen Reactions    Dilaudid [Hydromorphone Hcl] Other (See Comments)     tachycardia    Adhesive Tape     Amoxicillin Hives    Bactrim [Sulfamethoxazole-Trimethoprim] Swelling    Ceclor [Cefaclor]      Rash      Cephalexin      swelling    Clindamycin/Lincomycin      swelling    Phenergan [Promethazine Hcl]      Increased heart rate    Sulfa Antibiotics      swelling    Zithromax [Azithromycin] Other (See Comments)     Stomach pains    Morphine Palpitations       IMMUNIZATIONS     Immunization History   Administered Date(s) Administered    DTaP (Infanrix) 1985, 1985, 09/23/1987, 08/20/1991    Influenza Virus Vaccine 10/05/2020    Influenza, Gelene [place], IM, PF (6 mo and older Fluzone, Flulaval, Fluarix, and 3 yrs and older Afluria) 12/04/2019    MMR 04/16/1991, 04/22/1998    PPD Test 05/01/2018, 05/15/2018    Polio IPV (IPOL) 1985, 1985, 09/23/1987, 08/21/1991       REVIEW OF SYSTEMS     Constitutional: denies fever and unexpected weight change. HENT: Negative for ear pain, hearing loss and nosebleeds. Eyes: Negative for pain and visual disturbance. Respiratory: Negative for cough, shortness of breath and wheezing. Cardiovascular: Negative for chest pain, palpitations and leg swelling. Gastrointestinal: see HPI for details. Musculoskeletal: Positive for arthralgias and back pain. Skin: Negative for pallor and rash. Hematological: Negative for adenopathy. Does not bruise/bleed easily. Psychiatric/Behavioral: Negative for agitation, confusion, hallucinations.     PHYSICAL EXAM   VITAL SIGNS: /82   Temp 97.7 °F (36.5 °C)   Ht 5' (1.524 m)   Wt 160 lb (72.6 kg)   LMP 02/01/2018   BMI 31.25 kg/m²   Wt Readings from Last 3 Encounters:   12/16/20 160 lb (72.6 kg)   09/28/20 145 lb (65.8 kg) 20 155 lb 6.4 oz (70.5 kg)     Gen: AAO3, NAD  HEENT: no pallor or icterus  RS: clear to auscultation bilaterally  CVS: S1S2 RRR, no murmurs  GI: soft, nontender, not distended, BS+, no hepatosplenomegaly  Ext: no edema or clubbing    MRI ABDOMEN W WO CONTRAST  Site: [facility] #: [id number] #: [id number] Procedure: MR Abdomen w/wo Contrast ; Reason for Exam: Dx liver nodule  This document is confidential medical information. Unauthorized disclosure or use of this information is prohibited by law. If you are not the intended recipient of this document, please advise us by calling immediately [phone]. [facility]  [address]    Patient Name: Clayton Rosen  Case ID: [id number]  Patient : 1985  Referring Physician: CITLALLI Elizabeth  Exam Date: 12/15/2020  Exam Description: MR Abdomen w/wo Contrast     HISTORY:  Liver lesion seen ultrasound. TECHNICAL FACTORS:  Long- and short-axis fat- and water-weighted images were obtained before   and after contrast administration. Contrast Type: Dotarem 0.5mmol/ml  15mL prefilled NDC   [id number]  Contrast Amt: 15.    COMPARISON:  Ultrasound of the liver on  1020. FINDINGS:  No cardiomegaly. No pleural pericardial effusion. Liver is normal in contour. No focal lesions. No intrahepatic biliary dilatation. No   gallstones. Common bile duct normal in caliber. Pancreas normal in contour. No focal lesions. No ductal dilatation. Spleen is normal in size. No adrenal nodules. No focal renal lesions. No hydronephrosis. No abdominal lymphadenopathy. Visualized bowel segments within normal limits. Osseous structures normal in signal intensity. CONCLUSION:  Normal MRI the abdomen. No focal liver lesions. Thank you for the opportunity to provide your interpretation. Miller Trent M.D.     A: Kae 12/15/2020 12:40 PM  T: ROSLYN 12/15/2020 11:50 AM FINAL IMPRESSION     Liver lesions (noted on US) were not seen on MRI. Her main issue is right upper quadrant pain, ultrasound was negative for gallstones, will get a HIDA scan given the frequency and intensity of her pain. Check routine labs including LFTs and CBC  May consider surgical referral for cholecystectomy given significant pain and quality of life issues from the pain.

## 2021-02-10 ENCOUNTER — HOSPITAL ENCOUNTER (OUTPATIENT)
Dept: CT IMAGING | Age: 36
Discharge: HOME | End: 2021-02-10
Payer: COMMERCIAL

## 2021-02-10 ENCOUNTER — OFFICE VISIT (OUTPATIENT)
Dept: FAMILY MEDICINE CLINIC | Age: 36
End: 2021-02-10
Payer: COMMERCIAL

## 2021-02-10 VITALS
BODY MASS INDEX: 30.63 KG/M2 | WEIGHT: 156 LBS | TEMPERATURE: 97.2 F | SYSTOLIC BLOOD PRESSURE: 128 MMHG | OXYGEN SATURATION: 98 % | HEIGHT: 60 IN | HEART RATE: 77 BPM | DIASTOLIC BLOOD PRESSURE: 86 MMHG

## 2021-02-10 DIAGNOSIS — R10.31 RIGHT LOWER QUADRANT PAIN: ICD-10-CM

## 2021-02-10 DIAGNOSIS — R10.31 RIGHT LOWER QUADRANT PAIN: Primary | ICD-10-CM

## 2021-02-10 DIAGNOSIS — K76.9 LIVER LESION: ICD-10-CM

## 2021-02-10 DIAGNOSIS — R10.2 SUPRAPUBIC PAIN: ICD-10-CM

## 2021-02-10 LAB
BILIRUBIN, POC: NEGATIVE
BLOOD URINE, POC: NEGATIVE
CLARITY, POC: CLEAR
COLOR, POC: YELLOW
GLUCOSE URINE, POC: NEGATIVE
KETONES, POC: NEGATIVE
LEUKOCYTE EST, POC: NEGATIVE
NITRITE, POC: NEGATIVE
PH, POC: 5.5
PROTEIN, POC: NEGATIVE
SPECIFIC GRAVITY, POC: >=1.03
UROBILINOGEN, POC: 0.2

## 2021-02-10 PROCEDURE — 81002 URINALYSIS NONAUTO W/O SCOPE: CPT | Performed by: FAMILY MEDICINE

## 2021-02-10 PROCEDURE — 74176 CT ABD & PELVIS W/O CONTRAST: CPT

## 2021-02-10 PROCEDURE — 1036F TOBACCO NON-USER: CPT | Performed by: FAMILY MEDICINE

## 2021-02-10 PROCEDURE — G8417 CALC BMI ABV UP PARAM F/U: HCPCS | Performed by: FAMILY MEDICINE

## 2021-02-10 PROCEDURE — G8484 FLU IMMUNIZE NO ADMIN: HCPCS | Performed by: FAMILY MEDICINE

## 2021-02-10 PROCEDURE — 99214 OFFICE O/P EST MOD 30 MIN: CPT | Performed by: FAMILY MEDICINE

## 2021-02-10 PROCEDURE — G8427 DOCREV CUR MEDS BY ELIG CLIN: HCPCS | Performed by: FAMILY MEDICINE

## 2021-02-10 RX ORDER — DICYCLOMINE HCL 20 MG
20 TABLET ORAL EVERY 8 HOURS PRN
Qty: 30 TABLET | Refills: 0 | Status: SHIPPED | OUTPATIENT
Start: 2021-02-10 | End: 2021-06-22

## 2021-02-10 RX ORDER — ONDANSETRON 4 MG/1
4 TABLET, FILM COATED ORAL EVERY 8 HOURS PRN
Qty: 20 TABLET | Refills: 0 | Status: SHIPPED | OUTPATIENT
Start: 2021-02-10 | End: 2021-07-15 | Stop reason: SDUPTHER

## 2021-02-10 RX ORDER — CIPROFLOXACIN 250 MG/1
250 TABLET, FILM COATED ORAL 2 TIMES DAILY
Qty: 14 TABLET | Refills: 0 | Status: SHIPPED | OUTPATIENT
Start: 2021-02-10 | End: 2021-02-17

## 2021-02-10 ASSESSMENT — ENCOUNTER SYMPTOMS
NAUSEA: 1
CONSTIPATION: 0
DIARRHEA: 0
VOMITING: 0
ABDOMINAL PAIN: 1

## 2021-02-10 ASSESSMENT — PATIENT HEALTH QUESTIONNAIRE - PHQ9
SUM OF ALL RESPONSES TO PHQ9 QUESTIONS 1 & 2: 2
1. LITTLE INTEREST OR PLEASURE IN DOING THINGS: 1
SUM OF ALL RESPONSES TO PHQ QUESTIONS 1-9: 2
SUM OF ALL RESPONSES TO PHQ QUESTIONS 1-9: 2

## 2021-02-10 NOTE — PROGRESS NOTES
Chief Complaint   Patient presents with    Abdominal Pain       HPI:  Adelina Goss is a 30 y.o. (: 1985) here today   for   Abdominal Pain  This is a new problem. The current episode started in the past 7 days. The problem occurs intermittently. The problem has been gradually worsening. Associated symptoms include nausea. Pertinent negatives include no constipation, diarrhea, fever or vomiting. Nothing aggravates the pain. The pain is relieved by nothing. pain near scar of csection in location. Feels weak and lightheaded. Pain comes and goes. No changes in bowel habits or fevers. sxs over past week. No pain w/ urination. No urinary freq. Feels similar to prior ovarian cyst.  Only has left ovary, but pain on right side as well. Current pain seems to be in a different location than prior. Took tramadol last pm.  No sig relief. Patient's medications, allergies, past medical, surgical, social and family histories were reviewed and updated as appropriate. ROS:  Review of Systems   Constitutional: Negative for fever. Gastrointestinal: Positive for abdominal pain and nausea. Negative for constipation, diarrhea and vomiting. Prior to Visit Medications    Medication Sig Taking?  Authorizing Provider   ondansetron (ZOFRAN) 4 MG tablet Take 1 tablet by mouth every 8 hours as needed for Nausea or Vomiting Yes Lay Carvajal MD   cetirizine (ZYRTEC) 10 MG tablet Take 10 mg by mouth daily Yes Historical Provider, MD       Allergies   Allergen Reactions    Dilaudid [Hydromorphone Hcl] Other (See Comments)     tachycardia    Adhesive Tape     Amoxicillin Hives    Bactrim [Sulfamethoxazole-Trimethoprim] Swelling    Ceclor [Cefaclor]      Rash      Cephalexin      swelling    Clindamycin/Lincomycin      swelling    Phenergan [Promethazine Hcl]      Increased heart rate    Sulfa Antibiotics      swelling    Zithromax [Azithromycin] Other (See Comments)     Stomach pains  Morphine Palpitations       OBJECTIVE:    /86   Pulse 77   Temp 97.2 °F (36.2 °C)   Ht 5' (1.524 m)   Wt 156 lb (70.8 kg)   LMP 02/01/2018   SpO2 98%   BMI 30.47 kg/m²     BP Readings from Last 2 Encounters:   02/10/21 128/86   12/16/20 135/82       Wt Readings from Last 3 Encounters:   02/10/21 156 lb (70.8 kg)   12/16/20 160 lb (72.6 kg)   09/28/20 145 lb (65.8 kg)       Physical Exam  Constitutional:       Appearance: Normal appearance. HENT:      Head: Normocephalic and atraumatic. Eyes:      Extraocular Movements: Extraocular movements intact. Cardiovascular:      Rate and Rhythm: Normal rate and regular rhythm. Pulmonary:      Effort: Pulmonary effort is normal.      Breath sounds: Normal breath sounds. Abdominal:      Palpations: Abdomen is soft. Tenderness: There is abdominal tenderness. Positive signs include McBurney's sign. Comments: Still w/ mild pain to ruq (near baseline) and to suprapubic area. Most severe pain over rlq at mcburney's point. Neurological:      Mental Status: She is alert. ASSESSMENT/PLAN:     1. Right lower quadrant pain  Hx of ruq pain. Current pain at rlq. Sig pain over mcburney's point, worsening pain, inc severity and inc frequency. Assoc nausea and lightheadedness. Given location and severity, rec CT abd and pelvis stat. ER if worsening sxs in the interim. - POCT Urinalysis no Micro  - CT ABDOMEN PELVIS W IV CONTRAST Additional Contrast? Oral; Future    2. Suprapubic pain  ua neg. No urinary sxs. See above.     - POCT Urinalysis no Micro

## 2021-02-10 NOTE — TELEPHONE ENCOUNTER
Please forward all anticoagulation questions to Dr. Stephan Campos, her hematologist. Ablation is not emerget Negative

## 2021-02-10 NOTE — RESULT ENCOUNTER NOTE
No evidence of appendicitis. Appears negative. rx for bentyl 20mg po q8h prn #30, no rf.  cipro 250mg po bid x 7d to cover for urine and possible bowel issue. Need GI f/u if ongoing issues.

## 2021-03-05 ENCOUNTER — TELEPHONE (OUTPATIENT)
Dept: GASTROENTEROLOGY | Age: 36
End: 2021-03-05

## 2021-06-22 ENCOUNTER — OFFICE VISIT (OUTPATIENT)
Dept: FAMILY MEDICINE CLINIC | Age: 36
End: 2021-06-22
Payer: COMMERCIAL

## 2021-06-22 VITALS
WEIGHT: 158 LBS | HEART RATE: 86 BPM | HEIGHT: 60 IN | SYSTOLIC BLOOD PRESSURE: 122 MMHG | TEMPERATURE: 97.2 F | DIASTOLIC BLOOD PRESSURE: 78 MMHG | OXYGEN SATURATION: 99 % | BODY MASS INDEX: 31.02 KG/M2

## 2021-06-22 DIAGNOSIS — R07.9 CHEST PAIN, UNSPECIFIED TYPE: Primary | ICD-10-CM

## 2021-06-22 DIAGNOSIS — F43.29 STRESS AND ADJUSTMENT REACTION: ICD-10-CM

## 2021-06-22 DIAGNOSIS — F41.9 ANXIETY: ICD-10-CM

## 2021-06-22 PROCEDURE — 1036F TOBACCO NON-USER: CPT | Performed by: PHYSICIAN ASSISTANT

## 2021-06-22 PROCEDURE — G8417 CALC BMI ABV UP PARAM F/U: HCPCS | Performed by: PHYSICIAN ASSISTANT

## 2021-06-22 PROCEDURE — G8427 DOCREV CUR MEDS BY ELIG CLIN: HCPCS | Performed by: PHYSICIAN ASSISTANT

## 2021-06-22 PROCEDURE — 99213 OFFICE O/P EST LOW 20 MIN: CPT | Performed by: PHYSICIAN ASSISTANT

## 2021-06-22 PROCEDURE — 93000 ELECTROCARDIOGRAM COMPLETE: CPT | Performed by: PHYSICIAN ASSISTANT

## 2021-06-22 RX ORDER — CITALOPRAM 10 MG/1
10 TABLET ORAL DAILY
Qty: 30 TABLET | Refills: 3 | Status: SHIPPED | OUTPATIENT
Start: 2021-06-22 | End: 2021-12-27

## 2021-06-22 RX ORDER — HYDROXYZINE PAMOATE 50 MG/1
50 CAPSULE ORAL 2 TIMES DAILY PRN
Qty: 15 CAPSULE | Refills: 0 | Status: SHIPPED | OUTPATIENT
Start: 2021-06-22 | End: 2021-09-23 | Stop reason: SDUPTHER

## 2021-06-22 NOTE — Clinical Note
Dr Amanda Ribeiro,  Pt is MA in our office. She has had increased stressors at home. Findings appear more c/w anxiety induced. Advised her to start ASA 81mg daily along with anxiety meds and to Follow Up with you in a month.

## 2021-06-22 NOTE — PROGRESS NOTES
[address] COMPLAINT  Chief Complaint   Patient presents with    Chest Pain    Nausea       HISTORY OF PRESENT  ILLNESS  Ying Baron is a 29 y.o.  female  C/o recurrent chest pains,left arm pains, and high anxiety over past few weeks. Favorite uncle recently passed. She is taking care of business. Having insomnia. Irritability. Was on Wellbutrin but stopped it. Has not had tachycardia episodes. ROS:  Remaining 14 ROS were reviewed and are unremarkable for other constitutional, EENT, cardiac, pulmonary, GI, , neurologic, musculoskeletal, or integumentary complaints. PAST MEDICAL/SURGICAL, SOCIAL, &  FAMILY HISTORY:  Reviewed and updated accordingly. ALLERGIES :   Dilaudid [hydromorphone hcl], Adhesive tape, Amoxicillin, Bactrim [sulfamethoxazole-trimethoprim], Ceclor [cefaclor], Cephalexin, Clindamycin/lincomycin, Phenergan [promethazine hcl], Sulfa antibiotics, Zithromax [azithromycin], and Morphine    MEDICATIONS:  Prior to Visit Medications    Medication Sig Taking? Authorizing Provider   hydrOXYzine (VISTARIL) 50 MG capsule Take 1 capsule by mouth 2 times daily as needed for Anxiety Yes HARIS Rodriguez   citalopram (CELEXA) 10 MG tablet Take 1 tablet by mouth daily Yes HARIS Rodriguez   ondansetron (ZOFRAN) 4 MG tablet Take 1 tablet by mouth every 8 hours as needed for Nausea or Vomiting Yes Chasity Gonzalez MD   cetirizine (ZYRTEC) 10 MG tablet Take 10 mg by mouth daily Yes Historical Provider, MD         PHYSICAL EXAM     Vitals:    06/22/21 1142   BP: 122/78   Site: Right Upper Arm   Position: Sitting   Cuff Size: Medium Adult   Pulse: 86   Temp: 97.2 °F (36.2 °C)   SpO2: 99%   Weight: 158 lb (71.7 kg)   Height: 5' (1.524 m)   Body mass index is 30.86 kg/m². APPEARANCE: Pleasant, friendly, well-nourished. No acute distress. HEENT: Head: Normocephalic, atraumatic. Eyes: EOMI,PERRLA. Conjunctiva pink and moist. Sclera white.  Ears: Hearing intact. Nose/ Mouth: not examined today. NECK: No lymphadenopathy or masses. Moves neck fully. No JVD or bruits. HEART: Reg rate and rhythm. No murmurs, rubs, or gallops. LUNGS: Clear to auscultation. No wheezes, rales, or rhonchi. ABDOMEN:  Soft, bowel sounds present, non-tender, no masses or organomegaly. No bruits  MUSCULOSKELETAL:  No clubbing, cyanosis or edema. Moves all joints. NEUROLOGIC: Normal gross and sensory exam.  SKIN: Warm, dry, normal turgor. Cap refill <3secs. No rashes, petechiae, purpura. PSYCH: normal affect, pressured speech. ADDITIONAL STUDIES     Pertinent prior laboratory results and/or imaging reviewed. Orders Placed This Encounter   Procedures    Ambulatory referral to Social Work    EKG 12 Lead       IMPRESSION/ PLAN     1. Chest pain, unspecified type    2. Anxiety    3. Stress and adjustment reaction      EKG within normal limits. Past history significant for similar presentation with normal echo and stress test.  Findings today consistent with situational anxiety. Agrees to start Celexa. Discussed medication in detail and recommend staying on for 1 to 2 years minimum. Take Vistaril as needed until Celexa begins working. Referred to therapist.      Follow-up with your primary care in 1 month for possible med adjustment.

## 2021-07-13 ENCOUNTER — OFFICE VISIT (OUTPATIENT)
Dept: PSYCHOLOGY | Age: 36
End: 2021-07-13
Payer: COMMERCIAL

## 2021-07-13 DIAGNOSIS — F43.10 PTSD (POST-TRAUMATIC STRESS DISORDER): Primary | ICD-10-CM

## 2021-07-13 DIAGNOSIS — F33.1 MDD (MAJOR DEPRESSIVE DISORDER), RECURRENT EPISODE, MODERATE (HCC): ICD-10-CM

## 2021-07-13 PROCEDURE — 90791 PSYCH DIAGNOSTIC EVALUATION: CPT | Performed by: SOCIAL WORKER

## 2021-07-13 PROCEDURE — 1036F TOBACCO NON-USER: CPT | Performed by: SOCIAL WORKER

## 2021-07-13 NOTE — PROGRESS NOTES
coherent and goal-directed  Insight: good  Judgment: intact  Ability to understand instructions: Yes  Ability to respond meaningfully: Yes  Morbid Ideation: no   Suicide Assessment: no suicidal ideation, plan, or intent  Homicidal Ideation: no    History:    Medications:   Current Outpatient Medications   Medication Sig Dispense Refill    ondansetron (ZOFRAN) 4 MG tablet Take 1 tablet by mouth every 8 hours as needed for Nausea or Vomiting 20 tablet 0    hydrOXYzine (VISTARIL) 50 MG capsule Take 1 capsule by mouth 2 times daily as needed for Anxiety 15 capsule 0    citalopram (CELEXA) 10 MG tablet Take 1 tablet by mouth daily 30 tablet 3    cetirizine (ZYRTEC) 10 MG tablet Take 10 mg by mouth daily       No current facility-administered medications for this visit. Social History:   Social History     Socioeconomic History    Marital status: Legally      Spouse name: Not on file    Number of children: Not on file    Years of education: Not on file    Highest education level: Not on file   Occupational History    Not on file   Tobacco Use    Smoking status: Never Smoker    Smokeless tobacco: Never Used   Vaping Use    Vaping Use: Never used   Substance and Sexual Activity    Alcohol use: No    Drug use: No    Sexual activity: Yes   Other Topics Concern    Not on file   Social History Narrative    Not on file     Social Determinants of Health     Financial Resource Strain:     Difficulty of Paying Living Expenses:    Food Insecurity:     Worried About Running Out of Food in the Last Year:     [address] in the Last Year:    Transportation Needs:     Lack of Transportation (Medical):      Lack of Transportation (Non-Medical):    Physical Activity:     Days of Exercise per Week:     Minutes of Exercise per Session:    Stress:     Feeling of Stress :    Social Connections:     Frequency of Communication with Friends and Family:     Frequency of Social Gatherings with Friends and Family:     Attends Congregation Services:     Active Member of Clubs or Organizations:     Attends Club or Organization Meetings:     Marital Status:    Intimate Partner Violence:     Fear of Current or Ex-Partner:     Emotionally Abused:     Physically Abused:     Sexually Abused:      TOBACCO:   reports that she has never smoked. She has never used smokeless tobacco.  ETOH:   reports no history of alcohol use. Family History:   Family History   Problem Relation Age of Onset    High Blood Pressure Mother     Cancer Father        A:  Pt struggling with PTSD and depression. Referral to the [address] Other tx options are available if not wanting stress center. No SI/HI, insight and motivation good. Diagnosis:    1. PTSD (post-traumatic stress disorder)    2.  MDD (major depressive disorder), recurrent episode, moderate ([facility] [address]          Diagnosis Date    Anesthesia complication     decreased B/P     Blood clot in vein     Right ovary    Factor V deficiency ([facility] [address]     Hyperlipidemia     no meds    SVT (supraventricular tachycardia) (HCC)      Plan:  Pt interventions:  Provided handout on  anxiety, Trained in strategies for increasing balanced thinking, Discussed and set plan for behavioral activation, Discussed self-care (sleep, nutrition, rewarding activities, social support, exercise), Motivational Interviewing to increase patient confidence and compliance with adhering to behavioral change plan, Motivational Interviewing to determine importance and readiness for change, Established rapport and Supportive techniques    Pt Behavioral Change Plan:   See Pt Instructions

## 2021-07-13 NOTE — PATIENT INSTRUCTIONS
1.  youtube Jeneen Friday \"the power of vulnerability\" and \"listening to shame\"  2.  Call [address] and schedule an appt, [phone]. 3.  Practice diaphragmatic breathing at least 2-3 times a day and anytime you are symptomatic  4.  youtube dr. Jabari Peoples 4-7-8  5. Return to see Kevin Ravi as needed        The Stress Response and How It Can Affect You   The stress response, or fight or flight response is the emergency reaction system of the body. It is there to keep you safe in emergencies. The stress response includes physical and thought responses to your perception of various situations. When the stress response is turned on, your body may release substances like adrenaline and cortisol. Your organs are programmed to respond in certain ways to situations that are viewed as challenging or threatening. The stress response can work against you. You can turn it on when you dont really need it and, as a result, perceive something as an emergency when its really not. It can turn on when you are just thinking about past or future events. Harmless, chronic conditions can be intensified by the stress response activating too often, with too much intensity, or for too long. Stress responses can be different for different individuals. Below is a list of some common stress related responses people have. (Alutiiq the responses you have had in the last 2 weeks.)     Physical Responses   Muscle aches   Insomnia   ? Heart rate   Headache   Weight gain   Nausea   Constipation   Dry mouth   Muscle twitching  Weight loss   Low energy   Weakness   Tight chest   Diarrhea   Dizziness   Trembling   Stomach cramps  Chills    Hot flashes   Sweating   Pounding heart  Choking feeling  Chest pain   Leg cramps   Numb hands/feet Dry throat   Appetite change  Face flushing   ? Blood pressure  Light-headedness  Feeling faint      Troubleswallowing   Rash ?    Urination   Neck pain     Tingling hands/feet     Emotional and Thought Responses   Restlessness   Agitation   Insecurity            Worthlessness   Anxiety   Stress    Depression            Hopelessness   Guilt    Defensiveness  Anger           Racing thoughts   Nightmares   Intense thinking  Sensitivity          Expecting the worst   Numbness   Lack of motivation  Mood swings             Forgetfulness   ? Concentration  Rigidity              Preoccupation  Intolerance     Behavioral Responses   Avoidance   Withdrawal   Neglect   ? Alcohol use    Smoking   ? Eating   Arguing       Poor appearance   ? Spending   Poor hygiene   ? Eating  Seeking reassurance   Nail biting   Skin picking   ? Talking        ? Body checking   Sexual problems  Foot tapping  Fidgeting Rapid walking    ? Exercise   Teeth clenching          Multitasking  Aggressive speaking       ? Fun activities  ? Sleeping       ? Relaxing activities     Seeking information     The parasympathetic nervous system in your body is designed to turn on your bodys relaxation response. Your behaviors and thinking can keep your bodys natural relaxation response from operating at its best.   Getting your body to relax on a daily basis for at least brief periods can help decrease unpleasant stress responses. Learning to relax your body, through specific breathing and relaxation exercises as well as by minimizing stressful thinking, can help your bodys natural relaxation system be more effective. Relaxation:  Diaphragmatic Breathing             ______________________________________________________________________________    1. Sit in a comfortable position    2. Place one hand on your stomach and the other on your chest    3. Try to breathe so that only your stomach rises and falls    As you inhale, concentrate on your chest remaining relatively still while your stomach rises. It may be helpful for you to imagine that your pants are too big and you need to push your stomach out to hold them up.   When exhaling, allow your stomach to fall in and the air to fully escape. Inhale slowly. You may choose to hold the air in for about a second. Exhale slowly. Dont push the air out, but just let the natural pressure of your body slowly move it out. It is normal for this healthy method of breathing to feel a little awkward at first.  With practice, it will feel more natural.    4. Get your mind on your side    One other important factor in getting relaxed is your mind. Your mind and body are connected. The mind influences the body and the body influences the mind. What you do with your mind when you are trying to relax is very important. The key is to avoid thinking about stressful things. You can think about      Neutral things (e.g., counting, saying a word like calm or relax)   Pleasant things (e.g., imagining a pleasant place)    5. It is recommended that you practice 2 times per day, 10 minutes each time. Deep Breathing       What Is Deep Breathing? Deep breathing involves using your diaphragm muscle to help bring about a state of physiological relaxation. The diaphragm is a large muscle that rests across the bottom of your rib cage. When you inhale, the diaphragm muscle drops, opening up space so air can come in. When watching someone do this it looks like your stomach is filling with air. This type of breathing helps activate the part of your nervous system that controls relaxation. It can lead to decreased heart rate, blood pressure, decreased muscle tension, and overall feelings of relaxation. Why Be Concerned With How Im Breathing?  To increase your awareness of the role that breathing plays in increased physical tension and in contributing to increasing your bodys stress response.  To lower your level of stress-related arousal and tension.  To give you a method of taking calm, relaxing breaths to break the cycle of increasing arousal during stressful situations.      What Is the Best Way To Use Deep Breathing Exercises?  Use deep breathing frequently.  Take deep breaths at the first signs of stress, anxiety, physical tension, or other symptoms.  Schedule time for relaxation. Deep Breathing Exercises      Exercise 1:  The Stimulating Breath (also called the Olivier Breath)   Its aim is to raise energy level and increase alertness. - Inhale and exhale rapidly through your nose, keeping your mouth closed but relaxed. Your breaths in and out should be equal in duration, but as short as possible. This is a noisy breathing exercise. - Try for three in-and-out breath cycles per second. This produces a quick movement of the diaphragm, suggesting a olivier. Breathe normally after each cycle. - Do not do for more than 15 seconds on your first try. Each time you practice the Stimulating Breath, you can increase your time by five seconds or so, until you reach a full minute. If done properly, you may feel invigorated, comparable to the heightened awareness you feel after a good workout. You should feel the effort at the back of the neck, the diaphragm, the chest and the abdomen. Try this breathing exercise the next time you need an energy boost and feel yourself reaching for a cup of coffee. Exercise 2:  The 4-7-8 (or Relaxing Breath) Exercise  This exercise is utterly simple, takes almost no time, requires no equipment and can be done anywhere. Although you can do the exercise in any position, sit with your back straight while learning the exercise. Place the tip of your tongue against the ridge of tissue just behind your upper front teeth, and keep it there through the entire exercise. You will be exhaling through your mouth around your tongue; try pursing your lips slightly if this seems awkward.  Exhale completely through your mouth, making a whoosh sound.  Close your mouth and inhale quietly through your nose to a mental count of four.    Hold your breath for a count of seven.   Exhale completely through your mouth, making a whoosh sound to a count of eight.  This is one breath. Now inhale again and repeat the cycle three more times for a total of four breaths. Note that you always inhale quietly through your nose and exhale audibly through your mouth. The tip of your tongue stays in position the whole time. Exhalation takes twice as long as inhalation. The absolute time you spend on each phase is not important; the ratio of 4:7:8 is important. If you have trouble holding your breath, speed the exercise up but keep to the ratio of 4:7:8 for the three phases. With practice you can slow it all down and get used to inhaling and exhaling more and more deeply. This exercise is a natural tranquilizer for the nervous system. Unlike tranquilizing drugs, which are often effective when you first take them but then lose their power over time, this exercise is subtle when you first try it but gains in power with repetition and practice. Do it at least twice a day. You cannot do it too frequently. Do NOT do more than 4 breaths at one time for the first month of practice. Later, if you wish, you can extend it to eight breaths. If you feel a little lightheaded when you first breathe this way, do not be concerned; it will pass. Once you develop this technique by practicing it every day, it will be a very useful tool that you will always have with you. Use it whenever anything upsetting happens - before you react. Use it whenever you are aware of internal tension. Use it to help you fall asleep. This exercise cannot be recommended too highly. Everyone can benefit from it. Exercise 3: Meditation exercise  Breath Counting  If you want to get a feel for this challenging work, try your hand at breath counting, a deceptively simple technique. Sit in a comfortable position with the spine straight and head inclined slightly forward. Gently close your eyes and take a few deep breaths.  Then

## 2021-07-15 ENCOUNTER — OFFICE VISIT (OUTPATIENT)
Dept: FAMILY MEDICINE CLINIC | Age: 36
End: 2021-07-15
Payer: COMMERCIAL

## 2021-07-15 VITALS
OXYGEN SATURATION: 98 % | BODY MASS INDEX: 31.45 KG/M2 | HEART RATE: 82 BPM | HEIGHT: 60 IN | WEIGHT: 160.2 LBS | SYSTOLIC BLOOD PRESSURE: 106 MMHG | DIASTOLIC BLOOD PRESSURE: 78 MMHG

## 2021-07-15 DIAGNOSIS — F41.9 ANXIETY: Primary | ICD-10-CM

## 2021-07-15 DIAGNOSIS — R11.0 NAUSEA: ICD-10-CM

## 2021-07-15 DIAGNOSIS — F43.10 PTSD (POST-TRAUMATIC STRESS DISORDER): ICD-10-CM

## 2021-07-15 PROCEDURE — 99213 OFFICE O/P EST LOW 20 MIN: CPT | Performed by: FAMILY MEDICINE

## 2021-07-15 PROCEDURE — G8417 CALC BMI ABV UP PARAM F/U: HCPCS | Performed by: FAMILY MEDICINE

## 2021-07-15 PROCEDURE — G8427 DOCREV CUR MEDS BY ELIG CLIN: HCPCS | Performed by: FAMILY MEDICINE

## 2021-07-15 PROCEDURE — 1036F TOBACCO NON-USER: CPT | Performed by: FAMILY MEDICINE

## 2021-07-15 RX ORDER — ONDANSETRON 4 MG/1
4 TABLET, FILM COATED ORAL EVERY 8 HOURS PRN
Qty: 20 TABLET | Refills: 0 | Status: SHIPPED | OUTPATIENT
Start: 2021-07-15 | End: 2021-08-27 | Stop reason: SDUPTHER

## 2021-07-15 ASSESSMENT — ENCOUNTER SYMPTOMS: NAUSEA: 1

## 2021-07-15 NOTE — PROGRESS NOTES
Chief Complaint   Patient presents with    Check-Up       HPI:  Cuong Reddy is a 29 y.o. (: 1985) here today   for check up. Had chest pain few weeks ago. Thought related to anxiety. Had ekg done at her office.  ekg reportedly neg. Seen by psychology approx 2 days ago. Referred to  due to hx of PTSD. 12 wk program.      Chest pain improved w/ back on celexa. Nausea still present. Still taking 10mg. Has been on vistaril as well. Has gained some weight w/ med. Has been working on diet and activity. HPI    Patient's medications, allergies, past medical, surgical, social and family histories were reviewed and updated as appropriate. ROS:  Review of Systems   Constitutional: Negative for fever. Gastrointestinal: Positive for nausea. Psychiatric/Behavioral: The patient is nervous/anxious.             LDL Calculated (mg/dL)   Date Value   2020 157 (H)       Past Medical History:   Diagnosis Date    Anesthesia complication     decreased B/P     Blood clot in vein     Right ovary    Factor V deficiency (HCC)     Hyperlipidemia     no meds    SVT (supraventricular tachycardia) (HCC)        Family History   Problem Relation Age of Onset    High Blood Pressure Mother     Cancer Father        Social History     Socioeconomic History    Marital status: Legally      Spouse name: Not on file    Number of children: Not on file    Years of education: Not on file    Highest education level: Not on file   Occupational History    Not on file   Tobacco Use    Smoking status: Never Smoker    Smokeless tobacco: Never Used   Vaping Use    Vaping Use: Never used   Substance and Sexual Activity    Alcohol use: No    Drug use: No    Sexual activity: Yes   Other Topics Concern    Not on file   Social History Narrative    Not on file     Social Determinants of Health     Financial Resource Strain:     Difficulty of Paying Living Expenses:    Food Insecurity:     Worried About Running Out of Food in the Last Year:     Clara of Food in the Last Year:    Transportation Needs:     Lack of Transportation (Medical):  Lack of Transportation (Non-Medical):    Physical Activity:     Days of Exercise per Week:     Minutes of Exercise per Session:    Stress:     Feeling of Stress :    Social Connections:     Frequency of Communication with Friends and Family:     Frequency of Social Gatherings with Friends and Family:     Attends [employer] Services:     Active Member of Clubs or Organizations:     Attends Club or Organization Meetings:     Marital Status:    Intimate Partner Violence:     Fear of Current or Ex-Partner:     Emotionally Abused:     Physically Abused:     Sexually Abused:        Prior to Visit Medications    Medication Sig Taking?  Authorizing Provider   ondansetron (ZOFRAN) 4 MG tablet Take 1 tablet by mouth every 8 hours as needed for Nausea or Vomiting Yes Sancho Mondragon MD   hydrOXYzine (VISTARIL) 50 MG capsule Take 1 capsule by mouth 2 times daily as needed for Anxiety Yes HARIS Wyatt   citalopram (CELEXA) 10 MG tablet Take 1 tablet by mouth daily Yes HARIS Wyatt   cetirizine (ZYRTEC) 10 MG tablet Take 10 mg by mouth daily Yes Historical Provider, MD       Allergies   Allergen Reactions    Dilaudid [Hydromorphone Hcl] Other (See Comments)     tachycardia    Adhesive Tape     Amoxicillin Hives    Bactrim [Sulfamethoxazole-Trimethoprim] Swelling    Ceclor [Cefaclor]      Rash      Cephalexin      swelling    Clindamycin/Lincomycin      swelling    Phenergan [Promethazine Hcl]      Increased heart rate    Sulfa Antibiotics      swelling    Zithromax [Azithromycin] Other (See Comments)     Stomach pains    Morphine Palpitations       OBJECTIVE:    /78   Pulse 82   Ht 5' (1.524 m)   Wt 160 lb 3.2 oz (72.7 kg)   LMP 02/01/2018   SpO2 98%   BMI 31.29 kg/m²     BP Readings from Last 2 Encounters:   07/15/21 106/78

## 2021-08-05 ENCOUNTER — OFFICE VISIT (OUTPATIENT)
Dept: OBGYN CLINIC | Age: 36
End: 2021-08-05
Payer: COMMERCIAL

## 2021-08-05 VITALS
SYSTOLIC BLOOD PRESSURE: 116 MMHG | HEART RATE: 88 BPM | TEMPERATURE: 98.9 F | BODY MASS INDEX: 31.05 KG/M2 | WEIGHT: 159 LBS | DIASTOLIC BLOOD PRESSURE: 82 MMHG

## 2021-08-05 DIAGNOSIS — R10.2 PELVIC PAIN IN FEMALE: ICD-10-CM

## 2021-08-05 DIAGNOSIS — Z01.419 ENCOUNTER FOR ANNUAL ROUTINE GYNECOLOGICAL EXAMINATION: Primary | ICD-10-CM

## 2021-08-05 PROCEDURE — 99385 PREV VISIT NEW AGE 18-39: CPT | Performed by: OBSTETRICS & GYNECOLOGY

## 2021-08-05 ASSESSMENT — ENCOUNTER SYMPTOMS
NAUSEA: 0
CONSTIPATION: 0
VOMITING: 0
DIARRHEA: 0
SHORTNESS OF BREATH: 0
ABDOMINAL PAIN: 1

## 2021-08-05 NOTE — PROGRESS NOTES
Annual Exam      CC:   Chief Complaint   Patient presents with    Gynecologic Exam       HPI:  29 y.o. [id number] presents for her gynecologic annual exam. Had a hysterectomy a few years ago, was having ovarian cysts at that time on the right. Since then has been having pain on the left side as well, has been having pain on and off, every month lately. Can feel it coming on, lasts about a week. Comes and goes, sharp pain. Usually the first week of the month. No aggravating factors, has used tylenol and ibuprofen for it. Was seeing Dr. Rigo Torres previously. Also reports a history of blood clot to her right ovary. 2017. Patient seen and examined. Review of Systems:   Review of Systems   Constitutional: Negative for chills and fever. Respiratory: Negative for shortness of breath. Cardiovascular: Negative for chest pain. Gastrointestinal: Positive for abdominal pain. Negative for constipation, diarrhea, nausea and vomiting. Genitourinary: Positive for pelvic pain. Negative for difficulty urinating, dysuria, vaginal bleeding and vaginal discharge. Neurological: Negative for headaches.        Primary Care Physician: Madi Prater MD    Obstetric History  OB History    Para Term  AB Living   3 3 3     3   SAB TAB Ectopic Molar Multiple Live Births             3      # Outcome Date GA Lbr Mario/2nd Weight Sex Delivery Anes PTL Lv   3 Term 08    F CS-LTranv Spinal  SHANTELLE   2 Term 06    F CS-LTranv Spinal  SHANTELLE   1 Term 10/21/04    F CS-LTranv EPI  SHANTELLE       Gynecologic History  Menstrual History:   LMP: s/p hysterectomy   Menstrual pattern: s/p hysterectomy  Sexual History:   Contraception: s/p hyst   Currently is sexually active   Denies history of STIs   No sexual problems  Pap History:   Last pap smear: last year, normal   History of abnormal pap smears: denies    Medical History:  Past Medical History:   Diagnosis Date    Anesthesia complication     decreased B/P  Blood clot in vein     Right ovary    Complication of anesthesia     Low blood pressure    Depression     Endometriosis     Factor V deficiency (HCC)     Heart disease     Hyperlipidemia     no meds    Overactive bladder     SVT (supraventricular tachycardia) ([facility] [place] [address]     Trauma        Surgical History:  Past Surgical History:   Procedure Laterality Date    ATRIAL ABLATION SURGERY  06/13/2019    SVT, Dr. Feng Leal      X 3   [facility] CURETTAGE OF UTERUS      HYSTERECTOMY, TOTAL ABDOMINAL      TONSILLECTOMY         Medications:  Current Outpatient Medications   Medication Sig Dispense Refill    ondansetron (ZOFRAN) 4 MG tablet Take 1 tablet by mouth every 8 hours as needed for Nausea or Vomiting 20 tablet 0    hydrOXYzine (VISTARIL) 50 MG capsule Take 1 capsule by mouth 2 times daily as needed for Anxiety 15 capsule 0    citalopram (CELEXA) 10 MG tablet Take 1 tablet by mouth daily 30 tablet 3    cetirizine (ZYRTEC) 10 MG tablet Take 10 mg by mouth daily       No current facility-administered medications for this visit. Allergies:   Allergies   Allergen Reactions    Dilaudid [Hydromorphone Hcl] Other (See Comments)     tachycardia    Adhesive Tape     Amoxicillin Hives    Bactrim [Sulfamethoxazole-Trimethoprim] Swelling    Ceclor [Cefaclor]      Rash      Cephalexin      swelling    Clindamycin/Lincomycin      swelling    Phenergan [Promethazine Hcl]      Increased heart rate    Sulfa Antibiotics      swelling    Zithromax [Azithromycin] Other (See Comments)     Stomach pains    Morphine Palpitations       Social History:  Social History     Socioeconomic History    Marital status: Legally      Spouse name: None    Number of children: None    Years of education: None    Highest education level: None   Occupational History    None   Tobacco Use    Smoking status: Never Smoker    Smokeless tobacco: Never Used Vaping Use    Vaping Use: Never used   Substance and Sexual Activity    Alcohol use: Yes     Comment: rarely    Drug use: No    Sexual activity: Yes   Other Topics Concern    None   Social History Narrative    None     Social Determinants of Health     Financial Resource Strain:     Difficulty of Paying Living Expenses:    Food Insecurity:     Worried About Running Out of Food in the Last Year:     [address] in the Last Year:    Transportation Needs:     Lack of Transportation (Medical):  Lack of Transportation (Non-Medical):    Physical Activity:     Days of Exercise per Week:     Minutes of Exercise per Session:    Stress:     Feeling of Stress :    Social Connections:     Frequency of Communication with Friends and Family:     Frequency of Social Gatherings with Friends and Family:     Attends [employer] Services:     Active Member of Clubs or Organizations:     Attends Club or Organization Meetings:     Marital Status:    Intimate Partner Violence:     Fear of Current or Ex-Partner:     Emotionally Abused:     Physically Abused:     Sexually Abused:        Family History:  Family History   Problem Relation Age of Onset    High Blood Pressure Mother     Cancer Father        See above, otherwise denies personal/family history of cervical, uterine, ovarian, vulvar, breast, or colon cancers. Objective: Body mass index is 31.05 kg/m². /82 (Site: Right Upper Arm, Position: Sitting, Cuff Size: Medium Adult)   Pulse 88   Temp 98.9 °F (37.2 °C) (Infrared)   Wt 159 lb (72.1 kg)   LMP 02/01/2018   BMI 31.05 kg/m²     Exam:   Physical Exam  Exam conducted with a chaperone present. Constitutional:       Appearance: She is well-developed. HENT:      Head: Normocephalic and atraumatic. Cardiovascular:      Rate and Rhythm: Normal rate and regular rhythm. Pulmonary:      Effort: Pulmonary effort is normal. No respiratory distress.    Chest:      Breasts:         Right: Normal. No mass, nipple discharge or skin change. Left: Normal. No mass, nipple discharge or skin change. Abdominal:      General: There is no distension. Palpations: Abdomen is soft. Tenderness: There is no abdominal tenderness. There is no guarding or rebound. Genitourinary:     Comments: Pelvic exam: VULVA: normal appearing vulva with no masses, tenderness or lesions, VAGINA: normal appearing vagina with normal color and discharge, no lesions, CERVIX: surgically absent, UTERUS: surgically absent, vaginal cuff well healed, ADNEXA: no masses, mild TTP in LLQ. Musculoskeletal:      Cervical back: Normal range of motion. Neurological:      Mental Status: She is alert and oriented to person, place, and time. Assessment/Plan:  29 y.o. [id number] presenting for her annual exam:    1. Encounter for annual routine gynecological examination  Discussed age appropriate screening recommendations, pap smear no longer indicated. Discussed breast self awareness, STI screening deferred. 2. Pelvic pain in female  Patient with long history of pelvic pain/ovarian cysts. S/p hyst+RSO, now with left ovarian pain as well. Given complicated history with possible blood clots, will review records and discuss management options with patient afterwards. Also consider pelvic US as well at time of follow-up.       Dispo: PRN pending records review  Betsy Suazo MD

## 2021-08-26 ENCOUNTER — VIRTUAL VISIT (OUTPATIENT)
Dept: FAMILY MEDICINE CLINIC | Age: 36
End: 2021-08-26
Payer: COMMERCIAL

## 2021-08-26 DIAGNOSIS — R52 BODY ACHES: ICD-10-CM

## 2021-08-26 DIAGNOSIS — R05.9 COUGH: Primary | ICD-10-CM

## 2021-08-26 DIAGNOSIS — J02.9 SORE THROAT: ICD-10-CM

## 2021-08-26 PROCEDURE — 99213 OFFICE O/P EST LOW 20 MIN: CPT | Performed by: NURSE PRACTITIONER

## 2021-08-26 PROCEDURE — G8417 CALC BMI ABV UP PARAM F/U: HCPCS | Performed by: NURSE PRACTITIONER

## 2021-08-26 PROCEDURE — 1036F TOBACCO NON-USER: CPT | Performed by: NURSE PRACTITIONER

## 2021-08-26 PROCEDURE — G8427 DOCREV CUR MEDS BY ELIG CLIN: HCPCS | Performed by: NURSE PRACTITIONER

## 2021-08-26 ASSESSMENT — ENCOUNTER SYMPTOMS
VOICE CHANGE: 0
BACK PAIN: 1
EYE PAIN: 0
SINUS PAIN: 0
EYE REDNESS: 0
WHEEZING: 0
PHOTOPHOBIA: 0
CONSTIPATION: 0
CHOKING: 0
EYE ITCHING: 0
NAUSEA: 0
SORE THROAT: 1
BLOOD IN STOOL: 0
VOMITING: 0
CHEST TIGHTNESS: 0
COUGH: 1
STRIDOR: 0
SHORTNESS OF BREATH: 0
COLOR CHANGE: 0
SINUS PRESSURE: 0
TROUBLE SWALLOWING: 0
DIARRHEA: 1
EYE DISCHARGE: 0
ABDOMINAL PAIN: 0
RHINORRHEA: 0

## 2021-08-26 NOTE — PROGRESS NOTES
2021    TELEHEALTH EVALUATION -- Audio/Visual (During SVGQI-94 public health emergency)    HPI:    Librado Reeder (:  1985) has requested an audio/video evaluation for the following concern(s):    HPI     Body Aches: Started 3 days ago. She has a bad headache for over a week. She had diarrhea Tuesday and Wednesday. She is having body aches. Today her back started hurting. She does not have fever. She has taken tylenol and zyrtec. She has a sore throat. She has a dry cough. Her daughter is positive for COVID. She had her first dose of a vaccine. She works for [employer]. She will need a note for tomorrow for work. [email or web address]. XD Nutrition    Review of Systems   Constitutional: Positive for activity change, appetite change and fatigue. Negative for chills, diaphoresis, fever and unexpected weight change. HENT: Positive for sore throat. Negative for congestion, ear discharge, ear pain, hearing loss, nosebleeds, postnasal drip, rhinorrhea, sinus pressure, sinus pain, sneezing, tinnitus, trouble swallowing and voice change. Eyes: Negative for photophobia, pain, discharge, redness and itching. Respiratory: Positive for cough. Negative for choking, chest tightness, shortness of breath, wheezing and stridor. Cardiovascular: Negative for chest pain, palpitations and leg swelling. Gastrointestinal: Positive for diarrhea. Negative for abdominal pain, blood in stool, constipation, nausea and vomiting. Endocrine: Negative for cold intolerance, heat intolerance, polydipsia and polyuria. Genitourinary: Negative for difficulty urinating, dysuria, enuresis, flank pain, frequency, hematuria and urgency. Musculoskeletal: Positive for back pain and myalgias. Negative for gait problem, joint swelling, neck pain and neck stiffness. Skin: Negative for color change, pallor, rash and wound. Allergic/Immunologic: Negative for environmental allergies and food allergies.    Neurological: Positive for Neck: [] No visualized mass     Pulmonary/Chest: [x] Respiratory effort normal.  [x] No visualized signs of difficulty breathing or respiratory distress        [] Abnormal-      Musculoskeletal:   [x] Normal gait with no signs of ataxia         [] Normal range of motion of neck        [] Abnormal-       Neurological:        [x] No Facial Asymmetry (Cranial nerve 7 motor function) (limited exam to video visit)          [x] No gaze palsy        [] Abnormal-         Skin:        [x] No significant exanthematous lesions or discoloration noted on facial skin         [] Abnormal-            Psychiatric:       [x] Normal Affect [x] No Hallucinations        [] Abnormal-     Other pertinent observable physical exam findings-     Due to this being a TeleHealth encounter, evaluation of the following organ systems is limited: Vitals/Constitutional/EENT/Resp/CV/GI//MS/Neuro/Skin/Heme-Lymph-Imm. ASSESSMENT/PLAN:  1. Cough    - COVID-19; Future    2. Body aches    - COVID-19; Future    3. Sore throat    - COVID-19; Future    Will consider abx and possible steroid pack pending COVID test results. Recommended saltwater gargles, warm fluids with honey and a humidifier at night. Flonase, Zyrtec, NSAIDS as needed. Follow up if symptoms worsen or do not improve. Maintain hydration by drinking small amounts of clear fluids frequently, then soft diet, and then advance diet as tolerated. May use OTC Imodium if desired for any diarrhea. Call if symptoms worsen, high fever, severe weakness or fainting, increased abdominal pain, blood in stool or vomit, or failure to improve in 2-3 days. Return if symptoms worsen or fail to improve. An  electronic signature was used to authenticate this note.     --AIDAN Velez - CNP on 8/26/2021 at 6:23 PM        Pursuant to the emergency declaration under the [facility], 1135 waiver authority and the [employer] Supplemental Appropriations Act, this Virtual  Visit was conducted, with patient's consent, to reduce the patient's risk of exposure to COVID-19 and provide continuity of care for an established patient. Services were provided through a video synchronous discussion virtually to substitute for in-person clinic visit. This document was prepared by a combination of typing and transcription through a voice recognition software.

## 2021-08-27 DIAGNOSIS — B96.89 ACUTE BACTERIAL SINUSITIS: Primary | ICD-10-CM

## 2021-08-27 DIAGNOSIS — J40 BRONCHITIS: ICD-10-CM

## 2021-08-27 DIAGNOSIS — J01.90 ACUTE BACTERIAL SINUSITIS: Primary | ICD-10-CM

## 2021-08-27 LAB
INTERNAL QC: NORMAL
SARS-COV-2, NAA: NEGATIVE

## 2021-08-27 RX ORDER — DOXYCYCLINE HYCLATE 100 MG
100 TABLET ORAL 2 TIMES DAILY
Qty: 20 TABLET | Refills: 0 | Status: SHIPPED | OUTPATIENT
Start: 2021-08-27 | End: 2021-12-27 | Stop reason: SDUPTHER

## 2021-08-27 RX ORDER — ONDANSETRON 4 MG/1
4 TABLET, FILM COATED ORAL EVERY 8 HOURS PRN
Qty: 20 TABLET | Refills: 0 | Status: SHIPPED | OUTPATIENT
Start: 2021-08-27 | End: 2021-09-23 | Stop reason: SDUPTHER

## 2021-09-23 ENCOUNTER — OFFICE VISIT (OUTPATIENT)
Dept: FAMILY MEDICINE CLINIC | Age: 36
End: 2021-09-23
Payer: COMMERCIAL

## 2021-09-23 VITALS
SYSTOLIC BLOOD PRESSURE: 126 MMHG | DIASTOLIC BLOOD PRESSURE: 68 MMHG | BODY MASS INDEX: 31.57 KG/M2 | OXYGEN SATURATION: 97 % | HEIGHT: 60 IN | WEIGHT: 160.8 LBS | HEART RATE: 90 BPM

## 2021-09-23 DIAGNOSIS — F43.10 PTSD (POST-TRAUMATIC STRESS DISORDER): ICD-10-CM

## 2021-09-23 DIAGNOSIS — F41.9 ANXIETY: Primary | ICD-10-CM

## 2021-09-23 PROCEDURE — 99213 OFFICE O/P EST LOW 20 MIN: CPT | Performed by: FAMILY MEDICINE

## 2021-09-23 PROCEDURE — G8427 DOCREV CUR MEDS BY ELIG CLIN: HCPCS | Performed by: FAMILY MEDICINE

## 2021-09-23 PROCEDURE — G8417 CALC BMI ABV UP PARAM F/U: HCPCS | Performed by: FAMILY MEDICINE

## 2021-09-23 PROCEDURE — 1036F TOBACCO NON-USER: CPT | Performed by: FAMILY MEDICINE

## 2021-09-23 RX ORDER — ONDANSETRON 4 MG/1
4 TABLET, FILM COATED ORAL EVERY 8 HOURS PRN
Qty: 20 TABLET | Refills: 0 | Status: SHIPPED | OUTPATIENT
Start: 2021-09-23 | End: 2021-11-23 | Stop reason: SDUPTHER

## 2021-09-23 RX ORDER — CITALOPRAM 10 MG/1
10 TABLET ORAL DAILY
Qty: 30 TABLET | Refills: 3 | Status: CANCELLED | OUTPATIENT
Start: 2021-09-23

## 2021-09-23 RX ORDER — HYDROXYZINE PAMOATE 50 MG/1
50 CAPSULE ORAL 2 TIMES DAILY PRN
Qty: 45 CAPSULE | Refills: 1 | Status: SHIPPED | OUTPATIENT
Start: 2021-09-23 | End: 2021-12-27

## 2021-09-23 ASSESSMENT — ENCOUNTER SYMPTOMS: SHORTNESS OF BREATH: 0

## 2021-09-23 NOTE — PROGRESS NOTES
Chief Complaint   Patient presents with   Angie Thompson     Patient here for check up. HPI:  Erica Monterroso is a 29 y.o. (: 1985) here today   for check up. HPI  Had been seen by psychology. Was referred to , but does not accept her insurance. Working on other options. Not aware yet. Taking vistaril prn at night. celexa has helped somewhat w/ anxiety, but concern re SE. Has gained approx 15 lbs. Trying to work on diet, but still gaining weight. Patient's medications, allergies, past medical, surgical, social and family histories were reviewed and updated as appropriate. ROS:  Review of Systems   Constitutional: Negative for fever. Respiratory: Negative for shortness of breath. Psychiatric/Behavioral: The patient is nervous/anxious.             LDL Calculated (mg/dL)   Date Value   2020 157 (H)       Past Medical History:   Diagnosis Date    Anesthesia complication     decreased B/P     Blood clot in vein     Right ovary    Complication of anesthesia     Low blood pressure    Depression     Endometriosis     Factor V deficiency (HCC)     Heart disease     Hyperlipidemia     no meds    Overactive bladder     SVT (supraventricular tachycardia) (HCC)     Trauma        Family History   Problem Relation Age of Onset    High Blood Pressure Mother     Cancer Father        Social History     Socioeconomic History    Marital status: Legally      Spouse name: Not on file    Number of children: Not on file    Years of education: Not on file    Highest education level: Not on file   Occupational History    Not on file   Tobacco Use    Smoking status: Never Smoker    Smokeless tobacco: Never Used   Vaping Use    Vaping Use: Never used   Substance and Sexual Activity    Alcohol use: Yes     Comment: rarely    Drug use: No    Sexual activity: Yes   Other Topics Concern    Not on file   Social History Narrative    Not on file     Social Determinants of Health Financial Resource Strain:     Difficulty of Paying Living Expenses:    Food Insecurity:     Worried About Running Out of Food in the Last Year:     [address] in the Last Year:    Transportation Needs:     Lack of Transportation (Medical):  Lack of Transportation (Non-Medical):    Physical Activity:     Days of Exercise per Week:     Minutes of Exercise per Session:    Stress:     Feeling of Stress :    Social Connections:     Frequency of Communication with Friends and Family:     Frequency of Social Gatherings with Friends and Family:     Attends Yarsanism Services:     Active Member of Clubs or Organizations:     Attends Club or Organization Meetings:     Marital Status:    Intimate Partner Violence:     Fear of Current or Ex-Partner:     Emotionally Abused:     Physically Abused:     Sexually Abused:        Prior to Visit Medications    Medication Sig Taking?  Authorizing Provider   hydrOXYzine (VISTARIL) 50 MG capsule Take 1 capsule by mouth 2 times daily as needed for Anxiety Yes Daniel Kwong MD   ondansetron (ZOFRAN) 4 MG tablet Take 1 tablet by mouth every 8 hours as needed for Nausea or Vomiting Yes Daniel Kwong MD   citalopram (CELEXA) 10 MG tablet Take 1 tablet by mouth daily Yes Marsa Ormond, PA   cetirizine (ZYRTEC) 10 MG tablet Take 10 mg by mouth daily Yes Historical Provider, MD       Allergies   Allergen Reactions    Dilaudid [Hydromorphone Hcl] Other (See Comments)     tachycardia    Adhesive Tape     Amoxicillin Hives    Bactrim [Sulfamethoxazole-Trimethoprim] Swelling    Ceclor [Cefaclor]      Rash      Cephalexin      swelling    Clindamycin/Lincomycin      swelling    Phenergan [Promethazine Hcl]      Increased heart rate    Sulfa Antibiotics      swelling    Zithromax [Azithromycin] Other (See Comments)     Stomach pains    Morphine Palpitations       OBJECTIVE:    /68   Pulse 90   Ht 5' (1.524 m)   Wt 160 lb 12.8 oz (72.9 kg)   LMP 02/01/2018   SpO2 97%   BMI 31.40 kg/m²     BP Readings from Last 2 Encounters:   09/23/21 126/68   08/05/21 116/82       Wt Readings from Last 3 Encounters:   09/23/21 160 lb 12.8 oz (72.9 kg)   08/05/21 159 lb (72.1 kg)   07/15/21 160 lb 3.2 oz (72.7 kg)       Physical Exam  Constitutional:       Appearance: Normal appearance. HENT:      Head: Normocephalic and atraumatic. Eyes:      Extraocular Movements: Extraocular movements intact. Cardiovascular:      Rate and Rhythm: Normal rate and regular rhythm. Pulmonary:      Effort: Pulmonary effort is normal.      Breath sounds: Normal breath sounds. Skin:     General: Skin is warm. Neurological:      General: No focal deficit present. Mental Status: She is alert and oriented to person, place, and time. Psychiatric:         Mood and Affect: Mood normal.         Behavior: Behavior normal.           ASSESSMENT/PLAN:    1. Anxiety  Try change to trintellix due to concern re weight gain w/ med. Had seen some improvement. Try change as listed. Refill other meds. See below  - hydrOXYzine (VISTARIL) 50 MG capsule; Take 1 capsule by mouth 2 times daily as needed for Anxiety  Dispense: 45 capsule; Refill: 1  - ondansetron (ZOFRAN) 4 MG tablet; Take 1 tablet by mouth every 8 hours as needed for Nausea or Vomiting  Dispense: 20 tablet; Refill: 0    2. PTSD (post-traumatic stress disorder)  Plans on seeing specialty psych.   Working on referral.

## 2021-10-01 ENCOUNTER — TELEPHONE (OUTPATIENT)
Dept: OBGYN CLINIC | Age: 36
End: 2021-10-01

## 2021-10-01 DIAGNOSIS — R10.32 LEFT LOWER QUADRANT ABDOMINAL PAIN: Primary | ICD-10-CM

## 2021-10-01 NOTE — TELEPHONE ENCOUNTER
Patient called and stated she is having issues with pain in her lower left abdomen and was told to call the office. Ou have no appts available within the next 2 weeks. Stated her pain doubles her over at times. Does come and go. I pended an ultrasound is appropriate. [place] advise. Routing to Dr. Dawood Paredes.

## 2021-10-01 NOTE — TELEPHONE ENCOUNTER
Would give her ER precautions for pain over the weekend. Can offer her an ultrasound at the hospital early next week with follow-up with me in the office afterwards as well. Thanks.

## 2021-10-01 NOTE — TELEPHONE ENCOUNTER
Patient verbalized understanding. Stated she is ok to have ultrasound completed next week and will follow up in office. Please advise. Pended order.

## 2021-10-29 ENCOUNTER — OFFICE VISIT (OUTPATIENT)
Dept: OBGYN CLINIC | Age: 36
End: 2021-10-29
Payer: COMMERCIAL

## 2021-10-29 VITALS
SYSTOLIC BLOOD PRESSURE: 124 MMHG | DIASTOLIC BLOOD PRESSURE: 66 MMHG | BODY MASS INDEX: 31.09 KG/M2 | TEMPERATURE: 97.8 F | HEART RATE: 74 BPM | WEIGHT: 159.2 LBS

## 2021-10-29 DIAGNOSIS — R10.2 PELVIC PAIN: Primary | ICD-10-CM

## 2021-10-29 PROCEDURE — 99213 OFFICE O/P EST LOW 20 MIN: CPT | Performed by: OBSTETRICS & GYNECOLOGY

## 2021-10-29 PROCEDURE — 76856 US EXAM PELVIC COMPLETE: CPT | Performed by: OBSTETRICS & GYNECOLOGY

## 2021-10-29 PROCEDURE — 1036F TOBACCO NON-USER: CPT | Performed by: OBSTETRICS & GYNECOLOGY

## 2021-10-29 PROCEDURE — G8417 CALC BMI ABV UP PARAM F/U: HCPCS | Performed by: OBSTETRICS & GYNECOLOGY

## 2021-10-29 PROCEDURE — G8427 DOCREV CUR MEDS BY ELIG CLIN: HCPCS | Performed by: OBSTETRICS & GYNECOLOGY

## 2021-10-29 PROCEDURE — G8484 FLU IMMUNIZE NO ADMIN: HCPCS | Performed by: OBSTETRICS & GYNECOLOGY

## 2021-10-29 RX ORDER — ACETAMINOPHEN AND CODEINE PHOSPHATE 120; 12 MG/5ML; MG/5ML
1 SOLUTION ORAL DAILY
Qty: 28 TABLET | Refills: 3 | Status: SHIPPED | OUTPATIENT
Start: 2021-10-29 | End: 2021-12-27

## 2021-10-29 NOTE — PROGRESS NOTES
Return Gyn Office Visit    CC:   Chief Complaint   Patient presents with    Pelvic Pain     lower abd/pelvic pain       HPI:  29 y.o. [id number] who presents to office for follow-up of abdominal/pelvic pain. S/p Hyst+RSO, now with left sided pelvic pain. Has had pain since last visit, has been really bad 3-4 times. Otherwise coming and going, lasts a few hours at the longest, took some extra strength tylenol last night which eased the pain. Not every day. +frequency, no dysuria, no hematuria. No malodorous urine. Hyst 2017. Objective:  /66 (Site: Left Upper Arm, Position: Sitting, Cuff Size: Medium Adult)   Pulse 74   Temp 97.8 °F (36.6 °C) (Infrared)   Wt 159 lb 3.2 oz (72.2 kg)   LMP 02/01/2018   BMI 31.09 kg/m²   Physical Exam  Constitutional:       Appearance: Normal appearance. HENT:      Head: Normocephalic. Cardiovascular:      Rate and Rhythm: Normal rate. Pulmonary:      Effort: Pulmonary effort is normal. No respiratory distress. Abdominal:      Palpations: Abdomen is soft. Tenderness: There is no abdominal tenderness. There is no guarding or rebound. Neurological:      General: No focal deficit present. Mental Status: She is alert. Psychiatric:         Mood and Affect: Mood normal.       Imaging:   Impression   PELVIC ULTRASOUND with DOPPLER INTERROGATION    NON OB       DATE: 10/29/2021       PHYSICIAN: Magno Renee M.D.        SONOGRAPHER: Jana Donohue [facility]       INDICATION: pelvic pain       TYPE OF SCAN: vaginal       FINDINGS:     The cul de sac is normal. No free fluid appreciated. The cervix and uterus surgically absent. The right ovary is not visualized.  Ovary findings: No masses seen. The right adnexa is normal.       The left ovary is present.  The left ovary measures 2.59 cm x 2.01 cm x 1.91 cm.     Ovary findings: Simple cyst seen measuring 1.06 cm in width.    The left adnexa is normal.   Doppler interrogation demonstrates normal blood flow to the right and left ovaries.        IMPRESSION: Surgically absent uterus. Non visualized right ovary. Normal appearing left ovary. Imaging is limited secondary to bowel gas. The patient is well aware of the limitations of ultrasound in the detection of anomalies of the abdomen and pelvis.           Assessment/Plan  1. Pelvic pain  Patient reports pain as above continues, US overall wnl aside from small functional cyst. Reviewed management options, do not recommend left oopherectomy at this time given the risks of surgical menopause. Pt is  +for factor V and therefore is not a candidate for estrogen. Reviewed progesterone only options to try to suppress ovulation/pain, pt amenable to this. Declines depo due to risk of weight gain, will attempt micronor. Also reviewed option for dx l/s for possible endometriosis if fails progesterone therapy. All questions answered, rx sent, f/u in 3 months.     Dispo: PRN or 3 months for follow-up  Smith Singh MD

## 2021-11-04 ENCOUNTER — NURSE ONLY (OUTPATIENT)
Dept: OBGYN CLINIC | Age: 36
End: 2021-11-04

## 2021-11-04 ENCOUNTER — TELEPHONE (OUTPATIENT)
Dept: OBGYN CLINIC | Age: 36
End: 2021-11-04

## 2021-11-04 VITALS — BODY MASS INDEX: 31.33 KG/M2 | WEIGHT: 160.4 LBS | TEMPERATURE: 99 F | HEART RATE: 80 BPM

## 2021-11-04 DIAGNOSIS — R10.32 LEFT LOWER QUADRANT ABDOMINAL PAIN: ICD-10-CM

## 2021-11-04 DIAGNOSIS — R10.2 PELVIC PAIN: Primary | ICD-10-CM

## 2021-11-04 LAB
BACTERIA: ABNORMAL /HPF
BILIRUBIN URINE: NEGATIVE
BLOOD, URINE: NEGATIVE
CLARITY: ABNORMAL
COLOR: YELLOW
EPITHELIAL CELLS, UA: 15 /HPF (ref 0–5)
GLUCOSE URINE: NEGATIVE MG/DL
HYALINE CASTS: 2 /LPF (ref 0–8)
KETONES, URINE: NEGATIVE MG/DL
LEUKOCYTE ESTERASE, URINE: NEGATIVE
MICROSCOPIC EXAMINATION: YES
NITRITE, URINE: NEGATIVE
PH UA: 6.5 (ref 5–8)
PROTEIN UA: NEGATIVE MG/DL
RBC UA: 1 /HPF (ref 0–4)
SPECIFIC GRAVITY UA: 1.01 (ref 1–1.03)
URINE TYPE: ABNORMAL
UROBILINOGEN, URINE: 0.2 E.U./DL
WBC UA: 4 /HPF (ref 0–5)

## 2021-11-04 NOTE — TELEPHONE ENCOUNTER
Pt states she was in office 10/29/21 and left a urine. She said she was to have an STI screening done and is asking about results. No lab noted for this.  Please advise

## 2021-11-04 NOTE — TELEPHONE ENCOUNTER
Spoke with pt she will come in office today to leave urine. Pt also wants to know if we were able to locate records that were sent to our office. Not noted in media. Only record request noted. Unaware if records were received.

## 2021-11-04 NOTE — TELEPHONE ENCOUNTER
If they are not in media we haven't received them yet as I do not have them on my desk either. Can we call her previous provider to follow-up and get these sent over? Thanks.

## 2021-11-04 NOTE — TELEPHONE ENCOUNTER
It doesn't look like this was sent. IF she is able to come in to our office or to a [facility] lab we resend the gonorhrea, chlamydia and trichomonas testing from her urine. Thanks.

## 2021-11-05 LAB — URINE CULTURE, ROUTINE: NORMAL

## 2021-11-07 LAB
C. TRACHOMATIS DNA ,URINE: NEGATIVE
N. GONORRHOEAE DNA, URINE: NEGATIVE

## 2021-11-16 ENCOUNTER — OFFICE VISIT (OUTPATIENT)
Dept: ORTHOPEDIC SURGERY | Age: 36
End: 2021-11-16
Payer: COMMERCIAL

## 2021-11-16 VITALS — HEIGHT: 60 IN | WEIGHT: 158 LBS | BODY MASS INDEX: 31.02 KG/M2

## 2021-11-16 DIAGNOSIS — S76.311A STRAIN OF RIGHT HAMSTRING, INITIAL ENCOUNTER: ICD-10-CM

## 2021-11-16 DIAGNOSIS — S83.91XA SPRAIN OF RIGHT KNEE, UNSPECIFIED LIGAMENT, INITIAL ENCOUNTER: Primary | ICD-10-CM

## 2021-11-16 PROCEDURE — G8427 DOCREV CUR MEDS BY ELIG CLIN: HCPCS | Performed by: PHYSICIAN ASSISTANT

## 2021-11-16 PROCEDURE — G8417 CALC BMI ABV UP PARAM F/U: HCPCS | Performed by: PHYSICIAN ASSISTANT

## 2021-11-16 PROCEDURE — 99213 OFFICE O/P EST LOW 20 MIN: CPT | Performed by: PHYSICIAN ASSISTANT

## 2021-11-16 PROCEDURE — L1812 KO ELASTIC W/JOINTS PRE OTS: HCPCS | Performed by: PHYSICIAN ASSISTANT

## 2021-11-16 PROCEDURE — 1036F TOBACCO NON-USER: CPT | Performed by: PHYSICIAN ASSISTANT

## 2021-11-16 PROCEDURE — G8484 FLU IMMUNIZE NO ADMIN: HCPCS | Performed by: PHYSICIAN ASSISTANT

## 2021-11-16 NOTE — PROGRESS NOTES
Subjective    Patient ID: Xiomy Walker is a 29 y.o..  female. Chief Complaint   Patient presents with    Leg Pain     right leg pain       Knee Pain  Patient complains of right knee pain. This is evaluated as a personal injury. There was a history of injury. The pain began 3 days ago. The pain is located anterior. She describes the symptoms as aching, sharp and stabbing. Symptoms improve with rest. The symptoms are worse with activity. The knee has not given out or felt unstable. The patient can bend and straighten the knee fully. The patient is active in none. Treatment to date has been ice, without significant relief. Pt states that she got out of a car the other day in a rush and felt like she may have twisted her leg but cannot remember exactly. Has had pain along her lateral thigh and into the knee since then. States that at times pain goes all the way from hip to the foot/ankle area. No prior injuries. No n/t in the leg. No back pain. Is on her feet all day for work and pain is getting worse. Patient's medications, allergies, past medical, surgical, social and family histories were reviewed and updated as appropriate. The pain assessment was noted & is as follows:  Pain Assessment  Location of Pain: Leg  Location Modifiers: Right  Severity of Pain: 10  Quality of Pain: Aching, Sharp  Duration of Pain: Persistent  Frequency of Pain: Constant  Limiting Behavior: Yes  Result of Injury: Yes  Work-Related Injury: No  Are there other pain locations you wish to document?: No    Physical Exam:   Constitutional:  Pt well groomed, no acute distress, well developed, no obvious deformities  Vitals:    11/16/21 1734   Weight: 158 lb (71.7 kg)   Height: 5' (1.524 m)     -Oriented to person, place, and time  -mood and affect are appropriate    Knee exam - right knee exam shows;    -range of motion of R. Knee is full , and L. Knee is full.  The patient does have  pain on motion  -there is an effusion  - there is tenderness over the  anterior region  -there are not any masses  - there is not ligamentous instability  -there is not  deformity noted. - tenderness laxity is not noted with  Valgus stress test.   -  tenderness laxity is not noted with  Varus stress test  -Mc Murrays testing Negative/Negative  -Anterior/posterior drawer testing negative    Pain localized primarily over medial and lateral joint line. Mild effusion noted. No deformity. Able to do SLR. Pain along lateral hamstring specifically in the thigh. No popliteal pain. No ankle or foot pain. Calf soft and nt. Contralateral Exam:  -No obvious deformities  -No abrasions or cellulitis noted, NVI   -Full ROM   -No joint laxity  -no palpable tenderness noted    Neurological exam:   -Deep tendon reflexes are Normal at the ankles with strong extensor hallicus longus motor strength bilaterally. --Distal pulses DP/PT:  present 2+    Skin exam:  There is not any cellulitis, lymphedema or cutaneous lesions noted in the lower extremities. Xray:  No orders to display     3v right knee, AP pelvis  no fracture or dislocation noted, mild knee effusion. Assessment:   right knee sprain with possible small meniscus tear, hamstring strain      Plan:  Rest, ice, compression, and elevation (RICE) therapy. Reduction in offending activity. OTC analgesics as needed. Follow up in 2 weeks. hinged knee brace        Procedures    [employer] Hinged Knee Brace     Patient was prescribed a [employer] Hinged Knee Brace. The right knee will require stabilization / immobilization from this semi-rigid / rigid orthosis to improve their function. The orthosis will assist in protecting the affected area, provide functional support and facilitate healing. The patient was educated and fit by a healthcare professional with expert knowledge and specialization in brace application while under the direct supervision of the treating physician. Verbal and written instructions for the use of and application of this item were provided. They were instructed to contact the office immediately should the brace result in increased pain, decreased sensation, increased swelling or worsening of the condition.

## 2021-11-23 DIAGNOSIS — F41.9 ANXIETY: ICD-10-CM

## 2021-11-23 RX ORDER — ONDANSETRON 4 MG/1
4 TABLET, FILM COATED ORAL EVERY 8 HOURS PRN
Qty: 20 TABLET | Refills: 0 | Status: SHIPPED | OUTPATIENT
Start: 2021-11-23 | End: 2021-12-27

## 2021-12-27 ENCOUNTER — VIRTUAL VISIT (OUTPATIENT)
Dept: FAMILY MEDICINE CLINIC | Age: 36
End: 2021-12-27
Payer: COMMERCIAL

## 2021-12-27 DIAGNOSIS — J40 BRONCHITIS: ICD-10-CM

## 2021-12-27 DIAGNOSIS — J01.90 ACUTE BACTERIAL SINUSITIS: ICD-10-CM

## 2021-12-27 DIAGNOSIS — J01.00 ACUTE NON-RECURRENT MAXILLARY SINUSITIS: ICD-10-CM

## 2021-12-27 DIAGNOSIS — B96.89 ACUTE BACTERIAL SINUSITIS: ICD-10-CM

## 2021-12-27 LAB
INTERNAL QC: NORMAL
SARS-COV-2, NAA: NEGATIVE

## 2021-12-27 PROCEDURE — G8427 DOCREV CUR MEDS BY ELIG CLIN: HCPCS | Performed by: FAMILY MEDICINE

## 2021-12-27 PROCEDURE — 99213 OFFICE O/P EST LOW 20 MIN: CPT | Performed by: FAMILY MEDICINE

## 2021-12-27 RX ORDER — BENZONATATE 200 MG/1
200 CAPSULE ORAL 3 TIMES DAILY PRN
Qty: 30 CAPSULE | Refills: 0 | Status: SHIPPED | OUTPATIENT
Start: 2021-12-27 | End: 2022-01-06

## 2021-12-27 RX ORDER — DOXYCYCLINE HYCLATE 100 MG
100 TABLET ORAL 2 TIMES DAILY
Qty: 20 TABLET | Refills: 0 | Status: SHIPPED | OUTPATIENT
Start: 2021-12-27 | End: 2022-01-06

## 2021-12-27 RX ORDER — ONDANSETRON 4 MG/1
4 TABLET, FILM COATED ORAL EVERY 8 HOURS PRN
Qty: 20 TABLET | Refills: 0 | Status: SHIPPED | OUTPATIENT
Start: 2021-12-27 | End: 2022-03-08 | Stop reason: SDUPTHER

## 2021-12-27 ASSESSMENT — ENCOUNTER SYMPTOMS
COUGH: 1
RHINORRHEA: 1

## 2021-12-27 NOTE — PROGRESS NOTES
2021    TELEHEALTH EVALUATION -- Audio/Visual (During LAOYP-61 public health emergency)    HPI:    Erlinda Barron (:  1985) has requested an audio/video evaluation for the following concern(s):    Cough  This is a new problem. The current episode started in the past 7 days (began on thursday). The problem has been gradually worsening. The cough is productive of sputum. Associated symptoms include ear pain and rhinorrhea. Otalgia   Associated symptoms include coughing and rhinorrhea. rn on Thursday. Sig ST and drainage on Friday. Inc congestion on Saturday. Drainage into chest noted as well. Diff breathing if sitting or lying down. Daughter w/ congestion that lasted approx 1 week. Other daughter tested neg for flu, covid, or strep. Has not had any testing. Dry cough. Clear sputum when clears throat. Ear ache noted. No drainage. Hr in the .  sats 97%    Using tylenol cold and flu, zyrtec, mucinex. No sig relief noted. Review of Systems   HENT: Positive for ear pain and rhinorrhea. Respiratory: Positive for cough. Prior to Visit Medications    Medication Sig Taking?  Authorizing Provider   doxycycline hyclate (VIBRA-TABS) 100 MG tablet Take 1 tablet by mouth 2 times daily for 10 days Yes Jhoan Choi MD   ondansetron (ZOFRAN) 4 MG tablet Take 1 tablet by mouth every 8 hours as needed for Nausea or Vomiting Yes Jhoan Choi MD   benzonatate (TESSALON) 200 MG capsule Take 1 capsule by mouth 3 times daily as needed for Cough Yes Jhoan Choi MD   cetirizine (ZYRTEC) 10 MG tablet Take 10 mg by mouth daily Yes Historical Provider, MD       Social History     Tobacco Use    Smoking status: Never Smoker    Smokeless tobacco: Never Used   Vaping Use    Vaping Use: Never used   Substance Use Topics    Alcohol use: Yes     Comment: rarely    Drug use: No        Allergies   Allergen Reactions    Dilaudid [Hydromorphone Hcl] Other (See Comments) tachycardia    Adhesive Tape     Amoxicillin Hives    Bactrim [Sulfamethoxazole-Trimethoprim] Swelling    Ceclor [Cefaclor]      Rash      Cephalexin      swelling    Clindamycin/Lincomycin      swelling    Phenergan [Promethazine Hcl]      Increased heart rate    Sulfa Antibiotics      swelling    Zithromax [Azithromycin] Other (See Comments)     Stomach pains    Morphine Palpitations   ,   Past Medical History:   Diagnosis Date    Anesthesia complication     decreased B/P     Blood clot in vein     Right ovary    Complication of anesthesia     Low blood pressure    Depression     Endometriosis     Factor V deficiency (HCC)     Heart disease     Hyperlipidemia     no meds    Overactive bladder     SVT [facility]supraventricular tachycardia) ([facility])     Trauma        PHYSICAL EXAMINATION:  [ INSTRUCTIONS:  \"[x]\" Indicates a positive item  \"[]\" Indicates a negative item  -- DELETE ALL ITEMS NOT EXAMINED]  Vital Signs: (As obtained by patient/caregiver or practitioner observation)    Blood pressure-  Heart rate-    Respiratory rate-    Temperature-  Pulse oximetry-     Constitutional: [x] Appears well-developed and well-nourished [x] No apparent distress      [] Abnormal-does not appear to feel well     mental status  [x] Alert and awake  [x] Oriented to person/place/time []Able to follow commands      Eyes:  EOM    [x]  Normal  [] Abnormal-  Sclera  []  Normal  [] Abnormal -         Discharge []  None visible  [] Abnormal -    HENT:   [x] Normocephalic, atraumatic.   [] Abnormal   [] Mouth/Throat: Mucous membranes are moist.     External Ears [] Normal  [] Abnormal-     Neck: [] No visualized mass     Pulmonary/Chest: [x] Respiratory effort normal.  [x] No visualized signs of difficulty breathing or respiratory distress        [] Abnormal-      Musculoskeletal:   [] Normal gait with no signs of ataxia         [] Normal range of motion of neck        [] Abnormal-       Neurological:        [x] No Facial Asymmetry (Cranial nerve 7 motor function) (limited exam to video visit)          [] No gaze palsy        [] Abnormal-         Skin:        [] No significant exanthematous lesions or discoloration noted on facial skin         [] Abnormal-            Psychiatric:       [x] Normal Affect [x] No Hallucinations        [] Abnormal-     Other pertinent observable physical exam findings-     ASSESSMENT/PLAN:  1. Bronchitis  Symptoms similar to prior episodes with sinus and bronchitis. That being said, given potential for exposure, recommend Covid testing to evaluate further. Given her history of tachycardia and factor V Leiden, may benefit from monoclonal antibodies. She also is in the overweight category. Patient will wait to fill the antibiotics until results of her test is available. We also discussed symptomatic treatment with Flonase  - doxycycline hyclate (VIBRA-TABS) 100 MG tablet; Take 1 tablet by mouth 2 times daily for 10 days  Dispense: 20 tablet; Refill: 0  - ondansetron (ZOFRAN) 4 MG tablet; Take 1 tablet by mouth every 8 hours as needed for Nausea or Vomiting  Dispense: 20 tablet; Refill: 0  - COVID-19; Future    2. Acute bacterial sinusitis  See above  - doxycycline hyclate (VIBRA-TABS) 100 MG tablet; Take 1 tablet by mouth 2 times daily for 10 days  Dispense: 20 tablet; Refill: 0  - ondansetron (ZOFRAN) 4 MG tablet; Take 1 tablet by mouth every 8 hours as needed for Nausea or Vomiting  Dispense: 20 tablet; Refill: 0    3. Acute non-recurrent maxillary sinusitis  See above  - benzonatate (TESSALON) 200 MG capsule; Take 1 capsule by mouth 3 times daily as needed for Cough  Dispense: 30 capsule; Refill: 0      No follow-ups on file. Priscilla Verma, was evaluated through a synchronous (real-time) audio-video encounter. The patient (or guardian if applicable) is aware that this is a billable service. Verbal consent to proceed has been obtained within the past 12 months.  The visit was conducted pursuant to the emergency declaration under the [facility], [facility] authority and the [email or web address] and [employer] General Act. Patient identification was verified, and a caregiver was present when appropriate. The patient was located in a state where the provider was credentialed to provide care. Total time spent on this encounter: Not billed by time    --Rodríguez Coker MD on 12/27/2021 at 3:12 PM    An electronic signature was used to authenticate this note.

## 2022-01-06 ENCOUNTER — OFFICE VISIT (OUTPATIENT)
Dept: FAMILY MEDICINE CLINIC | Age: 37
End: 2022-01-06
Payer: COMMERCIAL

## 2022-01-06 VITALS
DIASTOLIC BLOOD PRESSURE: 90 MMHG | OXYGEN SATURATION: 97 % | BODY MASS INDEX: 29.33 KG/M2 | WEIGHT: 150.2 LBS | SYSTOLIC BLOOD PRESSURE: 124 MMHG | HEART RATE: 82 BPM

## 2022-01-06 DIAGNOSIS — K76.9 LIVER LESION: ICD-10-CM

## 2022-01-06 DIAGNOSIS — R53.83 FATIGUE, UNSPECIFIED TYPE: ICD-10-CM

## 2022-01-06 DIAGNOSIS — R11.0 NAUSEA: ICD-10-CM

## 2022-01-06 DIAGNOSIS — R10.9 FLANK PAIN: ICD-10-CM

## 2022-01-06 DIAGNOSIS — R63.4 WEIGHT LOSS, UNINTENTIONAL: Primary | ICD-10-CM

## 2022-01-06 DIAGNOSIS — R10.30 LOWER ABDOMINAL PAIN: ICD-10-CM

## 2022-01-06 DIAGNOSIS — R00.2 PALPITATION: ICD-10-CM

## 2022-01-06 LAB
BILIRUBIN, POC: NEGATIVE
BLOOD URINE, POC: NEGATIVE
CLARITY, POC: CLEAR
COLOR, POC: YELLOW
GLUCOSE URINE, POC: NEGATIVE
KETONES, POC: NEGATIVE
LEUKOCYTE EST, POC: NEGATIVE
NITRITE, POC: NEGATIVE
PH, POC: 5.5
PROTEIN, POC: NEGATIVE
SPECIFIC GRAVITY, POC: 1.02
UROBILINOGEN, POC: 0.2

## 2022-01-06 PROCEDURE — 99214 OFFICE O/P EST MOD 30 MIN: CPT

## 2022-01-06 PROCEDURE — 1036F TOBACCO NON-USER: CPT

## 2022-01-06 PROCEDURE — G8417 CALC BMI ABV UP PARAM F/U: HCPCS

## 2022-01-06 PROCEDURE — 81002 URINALYSIS NONAUTO W/O SCOPE: CPT

## 2022-01-06 PROCEDURE — G8484 FLU IMMUNIZE NO ADMIN: HCPCS

## 2022-01-06 PROCEDURE — G8427 DOCREV CUR MEDS BY ELIG CLIN: HCPCS

## 2022-01-06 PROCEDURE — 93000 ELECTROCARDIOGRAM COMPLETE: CPT

## 2022-01-06 ASSESSMENT — ENCOUNTER SYMPTOMS
DIARRHEA: 0
RHINORRHEA: 0
EYE PAIN: 0
CHOKING: 0
WHEEZING: 0
SHORTNESS OF BREATH: 0
ABDOMINAL DISTENTION: 0
CHEST TIGHTNESS: 0
ABDOMINAL PAIN: 1
CONSTIPATION: 0
COLOR CHANGE: 0
SORE THROAT: 0
EYE DISCHARGE: 0
NAUSEA: 1
TROUBLE SWALLOWING: 0
COUGH: 0

## 2022-01-06 NOTE — PROGRESS NOTES
Chief Complaint   Patient presents with    Fatigue     weakness, 15pound weight loss in 6 days        HPI:  Naeem Mojica is a 29 y.o. (: 1985) here today   for evaluation of fatigue, weakness, 15 pound weight loss 6 days. Has had nausea but not vomiting. Is having to force herself to eat. Has had a loss of appetite. Has had increase in palpitations. Has hx of cardiac ablation. Had abd pain Monday that was very low abd that lasted most all day. Pain also was noticed in pain in her flank area right side. Took tylenol and zofran. Pain was very sharp and she did not want to move very much. Has a BM every other day but has had no change in BM habit. Patient's medications, allergies, past medical, surgical, social and family histories were reviewed and updated asappropriate. ROS:  Review of Systems   Constitutional: Positive for fatigue and unexpected weight change. Negative for activity change, appetite change, chills and fever. HENT: Negative for rhinorrhea, sore throat and trouble swallowing. Eyes: Negative for pain, discharge and visual disturbance. Respiratory: Negative for cough, choking, chest tightness, shortness of breath and wheezing. Cardiovascular: Positive for chest pain and palpitations. Negative for leg swelling. Gastrointestinal: Positive for abdominal pain and nausea. Negative for abdominal distention, constipation and diarrhea. Endocrine: Negative for cold intolerance and heat intolerance. Genitourinary: Positive for flank pain and pelvic pain. Negative for difficulty urinating, frequency and urgency. Musculoskeletal: Negative for gait problem and neck stiffness. Skin: Negative for color change and rash. Neurological: Positive for dizziness and light-headedness. Negative for weakness and headaches. Psychiatric/Behavioral: Negative for dysphoric mood and sleep disturbance. The patient is nervous/anxious.             Prior to Visit Medications sounds. No wheezing. Abdominal:      General: Bowel sounds are normal.      Palpations: Abdomen is soft. Tenderness: There is no abdominal tenderness. Musculoskeletal:         General: Normal range of motion. Cervical back: Normal range of motion and neck supple. Right lower leg: No edema. Left lower leg: No edema. Skin:     General: Skin is warm and dry. Capillary Refill: Capillary refill takes less than 2 seconds. Findings: No rash. Neurological:      General: No focal deficit present. Mental Status: She is alert and oriented to person, place, and time. Motor: No weakness. Psychiatric:         Mood and Affect: Mood normal.         Behavior: Behavior normal.           ASSESSMENT/PLAN:    1. Weight loss, unintentional  Patient presents today for multiple complaints. Patient states in last 6 days she has lost 15 pounds unintentionally. She has had lower abdominal pain that is fairly sharp, flank pain on the right side, nausea, extreme fatigue, palpitations. Patient has a history of cardiac ablation and is having some palpitations so perform EKG today. EKG was normal sinus rhythm. Urinalysis was performed to assess for possible UTI given abdominal and flank pain discomfort. There is no noted abnormality therefore UTI is ruled out. Several lab tests ordered, see below to help evaluate patient's complaints and symptoms. I will follow-up with the patient these results are sent to my in basket. Patient also stated she needed a referral for new gastroenterologist.  She is a history of liver lesions that need to be followed up on yearly based on her previous gastroenterologist recommendation. Referral placed for  at [address]  - T4, FREE  - TSH without Reflex    2. Lower abdominal pain    - AMYLASE  - LIPASE  - POCT Urinalysis no Micro    3.  Flank pain    - POCT Urinalysis

## 2022-01-07 LAB
A/G RATIO: 1.7 (ref 1.1–2.2)
ALBUMIN SERPL-MCNC: 4.8 G/DL (ref 3.4–5)
ALP BLD-CCNC: 89 U/L (ref 40–129)
ALT SERPL-CCNC: 11 U/L (ref 10–40)
AMYLASE: 71 U/L (ref 25–115)
ANION GAP SERPL CALCULATED.3IONS-SCNC: 14 MMOL/L (ref 3–16)
AST SERPL-CCNC: 11 U/L (ref 15–37)
BASOPHILS ABSOLUTE: 0.1 K/UL (ref 0–0.2)
BASOPHILS RELATIVE PERCENT: 0.9 %
BILIRUB SERPL-MCNC: 0.3 MG/DL (ref 0–1)
BUN BLDV-MCNC: 11 MG/DL (ref 7–20)
CALCIUM SERPL-MCNC: 9.7 MG/DL (ref 8.3–10.6)
CHLORIDE BLD-SCNC: 98 MMOL/L (ref 99–110)
CO2: 26 MMOL/L (ref 21–32)
CREAT SERPL-MCNC: 0.6 MG/DL (ref 0.6–1.1)
EOSINOPHILS ABSOLUTE: 0.1 K/UL (ref 0–0.6)
EOSINOPHILS RELATIVE PERCENT: 1.4 %
GFR AFRICAN AMERICAN: >60
GFR NON-AFRICAN AMERICAN: >60
GLUCOSE BLD-MCNC: 93 MG/DL (ref 70–99)
HCT VFR BLD CALC: 42.6 % (ref 36–48)
HEMOGLOBIN: 14.7 G/DL (ref 12–16)
LIPASE: 39 U/L (ref 13–60)
LYMPHOCYTES ABSOLUTE: 2.2 K/UL (ref 1–5.1)
LYMPHOCYTES RELATIVE PERCENT: 23 %
MCH RBC QN AUTO: 29.9 PG (ref 26–34)
MCHC RBC AUTO-ENTMCNC: 34.6 G/DL (ref 31–36)
MCV RBC AUTO: 86.4 FL (ref 80–100)
MONOCYTES ABSOLUTE: 0.5 K/UL (ref 0–1.3)
MONOCYTES RELATIVE PERCENT: 5.4 %
NEUTROPHILS ABSOLUTE: 6.8 K/UL (ref 1.7–7.7)
NEUTROPHILS RELATIVE PERCENT: 69.3 %
PDW BLD-RTO: 12.5 % (ref 12.4–15.4)
PLATELET # BLD: 268 K/UL (ref 135–450)
PMV BLD AUTO: 9.1 FL (ref 5–10.5)
POTASSIUM SERPL-SCNC: 4 MMOL/L (ref 3.5–5.1)
RBC # BLD: 4.93 M/UL (ref 4–5.2)
SODIUM BLD-SCNC: 138 MMOL/L (ref 136–145)
T4 FREE: 1.3 NG/DL (ref 0.9–1.8)
TOTAL PROTEIN: 7.7 G/DL (ref 6.4–8.2)
TSH SERPL DL<=0.05 MIU/L-ACNC: 1.49 UIU/ML (ref 0.27–4.2)
VITAMIN B-12: 555 PG/ML (ref 211–911)
WBC # BLD: 9.8 K/UL (ref 4–11)

## 2022-01-19 ENCOUNTER — OFFICE VISIT (OUTPATIENT)
Dept: FAMILY MEDICINE CLINIC | Age: 37
End: 2022-01-19
Payer: COMMERCIAL

## 2022-01-19 VITALS
BODY MASS INDEX: 28.9 KG/M2 | DIASTOLIC BLOOD PRESSURE: 82 MMHG | SYSTOLIC BLOOD PRESSURE: 110 MMHG | OXYGEN SATURATION: 98 % | HEART RATE: 74 BPM | HEIGHT: 60 IN | WEIGHT: 147.2 LBS

## 2022-01-19 DIAGNOSIS — K64.9 HEMORRHOIDS, UNSPECIFIED HEMORRHOID TYPE: ICD-10-CM

## 2022-01-19 DIAGNOSIS — R63.4 UNEXPLAINED WEIGHT LOSS: ICD-10-CM

## 2022-01-19 DIAGNOSIS — R10.11 RUQ PAIN: Primary | ICD-10-CM

## 2022-01-19 PROCEDURE — G8427 DOCREV CUR MEDS BY ELIG CLIN: HCPCS | Performed by: FAMILY MEDICINE

## 2022-01-19 PROCEDURE — G8484 FLU IMMUNIZE NO ADMIN: HCPCS | Performed by: FAMILY MEDICINE

## 2022-01-19 PROCEDURE — G8417 CALC BMI ABV UP PARAM F/U: HCPCS | Performed by: FAMILY MEDICINE

## 2022-01-19 PROCEDURE — 99213 OFFICE O/P EST LOW 20 MIN: CPT | Performed by: FAMILY MEDICINE

## 2022-01-19 PROCEDURE — 1036F TOBACCO NON-USER: CPT | Performed by: FAMILY MEDICINE

## 2022-01-19 RX ORDER — HYDROCORTISONE ACETATE 25 MG/1
25 SUPPOSITORY RECTAL EVERY 12 HOURS
Qty: 20 SUPPOSITORY | Refills: 0 | Status: SHIPPED | OUTPATIENT
Start: 2022-01-19 | End: 2022-03-07

## 2022-01-19 ASSESSMENT — ENCOUNTER SYMPTOMS
ABDOMINAL PAIN: 1
DIARRHEA: 1

## 2022-01-19 NOTE — PROGRESS NOTES
Chief Complaint   Patient presents with    Abdominal Pain    Weight Loss       HPI:  Vinita Pimentel is a 29 y.o. (: 1985) here today   for follow up on abdominal pain and weight loss. HPI  Still w/ sig abd pain. Began w/ diarrhea on Friday. Caused hemorrhoids. No appetite. Some nausea noted. Was given referral for GI. Has not seen yet. Cont to have sig weight loss and abd pain. Had been seen by GI in the past.  Prior GI left. Had prior u/s and mri of abdomen. HIDA scan had previously been recommended, but never done. Ongoing sxs. Patient's medications, allergies, past medical, surgical, social and family histories were reviewed and updated as appropriate. ROS:  Review of Systems   Constitutional: Positive for appetite change and unexpected weight change. Gastrointestinal: Positive for abdominal pain and diarrhea.            Microscopic Examination (no units)   Date Value   2021 YES     LDL Calculated (mg/dL)   Date Value   2020 157 (H)       Past Medical History:   Diagnosis Date    Anesthesia complication     decreased B/P     Blood clot in vein     Right ovary    Complication of anesthesia     Low blood pressure    Depression     Endometriosis     Factor V deficiency (HCC)     Heart disease     Hyperlipidemia     no meds    Overactive bladder     SVT (supraventricular tachycardia) (HCC)     Trauma        Family History   Problem Relation Age of Onset    High Blood Pressure Mother     Cancer Father        Social History     Socioeconomic History    Marital status: Legally      Spouse name: Not on file    Number of children: Not on file    Years of education: Not on file    Highest education level: Not on file   Occupational History    Not on file   Tobacco Use    Smoking status: Never Smoker    Smokeless tobacco: Never Used   Vaping Use    Vaping Use: Never used   Substance and Sexual Activity    Alcohol use: Yes     Comment: rarely    Drug use: No    Sexual activity: Yes   Other Topics Concern    Not on file   Social History Narrative    Not on file     Social Determinants of Health     Financial Resource Strain:     Difficulty of Paying Living Expenses: Not on file   Food Insecurity:     Worried About Running Out of Food in the Last Year: Not on file    Clara of Food in the Last Year: Not on file   Transportation Needs:     Lack of Transportation (Medical): Not on file    Lack of Transportation (Non-Medical): Not on file   Physical Activity:     Days of Exercise per Week: Not on file    Minutes of Exercise per Session: Not on file   Stress:     Feeling of Stress : Not on file   Social Connections:     Frequency of Communication with Friends and Family: Not on file    Frequency of Social Gatherings with Friends and Family: Not on file    Attends Church Services: Not on file    Active Member of [employer] or Organizations: Not on file    Attends Club or Organization Meetings: Not on file    Marital Status: Not on file   Intimate Partner Violence:     Fear of Current or Ex-Partner: Not on file    Emotionally Abused: Not on file    Physically Abused: Not on file    Sexually Abused: Not on file   Housing Stability:     Unable to Pay for Housing in the Last Year: Not on file    Number of [place] in the Last Year: Not on file    Unstable Housing in the Last Year: Not on file       Prior to Visit Medications    Medication Sig Taking?  Authorizing Provider   hydrocortisone (ANUSOL-HC) 25 MG suppository Place 1 suppository rectally every 12 hours Yes Chad Lowery MD   ondansetron (ZOFRAN) 4 MG tablet Take 1 tablet by mouth every 8 hours as needed for Nausea or Vomiting Yes Chad Lowery MD   cetirizine (ZYRTEC) 10 MG tablet Take 10 mg by mouth daily Yes Historical Provider, MD       Allergies   Allergen Reactions    Dilaudid [Hydromorphone Hcl] Other (See Comments)     tachycardia    Adhesive Tape     Amoxicillin Hives    Bactrim [Sulfamethoxazole-Trimethoprim] Swelling    Ceclor [Cefaclor]      Rash      Cephalexin      swelling    Clindamycin/Lincomycin      swelling    Phenergan [Promethazine Hcl]      Increased heart rate    Sulfa Antibiotics      swelling    Zithromax [Azithromycin] Other (See Comments)     Stomach pains    Morphine Palpitations       OBJECTIVE:    /82   Pulse 74   Ht 5' (1.524 m)   Wt 147 lb 3.2 oz (66.8 kg)   LMP 02/01/2018   SpO2 98%   BMI 28.75 kg/m²     BP Readings from Last 2 Encounters:   01/19/22 110/82   01/06/22 (!) 124/90       Wt Readings from Last 3 Encounters:   01/19/22 147 lb 3.2 oz (66.8 kg)   01/06/22 150 lb 3.2 oz (68.1 kg)   11/16/21 158 lb (71.7 kg)       Physical Exam  Constitutional:       Appearance: Normal appearance. HENT:      Head: Normocephalic and atraumatic. Eyes:      Extraocular Movements: Extraocular movements intact. Cardiovascular:      Rate and Rhythm: Normal rate and regular rhythm. Pulmonary:      Effort: Pulmonary effort is normal.      Breath sounds: Normal breath sounds. Abdominal:      Palpations: Abdomen is soft. Tenderness: There is generalized abdominal tenderness and tenderness in the right upper quadrant. Comments: Generalized abd pain. Most severe right upper quadrant. Neurological:      Mental Status: She is alert. ASSESSMENT/PLAN:    1. RUQ pain  Ongoing sig issues w/ pain, char ruq. Reviewed prior GI note. Had prior u/s and MRI. Prior gi had recommended HIDA scan but not done. rec more urgent GI referral and proceed w/ HIDA scan to eval further. Reviewed recent labs. normal  - NM HEPATOBILIARY SCAN W EJECTION FRACTION; Future  - [facility] - Yang Taylor MD, Gastroenterology, [facility]    2. Hemorrhoids, unspecified hemorrhoid type  From recent diarrhea. Add med as below. - hydrocortisone (ANUSOL-HC) 25 MG suppository; Place 1 suppository rectally every 12 hours  Dispense: 20 suppository;  Refill: 0    3. Unexplained weight loss  See above. Suspect related to dec appetite and nausea. - NM HEPATOBILIARY SCAN W EJECTION FRACTION;  Future  - AFL - Anna Rodrigez MD, Gastroenterology, [facility]

## 2022-03-03 ENCOUNTER — HOSPITAL ENCOUNTER (OUTPATIENT)
Age: 37
Setting detail: OBSERVATION
LOS: 1 days | Discharge: HOME | End: 2022-03-04
Attending: INTERNAL MEDICINE | Admitting: INTERNAL MEDICINE
Payer: COMMERCIAL

## 2022-03-03 PROBLEM — K59.00 CONSTIPATION: Status: ACTIVE | Noted: 2022-03-03

## 2022-03-03 PROCEDURE — 6370000000 HC RX 637 (ALT 250 FOR IP): Performed by: NURSE PRACTITIONER

## 2022-03-03 PROCEDURE — 6360000002 HC RX W HCPCS: Performed by: NURSE PRACTITIONER

## 2022-03-03 PROCEDURE — G0378 HOSPITAL OBSERVATION PER HR: HCPCS

## 2022-03-03 PROCEDURE — 96374 THER/PROPH/DIAG INJ IV PUSH: CPT

## 2022-03-03 PROCEDURE — 6370000000 HC RX 637 (ALT 250 FOR IP): Performed by: REGISTERED NURSE

## 2022-03-03 RX ORDER — ONDANSETRON 2 MG/ML
4 INJECTION INTRAMUSCULAR; INTRAVENOUS EVERY 6 HOURS PRN
Status: DISCONTINUED | OUTPATIENT
Start: 2022-03-03 | End: 2022-03-04 | Stop reason: HOSPADM

## 2022-03-03 RX ORDER — SODIUM CHLORIDE 0.9 % (FLUSH) 0.9 %
5-40 SYRINGE (ML) INJECTION EVERY 12 HOURS SCHEDULED
Status: DISCONTINUED | OUTPATIENT
Start: 2022-03-03 | End: 2022-03-04 | Stop reason: HOSPADM

## 2022-03-03 RX ORDER — ACETAMINOPHEN 325 MG/1
650 TABLET ORAL EVERY 6 HOURS PRN
Status: DISCONTINUED | OUTPATIENT
Start: 2022-03-03 | End: 2022-03-04 | Stop reason: HOSPADM

## 2022-03-03 RX ORDER — SODIUM CHLORIDE 9 MG/ML
25 INJECTION, SOLUTION INTRAVENOUS PRN
Status: DISCONTINUED | OUTPATIENT
Start: 2022-03-03 | End: 2022-03-04 | Stop reason: HOSPADM

## 2022-03-03 RX ORDER — ACETAMINOPHEN 650 MG/1
650 SUPPOSITORY RECTAL EVERY 6 HOURS PRN
Status: DISCONTINUED | OUTPATIENT
Start: 2022-03-03 | End: 2022-03-04 | Stop reason: HOSPADM

## 2022-03-03 RX ORDER — KETOROLAC TROMETHAMINE 30 MG/ML
30 INJECTION, SOLUTION INTRAMUSCULAR; INTRAVENOUS ONCE
Status: COMPLETED | OUTPATIENT
Start: 2022-03-03 | End: 2022-03-03

## 2022-03-03 RX ORDER — POLYETHYLENE GLYCOL 3350 17 G/17G
17 POWDER, FOR SOLUTION ORAL DAILY PRN
Status: DISCONTINUED | OUTPATIENT
Start: 2022-03-03 | End: 2022-03-04 | Stop reason: HOSPADM

## 2022-03-03 RX ORDER — ONDANSETRON 4 MG/1
4 TABLET, ORALLY DISINTEGRATING ORAL EVERY 8 HOURS PRN
Status: DISCONTINUED | OUTPATIENT
Start: 2022-03-03 | End: 2022-03-04 | Stop reason: HOSPADM

## 2022-03-03 RX ORDER — SODIUM CHLORIDE 0.9 % (FLUSH) 0.9 %
5-40 SYRINGE (ML) INJECTION PRN
Status: DISCONTINUED | OUTPATIENT
Start: 2022-03-03 | End: 2022-03-04 | Stop reason: HOSPADM

## 2022-03-03 RX ADMIN — ONDANSETRON 4 MG: 4 TABLET, ORALLY DISINTEGRATING ORAL at 18:44

## 2022-03-03 RX ADMIN — KETOROLAC TROMETHAMINE 30 MG: 30 INJECTION, SOLUTION INTRAMUSCULAR at 18:44

## 2022-03-03 RX ADMIN — POLYETHYLENE GLYCOL 3350, SODIUM SULFATE ANHYDROUS, SODIUM BICARBONATE, SODIUM CHLORIDE, POTASSIUM CHLORIDE 2000 ML: 236; 22.74; 6.74; 5.86; 2.97 POWDER, FOR SOLUTION ORAL at 15:26

## 2022-03-03 ASSESSMENT — PAIN DESCRIPTION - LOCATION: LOCATION: ABDOMEN

## 2022-03-03 ASSESSMENT — PAIN SCALES - GENERAL
PAINLEVEL_OUTOF10: 0
PAINLEVEL_OUTOF10: 7
PAINLEVEL_OUTOF10: 4
PAINLEVEL_OUTOF10: 5

## 2022-03-03 ASSESSMENT — PAIN DESCRIPTION - ORIENTATION: ORIENTATION: RIGHT

## 2022-03-03 ASSESSMENT — PAIN DESCRIPTION - PAIN TYPE: TYPE: ACUTE PAIN

## 2022-03-03 NOTE — PROGRESS NOTES
Tap water enema administered to pt, approx 100ml. Pt was unable to tolerate enema and immediately went to rest room.

## 2022-03-03 NOTE — CONSULTS
Consultation Note    Patient Name: Maria Elena Bowens  : 1985  Age: 29 y.o. Admitting Physician: Ana Niño MD   Date of Admission: 3/3/2022 11:52 AM   Primary Care Physician: Jason Chandler MD        Maria Elena Bowens is being seen at the request of Ana Niño MD for abdominal pain. History of Present Illness:  Pleasant 30 yo F established with Dr. Adama Harden in OP Setting, last seen 2022 for abdominal pain. Presents from [facility] with abdominal pain, nausea/vomiting. She reports family with recent GI Virus. Reports she started with abdominal pain, nausea/vomiting yesterday. Initially reported abdominal pain followed by N/V in the evening. Pain noted to lower abdominal region with associated rectal pressure/discomfort. Last reported BM approximately 2-4 days ago. Denies any hematemesis, hematochezia, melena, fever/chills, anorexia or weight loss. Nothing makes pain worse or better. Average BM approximately every 2-3 days, reports not taking anything OTC to help with BM's. On admission, CT AP from [address] [place] [address] Avenue:       1. Distended stool-filled sigmoid colon and rectum, correlate clinically for   constipation and fecal impaction. Renal Function within normal range. WBC 19.3, Hgb 13.8, Platelet count 184. Denies any burning/frequency with urination. PMHx significant for Factor V deficiency, HLD, Depression, SVT. Abdominal surgeries include BONNIE, Cardiac Ablasion, C Section x 3. GI History:  · 12/15/2020 MRI abdomen W/WO:      CONCLUSION:   Normal MRI the abdomen.  No focal liver lesions. · 2020 US Liver:   Hypoechoic nodule seen in the gallbladder fossa of uncertain significance.    This was not clearly present previously.  This may simply represent   asymmetric fatty infiltration.  Consider pre and post-contrast abdominal MRI,   liver protocol for further characterization.       No change in hypoechoic nodule in the region of the rectal bleeding, fever/chills or other complaints. WBC 19.3 on admission. Suspect Acute constipation for source of abdominal pain/rectal discomfort. Without Fever/chills, like no need for Antibiotic therapy. Plan:  1. SSE to help to cleanse patient from below  2. Clear liquid diet, if tolerates will start Bowel cleanse from above. 3. Patient not interested in Endoscopic evaluation at present time. 4. Ongoing supportive care. 5. Further recommendations per GI attending Dr. Ton Yusuf. Updates to patient, Inder Reynoso and Nursing team.       Hany Estrada, AIDAN - CITLALI Kim    [phone].  Also available via [employer]

## 2022-03-03 NOTE — H&P
Hospital Medicine History & Physical      PCP: Juju Madden MD    Date of Admission: 3/3/2022    Date of Service: Pt seen/examined on 3/3/2022 and placed in observation. Chief Complaint:  Constipation/Abd pain      History Of Present Illness:   29 y.o. female with a PMH of Factor V def, SVT, and Depression who presented to [facility] with complaint of abdominal pain, nausea and constipation. Patient was transferred from [facility] for GI evaluation. Patient states she developed sudden onset of low abd pain, nausea and vomiting last evening. She did report that her daughter had N/V about 1 wk ago. She was evaluated by GI in Jan '22 for RUQ pain and hemorrhoids. She has a HIDA scan planned. CT scan shows constipation, WBC 19k, Received a dose of Cipro IV, Toradol and IVF. Past Medical History:          Diagnosis Date    Anesthesia complication     decreased B/P     Blood clot in vein     Right ovary    Complication of anesthesia     Low blood pressure    Depression     Endometriosis     Factor V deficiency (HCC)     Heart disease     Hyperlipidemia     no meds    Overactive bladder     SVT (supraventricular tachycardia) (HCC)     Trauma        Past Surgical History:          Procedure Laterality Date    ATRIAL ABLATION SURGERY  06/13/2019    SVT, Dr. Hanh Cormier      X 3    DILATION AND CURETTAGE      DILATION AND CURETTAGE OF UTERUS      HYSTERECTOMY, TOTAL ABDOMINAL      TONSILLECTOMY         Medications Prior to Admission:      Prior to Admission medications    Medication Sig Start Date End Date Taking?  Authorizing Provider   ondansetron (ZOFRAN) 4 MG tablet Take 1 tablet by mouth every 8 hours as needed for Nausea or Vomiting 12/27/21  Yes Juju Madden MD   cetirizine (ZYRTEC) 10 MG tablet Take 10 mg by mouth daily   Yes Historical Provider, MD   hydrocortisone (ANUSOL-HC) 25 MG suppository Place 1 suppository rectally every 12 hours 1/19/22 Zenia Cummins MD       Allergies:  Dilaudid [hydromorphone hcl], Amoxicillin, Bactrim [sulfamethoxazole-trimethoprim], Ceclor [cefaclor], Cephalexin, Clindamycin/lincomycin, Phenergan [promethazine hcl], Sulfa antibiotics, Zithromax [azithromycin], Adhesive tape, and Morphine    Social History:      The patient currently lives at home    TOBACCO:   reports that she has never smoked. She has never used smokeless tobacco.  ETOH:   reports current alcohol use. E-Cigarettes/Vaping Use     Questions Responses    E-Cigarette/Vaping Use Never User    Start Date     Passive Exposure     Quit Date     Counseling Given     Comments Unknown            Family History:      Reviewed in detail and negative for DM, CAD, Cancer, CVA. Positive as follows:        Problem Relation Age of Onset    High Blood Pressure Mother     Cancer Father        REVIEW OF SYSTEMS COMPLETED:   Pertinent positives as noted in the HPI. All other systems reviewed and negative. PHYSICAL EXAM PERFORMED:    /82   Pulse 78   Temp 98.1 °F (36.7 °C) (Oral)   Resp 16   Ht 5' (1.524 m)   Wt 140 lb (63.5 kg)   LMP 02/01/2018   SpO2 98%   BMI 27.34 kg/m²     General appearance:  No apparent distress, appears stated age and cooperative. HEENT:  Normal cephalic, atraumatic without obvious deformity. Pupils equal, round, and reactive to light. Extra ocular muscles intact. Conjunctivae/corneas clear. Neck: Supple, with full range of motion. No jugular venous distention. Trachea midline. Respiratory:  Normal respiratory effort. Clear to auscultation, bilaterally without Rales/Wheezes/Rhonchi. Cardiovascular:  Regular rate and rhythm with normal S1/S2 without murmurs, rubs or gallops. Abdomen: Soft, non-tender, non-distended with normal bowel sounds. Musculoskeletal:  No clubbing, cyanosis or edema bilaterally. Full range of motion without deformity. Skin: Skin color, texture, turgor normal.  No rashes or lesions.   Neurologic: Neurovascularly intact without any focal sensory/motor deficits. Cranial nerves: II-XII intact, grossly non-focal.  Psychiatric:  Alert and oriented, thought content appropriate, normal insight  Capillary Refill: Brisk,3 seconds, normal  Peripheral Pulses: +2 palpable, equal bilaterally       Labs:     Recent Labs     03/03/22 0008   WBC 19.3*   HGB 13.8   HCT 40.3     NORMAL     Recent Labs     03/03/22 0008      K 4.1      CO2 24   BUN 11   CREATININE 0.5*   CALCIUM 9.8     Recent Labs     03/03/22 0008   AST 28   ALT 19   BILITOT 0.7   ALKPHOS 76     Recent Labs     03/03/22 0008   INR 1.02*     No results for input(s): Nadia Peek in the last 72 hours. Urinalysis:      Lab Results   Component Value Date    NITRU Negative 11/04/2021    WBCUA 4 11/04/2021    BACTERIA 1+ 11/04/2021    RBCUA 1 11/04/2021    BLOODU Negative 01/06/2022    BLOODU Negative 11/04/2021    SPECGRAV 1.025 01/06/2022    SPECGRAV 1.009 11/04/2021    GLUCOSEU Negative 01/06/2022    GLUCOSEU Negative 11/04/2021         No orders to display       ASSESSMENT:    Active Hospital Problems    Diagnosis Date Noted    Constipation [K59.00] 03/03/2022         PLAN:    Abdominal pain/Constipation  -GI consulted  -CT abd/pelvis constipation/fecal impaction  -FOBT neg  -Covid neg  -Zofran  -CLD  -Golytely   -Toradol prn    DVT Prophylaxis: Lovenox  Diet: ADULT DIET; Clear Liquid  Code Status: Full Code    PT/OT Eval Status: Ambulatory    Dispo - pending course, 1-2 days       Aster Rogel, APRN - CNP    Thank you Jason Chandler MD for the opportunity to be involved in this patient's care. If you have any questions or concerns please feel free to contact me at [phone].

## 2022-03-03 NOTE — PROGRESS NOTES
4 Eyes Skin Assessment     The patient is being assess for   Admission    I agree that 2 RN's have performed a thorough Head to Toe Skin Assessment on the patient. ALL assessment sites listed below have been assessed. Areas assessed for pressure by both nurses:   [x]   Head, Face, and Ears   [x]   Shoulders, Back, and Chest, Abdomen  [x]   Arms, Elbows, and Hands   [x]   Coccyx, Sacrum, and Ischium  [x]   Legs, Feet, and Heels        Skin Assessed Under all Medical Devices by both nurses:  N/A              All Mepilex Borders were peeled back and area peeked at by both nurses:  No: N/A  Please list where Mepilex Borders are located:  N/A             **SHARE this note so that the co-signing nurse is able to place an eSignature**    Co-signer eSignature: Electronically signed by Santi Valadez RN on 3/3/22 at 5:43 PM EST    Does the Patient have Skin Breakdown related to pressure?   No     (Insert Photo here)         Leo Prevention initiated:  NA   Wound Care Orders initiated:  NA      [facility] nurse consulted for Pressure Injury (Stage 3,4, Unstageable, DTI, NWPT, Complex wounds)and New or Established Ostomies:  NA      Primary Nurse eSignature: Electronically signed by Antonio Alejandro RN on 3/3/22 at 3:49 PM EST

## 2022-03-03 NOTE — PLAN OF CARE
Received floorpage for admission orders on direct admit from [employer].   Sent to Cody Rodriguez admitting hospitalist NP.

## 2022-03-03 NOTE — PROGRESS NOTES
Secure Message sent to Dr. Bear Never:    pt is here for constipation. Singer ordered Toradol for pain, and she has Zofran ordered. pt does not want either via IV - she wants them to be given IM. can you please change both orders to IM?  thanks

## 2022-03-03 NOTE — PROGRESS NOTES
Secure Message sent to CINDY Singer:    pt was given a dose of Toradol at [facility]. she wanted to know if she could get another dose while here for her abdominal pain/pressure.   thanks

## 2022-03-03 NOTE — PROGRESS NOTES
Pt is a direct admit from [facility] that arrived via transport. VSS. Call light and bedside table within reach.

## 2022-03-03 NOTE — LETTER
[facility] - Med Surg/Ortho  [address] [address]  Phone: [phone]        March 4, 2022     Patient: Kit Bailey   YOB: 1985   Date of Visit: 3/3/2022       To Whom It May Concern: It is my medical opinion that Xiomy Roberson may return to work on 03/07/2022. If you have any questions or concerns, please don't hesitate to call.     Sincerely,        Garrett Mcguire RN

## 2022-03-04 VITALS
SYSTOLIC BLOOD PRESSURE: 99 MMHG | HEIGHT: 60 IN | DIASTOLIC BLOOD PRESSURE: 67 MMHG | BODY MASS INDEX: 27.48 KG/M2 | RESPIRATION RATE: 16 BRPM | TEMPERATURE: 97.8 F | WEIGHT: 140 LBS | HEART RATE: 79 BPM | OXYGEN SATURATION: 95 %

## 2022-03-04 LAB
ANION GAP SERPL CALCULATED.3IONS-SCNC: 12 MMOL/L (ref 3–16)
BASOPHILS ABSOLUTE: 0 K/UL (ref 0–0.2)
BASOPHILS RELATIVE PERCENT: 0.4 %
BUN BLDV-MCNC: 10 MG/DL (ref 7–20)
CALCIUM SERPL-MCNC: 9.1 MG/DL (ref 8.3–10.6)
CHLORIDE BLD-SCNC: 103 MMOL/L (ref 99–110)
CO2: 23 MMOL/L (ref 21–32)
CREAT SERPL-MCNC: 0.6 MG/DL (ref 0.6–1.1)
EOSINOPHILS ABSOLUTE: 0.1 K/UL (ref 0–0.6)
EOSINOPHILS RELATIVE PERCENT: 1.5 %
GFR AFRICAN AMERICAN: >60
GFR NON-AFRICAN AMERICAN: >60
GLUCOSE BLD-MCNC: 83 MG/DL (ref 70–99)
HCT VFR BLD CALC: 36.3 % (ref 36–48)
HEMOGLOBIN: 12.3 G/DL (ref 12–16)
LYMPHOCYTES ABSOLUTE: 1.3 K/UL (ref 1–5.1)
LYMPHOCYTES RELATIVE PERCENT: 13.4 %
MCH RBC QN AUTO: 30.2 PG (ref 26–34)
MCHC RBC AUTO-ENTMCNC: 33.9 G/DL (ref 31–36)
MCV RBC AUTO: 89 FL (ref 80–100)
MONOCYTES ABSOLUTE: 0.6 K/UL (ref 0–1.3)
MONOCYTES RELATIVE PERCENT: 6 %
NEUTROPHILS ABSOLUTE: 7.4 K/UL (ref 1.7–7.7)
NEUTROPHILS RELATIVE PERCENT: 78.7 %
PDW BLD-RTO: 13 % (ref 12.4–15.4)
PLATELET # BLD: 223 K/UL (ref 135–450)
PMV BLD AUTO: 8.6 FL (ref 5–10.5)
POTASSIUM REFLEX MAGNESIUM: 3.8 MMOL/L (ref 3.5–5.1)
RBC # BLD: 4.08 M/UL (ref 4–5.2)
SODIUM BLD-SCNC: 138 MMOL/L (ref 136–145)
WBC # BLD: 9.4 K/UL (ref 4–11)

## 2022-03-04 PROCEDURE — 80048 BASIC METABOLIC PNL TOTAL CA: CPT

## 2022-03-04 PROCEDURE — 36415 COLL VENOUS BLD VENIPUNCTURE: CPT

## 2022-03-04 PROCEDURE — 85025 COMPLETE CBC W/AUTO DIFF WBC: CPT

## 2022-03-04 PROCEDURE — 6370000000 HC RX 637 (ALT 250 FOR IP): Performed by: NURSE PRACTITIONER

## 2022-03-04 PROCEDURE — G0378 HOSPITAL OBSERVATION PER HR: HCPCS

## 2022-03-04 RX ORDER — POLYETHYLENE GLYCOL 3350 17 G/17G
17 POWDER, FOR SOLUTION ORAL DAILY PRN
Qty: 7 EACH | Refills: 0 | Status: SHIPPED | OUTPATIENT
Start: 2022-03-04

## 2022-03-04 RX ADMIN — ACETAMINOPHEN 650 MG: 325 TABLET ORAL at 09:08

## 2022-03-04 ASSESSMENT — PAIN SCALES - GENERAL
PAINLEVEL_OUTOF10: 0
PAINLEVEL_OUTOF10: 5
PAINLEVEL_OUTOF10: 5

## 2022-03-04 ASSESSMENT — PAIN DESCRIPTION - LOCATION: LOCATION: HEAD

## 2022-03-04 ASSESSMENT — PAIN DESCRIPTION - PAIN TYPE: TYPE: ACUTE PAIN

## 2022-03-04 NOTE — PROGRESS NOTES
Discharge paperwork explained to pt. Pt verbalized understanding. Pt wheeled to lobby via wheelchair.

## 2022-03-04 NOTE — PROGRESS NOTES
30 y/o female admitted with constipation. resolved with enema. followed by GI. at this time Abdomen soft nontender. Active bowel sounds. Denies n/v. Tolerating clears. Patient showered, IV removed and is okay to leave IV out per NP order.

## 2022-03-04 NOTE — PROGRESS NOTES
Per NP  was given the order to advance diet to Regular Diet. Advised pt to order something bland to avoid upsetting her stomach.

## 2022-03-04 NOTE — PLAN OF CARE
Problem:  Bowel/Gastric:  Goal: Bowel function will improve  Description: Bowel function will improve  Outcome: Ongoing

## 2022-03-04 NOTE — PROGRESS NOTES
Progress Note    Patient Fouzia Mccann  MRN: [id number]  YOB: 1985 Age: 29 y.o. Sex: female  Room: [address] [place] [address]       Admitting Physician: Shaquille Galvez MD   Date of Admission: 3/3/2022 11:52 AM   Primary Care Physician: Juju Madden MD     Subjective:  Fouzia Mccann was seen and examined. We are following for severe constipation/abdominal pain. -- Wbc much improved at 9.4.   --Several BM's reported overnight. --No nausea/vomiting   --Overall feeling better. ROS:  Constitutional: Denies fever, no change in appetite  Respiratory: Denies cough or shortness of breath  Cardiovascular: Denies chest pain or edema    Objective:  Vital Signs:   Vitals:    03/04/22 0904   BP:    Pulse:    Resp: 16   Temp:    SpO2:          Physical Exam:  Constitutional: Alert and oriented x 4. No acute distress. Respiratory: Respirations nonlabored, no crepitus  GI: Abdomen nondistended, soft, and \"soreness\" to lower abdomen with palpation. Neurological: No focal deficits noted. No asterixis.     Intake/Output:  No intake or output data in the 24 hours ending 03/04/22 1257     Current Medications:  Current Facility-Administered Medications   Medication Dose Route Frequency Provider Last Rate Last Admin    sodium chloride flush 0.9 % injection 5-40 mL  5-40 mL IntraVENous 2 times per day Wendy Boyd APRN - CNP        sodium chloride flush 0.9 % injection 5-40 mL  5-40 mL IntraVENous PRN Wendy Boyd APRN - CNP        0.9 % sodium chloride infusion  25 mL IntraVENous PRN AIDAN Starkey - CNP        enoxaparin (LOVENOX) injection 40 mg  40 mg SubCUTAneous Daily Wendy Boyd APRN - CITLALI        ondansetron (ZOFRAN-ODT) disintegrating tablet 4 mg  4 mg Oral Q8H PRN AIDAN Starkey - CNP   4 mg at 03/03/22 1844    Or    ondansetron (ZOFRAN) injection 4 mg  4 mg IntraVENous Q6H PRN Wendy Boyd APRN - CITLALI        polyethylene glycol (GLYCOLAX) packet 17 g  17 g Oral Daily PRN IADAN Starkey - CITLALI acetaminophen (TYLENOL) tablet 650 mg  650 mg Oral Q6H PRN Debe Bernardo, APRN - CNP   650 mg at 03/04/22 2096    Or    acetaminophen (TYLENOL) suppository 650 mg  650 mg Rectal Q6H PRN Debe Bernardo, APRN - CNP             Recent labs and imaging reviewed. Assessment:    Pleasant 28 yo F with abdominal pain, rectal discomfort. Associated nausea/vomiting. Found to have Fecal impaction/dilated rectum on recent imaging. History of mild constipation averaging BM every 2 days or so. No Fever/chills, nausea/vomting today. Having good active BM's. WBC normalized. No rectal bleeding. Plan:  Okay for discharge if tolerating advanced diet. Recommend OP Colonoscopy in OP setting, will arrange. EGD planned at that time as well for chronic RUQ abdominal pain. Recommend Miralax 17 GM twice daily at time of discharge. Call GI as needed. Thank you for consult. Call with any further questions/concerns. Discussed with patient, family and Dr. Emiliano Chavez while at bedside. Sebastián Tavera MD    [facility]    [phone].  Also available via [employer]

## 2022-03-07 ENCOUNTER — OFFICE VISIT (OUTPATIENT)
Dept: FAMILY MEDICINE CLINIC | Age: 37
End: 2022-03-07
Payer: COMMERCIAL

## 2022-03-07 VITALS
BODY MASS INDEX: 27.41 KG/M2 | WEIGHT: 139.6 LBS | SYSTOLIC BLOOD PRESSURE: 112 MMHG | DIASTOLIC BLOOD PRESSURE: 76 MMHG | OXYGEN SATURATION: 98 % | HEIGHT: 60 IN | HEART RATE: 94 BPM

## 2022-03-07 DIAGNOSIS — K59.00 CONSTIPATION, UNSPECIFIED CONSTIPATION TYPE: Primary | ICD-10-CM

## 2022-03-07 DIAGNOSIS — R11.0 NAUSEA: ICD-10-CM

## 2022-03-07 DIAGNOSIS — M79.89 PAIN AND SWELLING OF RIGHT FOREARM: ICD-10-CM

## 2022-03-07 DIAGNOSIS — M79.631 PAIN AND SWELLING OF RIGHT FOREARM: ICD-10-CM

## 2022-03-07 PROCEDURE — G8484 FLU IMMUNIZE NO ADMIN: HCPCS | Performed by: FAMILY MEDICINE

## 2022-03-07 PROCEDURE — 1111F DSCHRG MED/CURRENT MED MERGE: CPT | Performed by: FAMILY MEDICINE

## 2022-03-07 PROCEDURE — 99213 OFFICE O/P EST LOW 20 MIN: CPT | Performed by: FAMILY MEDICINE

## 2022-03-07 PROCEDURE — G8417 CALC BMI ABV UP PARAM F/U: HCPCS | Performed by: FAMILY MEDICINE

## 2022-03-07 PROCEDURE — 1036F TOBACCO NON-USER: CPT | Performed by: FAMILY MEDICINE

## 2022-03-07 PROCEDURE — G8427 DOCREV CUR MEDS BY ELIG CLIN: HCPCS | Performed by: FAMILY MEDICINE

## 2022-03-07 ASSESSMENT — PATIENT HEALTH QUESTIONNAIRE - PHQ9
4. FEELING TIRED OR HAVING LITTLE ENERGY: 2
10. IF YOU CHECKED OFF ANY PROBLEMS, HOW DIFFICULT HAVE THESE PROBLEMS MADE IT FOR YOU TO DO YOUR WORK, TAKE CARE OF THINGS AT HOME, OR GET ALONG WITH OTHER PEOPLE: 1
9. THOUGHTS THAT YOU WOULD BE BETTER OFF DEAD, OR OF HURTING YOURSELF: 0
SUM OF ALL RESPONSES TO PHQ9 QUESTIONS 1 & 2: 5
SUM OF ALL RESPONSES TO PHQ QUESTIONS 1-9: 13
3. TROUBLE FALLING OR STAYING ASLEEP: 2
8. MOVING OR SPEAKING SO SLOWLY THAT OTHER PEOPLE COULD HAVE NOTICED. OR THE OPPOSITE, BEING SO FIGETY OR RESTLESS THAT YOU HAVE BEEN MOVING AROUND A LOT MORE THAN USUAL: 0
SUM OF ALL RESPONSES TO PHQ QUESTIONS 1-9: 13
2. FEELING DOWN, DEPRESSED OR HOPELESS: 2
1. LITTLE INTEREST OR PLEASURE IN DOING THINGS: 3
6. FEELING BAD ABOUT YOURSELF - OR THAT YOU ARE A FAILURE OR HAVE LET YOURSELF OR YOUR FAMILY DOWN: 1
SUM OF ALL RESPONSES TO PHQ QUESTIONS 1-9: 13
7. TROUBLE CONCENTRATING ON THINGS, SUCH AS READING THE NEWSPAPER OR WATCHING TELEVISION: 0
SUM OF ALL RESPONSES TO PHQ QUESTIONS 1-9: 13
5. POOR APPETITE OR OVEREATING: 3

## 2022-03-07 ASSESSMENT — ENCOUNTER SYMPTOMS: NAUSEA: 1

## 2022-03-07 NOTE — PROGRESS NOTES
Chief Complaint   Patient presents with    Follow-Up from Hospital     Patient was in [facility] 3/3/22-3/4/22 for constipation. HPI:  Kelsey Barros is a 40 y.o. (: 1985) here today   for follow up from hospital.  Patient was in [facility] 3/3/22-3/4/22 for constipation. Was admitted w/ sig emesis. Dx w/ constipation. Was in ER at [address] Transferred to [facility]. Daughter w/ stomach bug 1 week prior. Had sig elevation in wbc. Was on IV cipro at [facility]. Did not cont abx at [facility]. Had enemas at hospital.  Had diff keeping down cscope prep. Seen by GI prior to d/c. Ongoing issues w/ nausea. Still feels weak. Plans on GI appt as an outpt. Has not had HIDA yet. GI wants to do egd and cscope prior. Has been using miralax. Daily bm overall. Patient also has knot on her right arm that she noticed this morning. Painful knot to right forearm, below the site of recent IV. Noted today. HPI    Patient's medications, allergies, past medical, surgical, social and family histories were reviewed and updated as appropriate. ROS:  Review of Systems   Constitutional: Positive for fatigue. Gastrointestinal: Positive for nausea. Psychiatric/Behavioral: Positive for dysphoric mood.            Microscopic Examination (no units)   Date Value   2021 YES     LDL Calculated (mg/dL)   Date Value   2020 157 (H)       Past Medical History:   Diagnosis Date    Anesthesia complication     decreased B/P     Blood clot in vein     Right ovary    Complication of anesthesia     Low blood pressure    Depression     Endometriosis     Factor V deficiency (HCC)     Heart disease     Hyperlipidemia     no meds    Overactive bladder     SVT (supraventricular tachycardia) (HCC)     Trauma        Family History   Problem Relation Age of Onset    High Blood Pressure Mother     Cancer Father        Social History     Socioeconomic History    Marital status: Legally      Spouse name: Not on file    Number of children: Not on file    Years of education: Not on file    Highest education level: Not on file   Occupational History    Not on file   Tobacco Use    Smoking status: Never Smoker    Smokeless tobacco: Never Used   Vaping Use    Vaping Use: Never used   Substance and Sexual Activity    Alcohol use: Yes     Comment: rarely    Drug use: No    Sexual activity: Yes   Other Topics Concern    Not on file   Social History Narrative    Not on file     Social Determinants of Health     Financial Resource Strain:     Difficulty of Paying Living Expenses: Not on file   Food Insecurity:     Worried About Running Out of Food in the Last Year: Not on file    Clara of Food in the Last Year: Not on file   Transportation Needs:     Lack of Transportation (Medical): Not on file    Lack of Transportation (Non-Medical): Not on file   Physical Activity:     Days of Exercise per Week: Not on file    Minutes of Exercise per Session: Not on file   Stress:     Feeling of Stress : Not on file   Social Connections:     Frequency of Communication with Friends and Family: Not on file    Frequency of Social Gatherings with Friends and Family: Not on file    Attends Zoroastrian Services: Not on file    Active Member of [address] [place] [address] or Organizations: Not on file    Attends Club or Organization Meetings: Not on file    Marital Status: Not on file   Intimate Partner Violence:     Fear of Current or Ex-Partner: Not on file    Emotionally Abused: Not on file    Physically Abused: Not on file    Sexually Abused: Not on file   Housing Stability:     Unable to Pay for Housing in the Last Year: Not on file    Number of [place] in the Last Year: Not on file    Unstable Housing in the Last Year: Not on file       Prior to Visit Medications    Medication Sig Taking?  Authorizing Provider   ondansetron (ZOFRAN) 4 MG tablet Take 1 tablet by mouth every 8 hours as needed for Nausea or Vomiting Yes Florina Sicard, MD   cetirizine (ZYRTEC) 10 MG tablet Take 10 mg by mouth daily Yes Historical Provider, MD   polyethylene glycol (GLYCOLAX) 17 g packet Take 17 g by mouth daily as needed for Constipation  Patient not taking: Reported on 3/7/2022  AIDAN Evans - CNP       Allergies   Allergen Reactions    Dilaudid [Hydromorphone Hcl] Other (See Comments)     tachycardia    Amoxicillin Hives    Bactrim [Sulfamethoxazole-Trimethoprim] Swelling    Ceclor [Cefaclor]      Rash      Cephalexin      swelling    Clindamycin/Lincomycin Swelling     swelling    Phenergan [Promethazine Hcl]      Increased heart rate    Sulfa Antibiotics      swelling    Zithromax [Azithromycin] Other (See Comments)     Stomach pains    Adhesive Tape Itching and Rash    Morphine Palpitations       OBJECTIVE:    /76   Pulse 94   Ht 5' (1.524 m)   Wt 139 lb 9.6 oz (63.3 kg)   LMP 02/01/2018   SpO2 98%   BMI 27.26 kg/m²     BP Readings from Last 2 Encounters:   03/07/22 112/76   03/04/22 99/67       Wt Readings from Last 3 Encounters:   03/07/22 139 lb 9.6 oz (63.3 kg)   03/03/22 140 lb (63.5 kg)   01/19/22 147 lb 3.2 oz (66.8 kg)       Physical Exam  Constitutional:       Appearance: Normal appearance. HENT:      Head: Normocephalic and atraumatic. Eyes:      Extraocular Movements: Extraocular movements intact. Cardiovascular:      Rate and Rhythm: Normal rate and regular rhythm. Pulmonary:      Effort: Pulmonary effort is normal.      Breath sounds: Normal breath sounds. Musculoskeletal:        Arms:    Neurological:      General: No focal deficit present. Mental Status: She is alert and oriented to person, place, and time. Psychiatric:         Mood and Affect: Mood normal.         Behavior: Behavior normal.           ASSESSMENT/PLAN:    1. Constipation, unspecified constipation type  Plans on GI f/u appt and possible egd/cscope.       2. Pain and swelling of right forearm  Recent IV.  Firm area to forearm. rec warm compresses and asa. Arrange u/s to r/o dvt  - VL Extremity Venous Right; Future    3. Nausea  See above. Not feeling well today. Has been having bm on miralax.

## 2022-03-08 ENCOUNTER — HOSPITAL ENCOUNTER (OUTPATIENT)
Dept: VASCULAR LAB | Age: 37
Discharge: HOME | End: 2022-03-08
Payer: COMMERCIAL

## 2022-03-08 ENCOUNTER — TELEPHONE (OUTPATIENT)
Dept: FAMILY MEDICINE CLINIC | Age: 37
End: 2022-03-08

## 2022-03-08 DIAGNOSIS — J01.90 ACUTE BACTERIAL SINUSITIS: ICD-10-CM

## 2022-03-08 DIAGNOSIS — B96.89 ACUTE BACTERIAL SINUSITIS: ICD-10-CM

## 2022-03-08 DIAGNOSIS — M79.89 PAIN AND SWELLING OF RIGHT FOREARM: ICD-10-CM

## 2022-03-08 DIAGNOSIS — J40 BRONCHITIS: ICD-10-CM

## 2022-03-08 DIAGNOSIS — M79.631 PAIN AND SWELLING OF RIGHT FOREARM: ICD-10-CM

## 2022-03-08 PROCEDURE — 93971 EXTREMITY STUDY: CPT

## 2022-03-08 RX ORDER — ONDANSETRON 4 MG/1
4 TABLET, FILM COATED ORAL EVERY 8 HOURS PRN
Qty: 20 TABLET | Refills: 0 | Status: SHIPPED | OUTPATIENT
Start: 2022-03-08 | End: 2022-05-10 | Stop reason: SDUPTHER

## 2022-03-08 NOTE — TELEPHONE ENCOUNTER
See result note regarding the ultrasound regarding treatment. Otherwise, okay to call in Zofran 4 mg every 8 hours as needed #20 no refills for nausea.

## 2022-03-08 NOTE — TELEPHONE ENCOUNTER
Patient calling and states that she is still having pain in the right arm. Patient says the pain is making her nauseated. She is asking what she needs to do and if something can be calling in for the nausea so she can return to work. Patient uses [employer].   Patient is also asking if she needs to be on anything other than aspirin due to her Factor V.

## 2022-03-25 NOTE — DISCHARGE SUMMARY
Hospital Medicine Discharge Summary    Patient ID: Marcelino Mcwilliams      Patient's PCP: Nereyda Ko MD    Admit Date: 3/3/2022     Discharge Date: 3/4/2022      Admitting Provider: Cady Suarez MD     Discharge Provider: AIDAN Rosenthal - CNP     Discharge Diagnoses: Active Hospital Problems    Diagnosis     Constipation [K59.00]        The patient was seen and examined on day of discharge and this discharge summary is in conjunction with any daily progress note from day of discharge. Hospital Course:     29 y.o. female with a PMH of Factor V def, SVT, and Depression who presented to [address] [place] [address] with complaint of abdominal pain, nausea and constipation. Patient was transferred from [facility] for GI evaluation. Patient states she developed sudden onset of low abd pain, nausea and vomiting last evening. She did report that her daughter had N/V about 1 wk ago. She was evaluated by GI in Jan '22 for RUQ pain and hemorrhoids. She has a HIDA scan planned. CT scan shows constipation, WBC 19k, Received a dose of Cipro IV, Toradol and IVF. Abdominal pain/Constipation  -GI consulted  -CT abd/pelvis constipation/fecal impaction  -FOBT neg  -Covid neg  -Zofran  -CLD  -Golytely   -Toradol prn     Patient evaluated by GI. Plan to arrange outpatient colonoscopy/EGD. Continue Miralax BID. Patient reports she feels better, had BM, tolerating diet. Leukocytosis improved-likely stress response, no further abx. Diet recommendations provided. Physical Exam Performed:     BP 99/67   Pulse 79   Temp 97.8 °F (36.6 °C) (Oral)   Resp 16   Ht 5' (1.524 m)   Wt 140 lb (63.5 kg)   LMP 02/01/2018   SpO2 95%   BMI 27.34 kg/m²       General appearance:  No apparent distress, appears stated age and cooperative. HEENT:  Normal cephalic, atraumatic without obvious deformity. Pupils equal, round, and reactive to light. Extra ocular muscles intact. Conjunctivae/corneas clear.   Neck: Supple, with full range of motion. No jugular venous distention. Trachea midline. Respiratory:  Normal respiratory effort. Clear to auscultation, bilaterally without Rales/Wheezes/Rhonchi. Cardiovascular:  Regular rate and rhythm with normal S1/S2 without murmurs, rubs or gallops. Abdomen: Soft, non-tender, non-distended with normal bowel sounds. Musculoskeletal:  No clubbing, cyanosis or edema bilaterally. Full range of motion without deformity. Skin: Skin color, texture, turgor normal.  No rashes or lesions. Neurologic:  Neurovascularly intact without any focal sensory/motor deficits. Cranial nerves: II-XII intact, grossly non-focal.  Psychiatric:  Alert and oriented, thought content appropriate, normal insight  Capillary Refill: Brisk,< 3 seconds   Peripheral Pulses: +2 palpable, equal bilaterally       Labs:  For convenience and continuity at follow-up the following most recent labs are provided:      CBC:    Lab Results   Component Value Date    WBC 9.4 03/04/2022    HGB 12.3 03/04/2022    HCT 36.3 03/04/2022     03/04/2022       Renal:    Lab Results   Component Value Date     03/04/2022    K 3.8 03/04/2022     03/04/2022    CO2 23 03/04/2022    BUN 10 03/04/2022    CREATININE 0.6 03/04/2022    CALCIUM 9.1 03/04/2022         Significant Diagnostic Studies    Radiology:   No orders to display          Consults:     IP CONSULT TO GI    Disposition: Home    Condition at Discharge: Stable    Discharge Instructions/Follow-up:  See AVS    Code Status:  Prior     Activity: activity as tolerated    Diet: regular diet      Discharge Medications:     Discharge Medication List as of 3/4/2022  1:05 PM           Details   polyethylene glycol (GLYCOLAX) 17 g packet Take 17 g by mouth daily as needed for Constipation, Disp-7 each, R-0Normal              Details   ondansetron (ZOFRAN) 4 MG tablet Take 1 tablet by mouth every 8 hours as needed for Nausea or Vomiting, Disp-20 tablet, R-0Normal      cetirizine (ZYRTEC) 10 MG tablet Take 10 mg by mouth dailyHistorical Med      hydrocortisone (ANUSOL-HC) 25 MG suppository Place 1 suppository rectally every 12 hours, Disp-20 suppository, R-0Normal             Time Spent on discharge is more than 30 minutes in the examination, evaluation, counseling and review of medications and discharge plan. Signed:    AIDAN Barraza - CNP   3/25/2022      Thank you Renee Rogers MD for the opportunity to be involved in this patient's care. If you have any questions or concerns please feel free to contact me at [phone].

## 2022-05-10 DIAGNOSIS — J40 BRONCHITIS: ICD-10-CM

## 2022-05-10 DIAGNOSIS — B96.89 ACUTE BACTERIAL SINUSITIS: ICD-10-CM

## 2022-05-10 DIAGNOSIS — J01.90 ACUTE BACTERIAL SINUSITIS: ICD-10-CM

## 2022-05-10 RX ORDER — ONDANSETRON 4 MG/1
4 TABLET, FILM COATED ORAL EVERY 8 HOURS PRN
Qty: 20 TABLET | Refills: 0 | Status: SHIPPED | OUTPATIENT
Start: 2022-05-10 | End: 2022-07-13 | Stop reason: SDUPTHER

## 2022-07-13 DIAGNOSIS — J01.90 ACUTE BACTERIAL SINUSITIS: ICD-10-CM

## 2022-07-13 DIAGNOSIS — J40 BRONCHITIS: ICD-10-CM

## 2022-07-13 DIAGNOSIS — B96.89 ACUTE BACTERIAL SINUSITIS: ICD-10-CM

## 2022-07-13 RX ORDER — ONDANSETRON 4 MG/1
4 TABLET, FILM COATED ORAL EVERY 8 HOURS PRN
Qty: 20 TABLET | Refills: 0 | Status: SHIPPED | OUTPATIENT
Start: 2022-07-13 | End: 2022-08-28 | Stop reason: ALTCHOICE

## 2022-07-13 NOTE — TELEPHONE ENCOUNTER
Future appt scheduled 0 appointment scheduled                  Last appt 03/07/2022      Last Written 05/10/2022    ondansetron (ZOFRAN) 4 MG tablet  #20  0 RF

## 2022-08-28 ENCOUNTER — TELEMEDICINE (OUTPATIENT)
Dept: PRIMARY CARE CLINIC | Age: 37
End: 2022-08-28
Payer: COMMERCIAL

## 2022-08-28 DIAGNOSIS — U07.1 COVID-19: ICD-10-CM

## 2022-08-28 DIAGNOSIS — R50.9 FEVER OF UNKNOWN ORIGIN: ICD-10-CM

## 2022-08-28 DIAGNOSIS — J02.9 SORE THROAT: ICD-10-CM

## 2022-08-28 DIAGNOSIS — R05.9 COUGH: Primary | ICD-10-CM

## 2022-08-28 DIAGNOSIS — R51.9 ACUTE INTRACTABLE HEADACHE, UNSPECIFIED HEADACHE TYPE: ICD-10-CM

## 2022-08-28 DIAGNOSIS — R11.0 NAUSEA: ICD-10-CM

## 2022-08-28 PROCEDURE — G8427 DOCREV CUR MEDS BY ELIG CLIN: HCPCS | Performed by: NURSE PRACTITIONER

## 2022-08-28 PROCEDURE — 99213 OFFICE O/P EST LOW 20 MIN: CPT | Performed by: NURSE PRACTITIONER

## 2022-08-28 RX ORDER — ONDANSETRON HYDROCHLORIDE 8 MG/1
8 TABLET, FILM COATED ORAL EVERY 8 HOURS PRN
Qty: 20 TABLET | Refills: 0 | Status: SHIPPED | OUTPATIENT
Start: 2022-08-28 | End: 2022-10-13 | Stop reason: SDUPTHER

## 2022-08-28 RX ORDER — BENZONATATE 100 MG/1
100 CAPSULE ORAL 3 TIMES DAILY PRN
Qty: 30 CAPSULE | Refills: 0 | Status: SHIPPED | OUTPATIENT
Start: 2022-08-28 | End: 2022-09-07

## 2022-08-28 ASSESSMENT — ENCOUNTER SYMPTOMS
SORE THROAT: 1
COUGH: 1
VOMITING: 1
NAUSEA: 1
SHORTNESS OF BREATH: 1

## 2022-08-28 NOTE — PROGRESS NOTES
Thor Duverney (:  1985) is a Established patient, here for evaluation of the following: Nonproductive cough, some shortness of breath, headaches, sore throat, nausea and vomiting, fevers, body aches and chills since Friday  Tested positive yesterday with at home COVID-19 test    Assessment & Plan   Below is the assessment and plan developed based on review of pertinent history, physical exam, labs, studies, and medications. 1. Cough  Problem  -     benzonatate (TESSALON) 100 MG capsule; Take 1 capsule by mouth 3 times daily as needed for Cough, Disp-30 capsule, R-0Normal  See patient instructions    2. Nausea  Problem  -     ondansetron (ZOFRAN) 8 MG tablet; Take 1 tablet by mouth every 8 hours as needed for Nausea or Vomiting, Disp-20 tablet, R-0Normal  See patient instructions    3. Acute intractable headache, unspecified headache type  Problem  Continue Tylenol and ibuprofen as the bottles direct May alternate    4. Sore throat  Problem  See patient instructions    5. Fever of unknown origin  Problem  See patient instructions    6. COVID-19  Problem  -     nirmatrelvir/ritonavir (PAXLOVID) 20 x 150 MG & 10 x 100MG TBPK; Take 3 tablets (two 150 mg nirmatrelvir and one 100 mg ritonavir tablets) by mouth every 12 hours for 5 days. , Disp-30 tablet, R-0Normal    Instructions for COVID-19  Currently, the CDC recommends staying at home in self isolation for at least 5 days. After that, if you are without a fever and symptoms are improving, you may go out, but only if wearing a mask for an additional 5 days. The mainstay of treatment for most people remains focused on symptom control, isolating and watching for signs of worsening illness. You should drink plenty of fluids, eat when you are able, and rest as much as possible. Recommend treating symptoms with OTC medications: Flonase for congestions, Mucinex for Phlegm, Robitussin DM or Delsym for cough, Tylenol/Ibuprofen for fever/body aches. Recommend Vitamin D, C and Zinc. These are all over the counter     We do not prescribe antibiotics for viral illnesses; however, we can treat secondary infections should the need arise. Please seek more urgent medical attention if you develop any of the following:  Chest pain  Shortness of breath  Bluish lips or face  Severe headache   Severe dizziness or passing out  New confusion  Inability to stay awake  Extreme weakness  If you have a pulse oximeter, check it at least daily. Seek urgent medical attention if it drops to 92% or below. Note was given to return to work on September 1. It was explained to the patient to follow-up with her PCP if an extension of this note is required. 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 She verbalizes understanding of this    Follow-up with PCP if symptoms do not improve or if worsen  Call the clinic with any concerns or questions    Subjective   This is a 30-year-old female patient of Dr. Ted Wilson consenting to a virtual visit  On-call physician requested a video visit this morning  Patient states that since Friday she has been experiencing a nonproductive cough with some shortness of breath noted, headaches, sore throat, nausea and vomiting, fevers up to 102, body aches and chills. Her symptoms have been worsening since Friday and yesterday she did an at home COVID test and it was positive. She has been taking Zofran 4 mg but this is not helping with her nausea and vomiting. She is also been taking Tylenol and ibuprofen alternating for her headaches fevers body aches and chills. We did discuss during the visit antiviral medication and she is requesting this. Review of Systems   Constitutional:  Positive for chills, fatigue and fever. HENT:  Positive for sore throat. Respiratory:  Positive for cough and shortness of breath. Gastrointestinal:  Positive for nausea and vomiting.    Neurological: Positive for headaches. All other systems reviewed and are negative. Objective   Patient-Reported Vitals  Patient-Reported Pulse: 85  Patient-Reported Temperature: 102  Patient-Reported Weight: 135 pounds  Patient-Reported Pulse Oximetry: 95%     Physical Exam  [INSTRUCTIONS:  \"[x]\" Indicates a positive item  \"[]\" Indicates a negative item  -- DELETE ALL ITEMS NOT EXAMINED]    Constitutional: [x] Appears well-developed and well-nourished [x] No apparent distress      [x] Abnormal -ill-appearing    Mental status: [x] Alert and awake  [x] Oriented to person/place/time [x] Able to follow commands    [] Abnormal -     Eyes:   EOM    [x]  Normal    [] Abnormal -   Sclera  [x]  Normal    [] Abnormal -          Discharge [x]  None visible   [] Abnormal -     HENT: [x] Normocephalic, atraumatic  [] Abnormal -   [] Mouth/Throat: Mucous membranes are moist    External Ears [] Normal  [] Abnormal -    Neck: [x] No visualized mass [] Abnormal -     Pulmonary/Chest: [x] Respiratory effort normal   [x] No visualized signs of difficulty breathing or respiratory distress        [] Abnormal -      Musculoskeletal:   [] Normal gait with no signs of ataxia         [x] Normal range of motion of neck        [] Abnormal -     Neurological:        [x] No Facial Asymmetry (Cranial nerve 7 motor function) (limited exam due to video visit)          [] No gaze palsy        [] Abnormal -          Skin:        [x] No significant exanthematous lesions or discoloration noted on facial skin         [] Abnormal -            Psychiatric:       [x] Normal Affect [] Abnormal -         On this date 8/28/2022 I have spent 25 minutes reviewing previous notes, test results and face to face (virtual) with the patient discussing the diagnosis and importance of compliance with the treatment plan as well as documenting on the day of the visit. Priscilla Verma, was evaluated through a synchronous (real-time) audio-video encounter.  The patient (or guardian if applicable) is aware that this is a billable service, which includes applicable co-pays. This Virtual Visit was conducted with patient's (and/or legal guardian's) consent. The visit was conducted pursuant to the emergency declaration under the [facility], [address] [place] [address] authority and the [employer] Act. Patient identification was verified, and a caregiver was present when appropriate. The patient was [place] [address] [place] [address].    Provider was [facility] [address], A[place] - [place]

## 2022-08-28 NOTE — LETTER
[id number]  [facility] [address]  Phone: [phone]  Fax: [phone]    AIDAN Santos CNP        August 28, 2022     Patient: Thor Duverney   YOB: 1985   Date of Visit: 8/28/2022       To Whom it May Concern:    Zoran Izquierdo was seen in my clinic on 8/28/2022. She may return to work on Sept. 1, 2022. If you have any questions or concerns, please don't hesitate to call.     Sincerely,         ADIAN Monae - CNP

## 2022-08-30 ENCOUNTER — TELEMEDICINE (OUTPATIENT)
Dept: FAMILY MEDICINE CLINIC | Age: 37
End: 2022-08-30
Payer: COMMERCIAL

## 2022-08-30 DIAGNOSIS — U07.1 COVID-19: Primary | ICD-10-CM

## 2022-08-30 PROCEDURE — G8427 DOCREV CUR MEDS BY ELIG CLIN: HCPCS | Performed by: FAMILY MEDICINE

## 2022-08-30 PROCEDURE — 99213 OFFICE O/P EST LOW 20 MIN: CPT | Performed by: FAMILY MEDICINE

## 2022-08-30 RX ORDER — METHYLPREDNISOLONE 4 MG/1
TABLET ORAL
Qty: 1 KIT | Refills: 0 | Status: SHIPPED | OUTPATIENT
Start: 2022-08-30 | End: 2022-09-05

## 2022-08-30 ASSESSMENT — ENCOUNTER SYMPTOMS
SHORTNESS OF BREATH: 1
COUGH: 1

## 2022-08-30 NOTE — PROGRESS NOTES
2022    TELEHEALTH EVALUATION -- Audio/Visual (During GPKZA-60 public health emergency)    HPI:    Arslan Dawkins (:  1985) has requested an audio/video evaluation for the following concern(s):    Patient recently diagnosed with COVID. She was given Paxil bid. Stated that her symptoms were worse. Symptoms began Friday. Tested positive for covid on Saturday. Seen by virtualist on . Took 1 pill of paxlovid. Sig n/v w/ med. Only took 1 dose. Temp around 100. Over weekend, was 102. Cough and sob noted as well. Cough has not been productive. \"Stuck in her throat. \"  Overall feels weak. HR to 160 at times. Has been trying to drink more water. Was able to eat yesterday. Oxygen sats 95-96 range. Has been taking mucinex. Alternating tylenol and motrin. Has been using halls. Review of Systems   Constitutional:  Positive for fever. HENT:  Positive for congestion. Respiratory:  Positive for cough and shortness of breath. Cardiovascular:  Positive for palpitations. Musculoskeletal:  Positive for myalgias. Prior to Visit Medications    Medication Sig Taking? Authorizing Provider   methylPREDNISolone (MEDROL DOSEPACK) 4 MG tablet Take by mouth.  Yes Neftali Faustin MD   benzonatate (TESSALON) 100 MG capsule Take 1 capsule by mouth 3 times daily as needed for Cough Yes AIDAN Soria CNP   cetirizine (ZYRTEC) 10 MG tablet Take 10 mg by mouth daily Yes Historical Provider, MD   ondansetron (ZOFRAN) 8 MG tablet Take 1 tablet by mouth every 8 hours as needed for Nausea or Vomiting  AIDAN Soria CNP   polyethylene glycol (GLYCOLAX) 17 g packet Take 17 g by mouth daily as needed for Constipation  Patient not taking: Reported on 3/7/2022  AIDAN Marquez CNP       Social History     Tobacco Use    Smoking status: Never    Smokeless tobacco: Never   Vaping Use    Vaping Use: Never used   Substance Use Topics    Alcohol use: Yes     Comment: rarely    Drug use: No        Allergies   Allergen Reactions    Dilaudid [Hydromorphone Hcl] Other (See Comments)     tachycardia    Amoxicillin Hives    Bactrim [Sulfamethoxazole-Trimethoprim] Swelling    Ceclor [Cefaclor]      Rash      Cephalexin      swelling    Clindamycin/Lincomycin Swelling     swelling    Phenergan [Promethazine Hcl]      Increased heart rate    Sulfa Antibiotics      swelling    Zithromax [Azithromycin] Other (See Comments)     Stomach pains    Adhesive Tape Itching and Rash    Morphine Palpitations   ,   Past Medical History:   Diagnosis Date    Anesthesia complication     decreased B/P     Blood clot in vein     Right ovary    Complication of anesthesia     Low blood pressure    Depression     Endometriosis     Factor V deficiency (HCC)     Heart disease     Hyperlipidemia     no meds    Overactive bladder     SVT (supraventricular tachycardia) ([facility])     Trauma        PHYSICAL EXAMINATION:  [ INSTRUCTIONS:  \"[x]\" Indicates a positive item  \"[]\" Indicates a negative item  -- DELETE ALL ITEMS NOT EXAMINED]  Vital Signs: (As obtained by patient/caregiver or practitioner observation)    Blood pressure-  Heart rate-    Respiratory rate-    Temperature-  Pulse oximetry-     Constitutional: [x] Appears well-developed and well-nourished [] No apparent distress      [x] Abnormal-   Mental status  [x] Alert and awake  [x] Oriented to person/place/time []Able to follow commands      Eyes:  EOM    [x]  Normal  [] Abnormal-  Sclera  []  Normal  [] Abnormal -         Discharge []  None visible  [] Abnormal -    HENT:   [x] Normocephalic, atraumatic.   [] Abnormal   [] Mouth/Throat: Mucous membranes are moist.     External Ears [] Normal  [] Abnormal-     Neck: [] No visualized mass     Pulmonary/Chest: [x] Respiratory effort normal.  [] No visualized signs of difficulty breathing or respiratory distress        [] Abnormal-      Musculoskeletal:   [] Normal gait with no signs of ataxia         [] Normal range of motion of neck        [] Abnormal-       Neurological:        [] No Facial Asymmetry (Cranial nerve 7 motor function) (limited exam to video visit)          [] No gaze palsy        [] Abnormal-         Skin:        [] No significant exanthematous lesions or discoloration noted on facial skin         [] Abnormal-            Psychiatric:       [x] Normal Affect [x] No Hallucinations        [] Abnormal-     Other pertinent observable physical exam findings-     Due to this being a TeleHealth encounter, evaluation of the following organ systems is limited: Vitals/Constitutional/EENT/Resp/CV/GI//MS/Neuro/Skin/Heme-Lymph-Imm. ASSESSMENT/PLAN:  1. COVID-19  Significant symptoms as described above. Oxygen saturations have been reasonable in the mid 90s. She has had issues with tachycardia. This is not new for her. We did discuss the importance of staying well-hydrated. Also recommended adding either a humidifier in her bedroom or some nasal saline to thin out her respiratory secretions. Try adding a steroid as below to help with symptom relief. Continue Tylenol and ibuprofen. We also discussed some other over-the-counter treatment options that may help symptomatically. If her breathing were to worsen or fail to improve, could consider chest x-ray for further evaluation. At this time, she is scheduled to be off work until Thursday. If she is not able to return, will consider providing a note due to her ongoing symptoms. Patient to call if symptoms fail to improve  - methylPREDNISolone (MEDROL DOSEPACK) 4 MG tablet; Take by mouth. Dispense: 1 kit; Refill: 0    No follow-ups on file. An  electronic signature was used to authenticate this note. --Sheldon Du MD on 8/30/2022 at 5:07 PM    This document was prepared by a combination of typing and transcription through a voice recognition software.       Pursuant to the emergency declaration under the [facility], [address] waiver authority and the [employer] Act, this Virtual  Visit was conducted, with patient's consent, to reduce the patient's risk of exposure to COVID-19 and provide continuity of care for an established patient. Services were provided through a video synchronous discussion virtually to substitute for in-person clinic visit.

## 2022-09-02 ENCOUNTER — TELEPHONE (OUTPATIENT)
Dept: FAMILY MEDICINE CLINIC | Age: 37
End: 2022-09-02

## 2022-09-02 NOTE — TELEPHONE ENCOUNTER
See note from earlier this week. Given severity of symptoms and ongoing symptoms at that time, agree that should be off yesterday and today. Return on Monday w/ masking as long as symptoms improving.

## 2022-10-07 ENCOUNTER — OFFICE VISIT (OUTPATIENT)
Dept: OBGYN CLINIC | Age: 37
End: 2022-10-07
Payer: COMMERCIAL

## 2022-10-07 VITALS
WEIGHT: 135.8 LBS | HEART RATE: 73 BPM | DIASTOLIC BLOOD PRESSURE: 68 MMHG | TEMPERATURE: 98.1 F | SYSTOLIC BLOOD PRESSURE: 104 MMHG | BODY MASS INDEX: 26.52 KG/M2

## 2022-10-07 DIAGNOSIS — R10.2 PELVIC PAIN: Primary | ICD-10-CM

## 2022-10-07 DIAGNOSIS — R30.0 DYSURIA: ICD-10-CM

## 2022-10-07 PROCEDURE — G8484 FLU IMMUNIZE NO ADMIN: HCPCS | Performed by: OBSTETRICS & GYNECOLOGY

## 2022-10-07 PROCEDURE — 99213 OFFICE O/P EST LOW 20 MIN: CPT | Performed by: OBSTETRICS & GYNECOLOGY

## 2022-10-07 PROCEDURE — G8417 CALC BMI ABV UP PARAM F/U: HCPCS | Performed by: OBSTETRICS & GYNECOLOGY

## 2022-10-07 PROCEDURE — 1036F TOBACCO NON-USER: CPT | Performed by: OBSTETRICS & GYNECOLOGY

## 2022-10-07 PROCEDURE — G8427 DOCREV CUR MEDS BY ELIG CLIN: HCPCS | Performed by: OBSTETRICS & GYNECOLOGY

## 2022-10-07 RX ORDER — NITROFURANTOIN 25; 75 MG/1; MG/1
100 CAPSULE ORAL 2 TIMES DAILY
Qty: 20 CAPSULE | Refills: 0 | Status: SHIPPED | OUTPATIENT
Start: 2022-10-07 | End: 2022-10-17

## 2022-10-07 NOTE — PROGRESS NOTES
Return Gyn Office Visit    CC:   Chief Complaint   Patient presents with    Pelvic Pain     Left side, with n/v     Urinary Pain       HPI:  40 y.o. [id number] who presents to office for left ovarian pain as well as pelvic pain. Pelvic pain has been coming and going for 2-3 weeks, has occasional issues with this due to ovarian cysts however this seems a lot worse. Is s/p hyst+RSO.     +dysuria, ?able hematuria, +frequency. +n/v,      Objective:  /68 (Site: Left Upper Arm, Position: Sitting, Cuff Size: Medium Adult)   Pulse 73   Temp 98.1 °F (36.7 °C) (Infrared)   Wt 135 lb 12.8 oz (61.6 kg)   LMP 02/01/2018   BMI 26.52 kg/m²   Physical Exam  HENT:      Head: Normocephalic. Cardiovascular:      Rate and Rhythm: Normal rate. Pulmonary:      Effort: Pulmonary effort is normal. No respiratory distress. Abdominal:      Palpations: Abdomen is soft. Tenderness: There is abdominal tenderness (mild TTP in RLQ and right flank). There is no guarding or rebound. Genitourinary:     Comments: Pelvic exam: VULVA: normal appearing vulva with no masses, tenderness or lesions, UTERUS: surgically absent, vaginal cuff well healed, ADNEXA: +TTP in RLQ  Neurological:      General: No focal deficit present. Mental Status: She is alert. Psychiatric:         Mood and Affect: Mood normal.       Assessment/Plan   Diagnosis Orders   1. Pelvic pain        2.  Dysuria  Urinalysis with Microscopic    Culture, Urine        - Concern for possible UTI with symptoms and exam today, UA/cultre and will send rx for macrobid as well  - return/ER precautions removed, discussed if symptoms do not improve or worsen, fevers/chills develop or severe pain/nausea/vomiting to present to ER for eval for possible pyelo or appendicits, pt understands    Dispo: PRN or for annual exam  Jarvis Castrejon MD

## 2022-10-08 LAB
BACTERIA: ABNORMAL /HPF
BILIRUBIN URINE: NEGATIVE
BLOOD, URINE: NEGATIVE
CLARITY: ABNORMAL
COLOR: YELLOW
EPITHELIAL CELLS, UA: 11 /HPF (ref 0–5)
GLUCOSE URINE: NEGATIVE MG/DL
HYALINE CASTS: 0 /LPF (ref 0–8)
KETONES, URINE: NEGATIVE MG/DL
LEUKOCYTE ESTERASE, URINE: NEGATIVE
MICROSCOPIC EXAMINATION: YES
NITRITE, URINE: NEGATIVE
PH UA: 6 (ref 5–8)
PROTEIN UA: NEGATIVE MG/DL
RBC UA: 2 /HPF (ref 0–4)
SPECIFIC GRAVITY UA: 1.01 (ref 1–1.03)
URINE TYPE: ABNORMAL
UROBILINOGEN, URINE: 0.2 E.U./DL
WBC UA: 1 /HPF (ref 0–5)

## 2022-10-09 LAB — URINE CULTURE, ROUTINE: NORMAL

## 2022-10-13 DIAGNOSIS — R11.0 NAUSEA: ICD-10-CM

## 2022-10-13 RX ORDER — ONDANSETRON HYDROCHLORIDE 8 MG/1
8 TABLET, FILM COATED ORAL EVERY 8 HOURS PRN
Qty: 20 TABLET | Refills: 0 | Status: SHIPPED | OUTPATIENT
Start: 2022-10-13

## 2022-10-17 DIAGNOSIS — F41.9 ANXIETY: ICD-10-CM

## 2022-10-17 RX ORDER — HYDROXYZINE PAMOATE 50 MG/1
50 CAPSULE ORAL 2 TIMES DAILY PRN
Qty: 45 CAPSULE | Refills: 1 | Status: SHIPPED | OUTPATIENT
Start: 2022-10-17

## 2022-12-01 ENCOUNTER — NURSE ONLY (OUTPATIENT)
Dept: FAMILY MEDICINE CLINIC | Age: 37
End: 2022-12-01
Payer: COMMERCIAL

## 2022-12-01 DIAGNOSIS — Z23 NEED FOR IMMUNIZATION AGAINST INFLUENZA: Primary | ICD-10-CM

## 2022-12-01 PROCEDURE — 90674 CCIIV4 VAC NO PRSV 0.5 ML IM: CPT | Performed by: FAMILY MEDICINE

## 2022-12-01 PROCEDURE — 90471 IMMUNIZATION ADMIN: CPT | Performed by: FAMILY MEDICINE

## 2022-12-02 DIAGNOSIS — R11.0 NAUSEA: ICD-10-CM

## 2022-12-02 RX ORDER — ONDANSETRON HYDROCHLORIDE 8 MG/1
8 TABLET, FILM COATED ORAL EVERY 8 HOURS PRN
Qty: 20 TABLET | Refills: 0 | Status: SHIPPED | OUTPATIENT
Start: 2022-12-02

## 2023-01-04 ENCOUNTER — OFFICE VISIT (OUTPATIENT)
Dept: FAMILY MEDICINE CLINIC | Age: 38
End: 2023-01-04
Payer: COMMERCIAL

## 2023-01-04 VITALS
BODY MASS INDEX: 28.07 KG/M2 | WEIGHT: 143 LBS | HEIGHT: 60 IN | DIASTOLIC BLOOD PRESSURE: 82 MMHG | SYSTOLIC BLOOD PRESSURE: 128 MMHG | OXYGEN SATURATION: 99 % | HEART RATE: 78 BPM

## 2023-01-04 DIAGNOSIS — F41.9 ANXIETY: Primary | ICD-10-CM

## 2023-01-04 DIAGNOSIS — D68.51 FACTOR V LEIDEN MUTATION (HCC): ICD-10-CM

## 2023-01-04 PROCEDURE — 1036F TOBACCO NON-USER: CPT | Performed by: FAMILY MEDICINE

## 2023-01-04 PROCEDURE — G8417 CALC BMI ABV UP PARAM F/U: HCPCS | Performed by: FAMILY MEDICINE

## 2023-01-04 PROCEDURE — G8427 DOCREV CUR MEDS BY ELIG CLIN: HCPCS | Performed by: FAMILY MEDICINE

## 2023-01-04 PROCEDURE — 99213 OFFICE O/P EST LOW 20 MIN: CPT | Performed by: FAMILY MEDICINE

## 2023-01-04 PROCEDURE — G8482 FLU IMMUNIZE ORDER/ADMIN: HCPCS | Performed by: FAMILY MEDICINE

## 2023-01-04 RX ORDER — VENLAFAXINE HYDROCHLORIDE 37.5 MG/1
37.5 CAPSULE, EXTENDED RELEASE ORAL DAILY
Qty: 30 CAPSULE | Refills: 3 | Status: SHIPPED | OUTPATIENT
Start: 2023-01-04

## 2023-01-04 ASSESSMENT — PATIENT HEALTH QUESTIONNAIRE - PHQ9
3. TROUBLE FALLING OR STAYING ASLEEP: 0
SUM OF ALL RESPONSES TO PHQ QUESTIONS 1-9: 0
2. FEELING DOWN, DEPRESSED OR HOPELESS: 0
7. TROUBLE CONCENTRATING ON THINGS, SUCH AS READING THE NEWSPAPER OR WATCHING TELEVISION: 0
10. IF YOU CHECKED OFF ANY PROBLEMS, HOW DIFFICULT HAVE THESE PROBLEMS MADE IT FOR YOU TO DO YOUR WORK, TAKE CARE OF THINGS AT HOME, OR GET ALONG WITH OTHER PEOPLE: 0
SUM OF ALL RESPONSES TO PHQ QUESTIONS 1-9: 0
SUM OF ALL RESPONSES TO PHQ QUESTIONS 1-9: 0
8. MOVING OR SPEAKING SO SLOWLY THAT OTHER PEOPLE COULD HAVE NOTICED. OR THE OPPOSITE, BEING SO FIGETY OR RESTLESS THAT YOU HAVE BEEN MOVING AROUND A LOT MORE THAN USUAL: 0
1. LITTLE INTEREST OR PLEASURE IN DOING THINGS: 0
SUM OF ALL RESPONSES TO PHQ QUESTIONS 1-9: 0
6. FEELING BAD ABOUT YOURSELF - OR THAT YOU ARE A FAILURE OR HAVE LET YOURSELF OR YOUR FAMILY DOWN: 0
SUM OF ALL RESPONSES TO PHQ9 QUESTIONS 1 & 2: 0
4. FEELING TIRED OR HAVING LITTLE ENERGY: 0
9. THOUGHTS THAT YOU WOULD BE BETTER OFF DEAD, OR OF HURTING YOURSELF: 0
5. POOR APPETITE OR OVEREATING: 0

## 2023-01-04 NOTE — PROGRESS NOTES
Chief Complaint   Patient presents with    Check-Up       HPI:  Greg Davis is a 40 y.o. (: 1985) here today   for check up. HPI  Rarely taking hydroxyzine. Still w/ arm numbness and chest pressure. Prior ablation. No sig issues w/ tachycardia since that time. Prior echo. Prior stress test.  No sig changes to arm numbness noted. Typically takes hydroxyzine rarely. Does fair. Hx of factor V leiden. Did have some leg swelling and knot few weeks ago. Improved on it's own. Patient's medications, allergies, past medical, surgical, social and family histories were reviewed and updated as appropriate. ROS:  Review of Systems   Constitutional:  Negative for fever. Cardiovascular:  Negative for palpitations. Psychiatric/Behavioral:  The patient is nervous/anxious.           Microscopic Examination (no units)   Date Value   10/07/2022 YES     LDL Calculated (mg/dL)   Date Value   2020 157 (H)       Past Medical History:   Diagnosis Date    Anesthesia complication     decreased B/P     Blood clot in vein     Right ovary    Complication of anesthesia     Low blood pressure    Depression     Endometriosis     Factor V deficiency (HCC)     Heart disease     Hyperlipidemia     no meds    Overactive bladder     SVT (supraventricular tachycardia) (HCC)     Trauma        Family History   Problem Relation Age of Onset    High Blood Pressure Mother     Cancer Father        Social History     Socioeconomic History    Marital status: Legally      Spouse name: Not on file    Number of children: Not on file    Years of education: Not on file    Highest education level: Not on file   Occupational History    Not on file   Tobacco Use    Smoking status: Never    Smokeless tobacco: Never   Vaping Use    Vaping Use: Never used   Substance and Sexual Activity    Alcohol use: Yes     Comment: rarely    Drug use: No    Sexual activity: Yes     Partners: Male   Other Topics Concern    Not on file Social History Narrative    Not on file     Social Determinants of Health     Financial Resource Strain: Not on file   Food Insecurity: Not on file   Transportation Needs: Not on file   Physical Activity: Not on file   Stress: Not on file   Social Connections: Not on file   Intimate Partner Violence: Not on file   Housing Stability: Not on file       Prior to Visit Medications    Medication Sig Taking? Authorizing Provider   venlafaxine (EFFEXOR XR) 37.5 MG extended release capsule Take 1 capsule by mouth daily Yes Citlalli Herzog MD   ondansetron (ZOFRAN) 8 MG tablet Take 1 tablet by mouth every 8 hours as needed for Nausea or Vomiting Yes Citlalli Herzog MD   hydrOXYzine pamoate (VISTARIL) 50 MG capsule Take 1 capsule by mouth 2 times daily as needed for Anxiety Yes Citlalli Herzog MD   cetirizine (ZYRTEC) 10 MG tablet Take 10 mg by mouth daily Yes Historical Provider, MD       Allergies   Allergen Reactions    Dilaudid [Hydromorphone Hcl] Other (See Comments)     tachycardia    Amoxicillin Hives    Bactrim [Sulfamethoxazole-Trimethoprim] Swelling    Ceclor [Cefaclor]      Rash      Cephalexin      swelling    Clindamycin/Lincomycin Swelling     swelling    Phenergan [Promethazine Hcl]      Increased heart rate    Sulfa Antibiotics      swelling    Zithromax [Azithromycin] Other (See Comments)     Stomach pains    Adhesive Tape Itching and Rash    Morphine Palpitations       OBJECTIVE:    /82   Pulse 78   Ht 5' (1.524 m)   Wt 143 lb (64.9 kg)   LMP 02/01/2018   SpO2 99%   BMI 27.93 kg/m²     BP Readings from Last 2 Encounters:   01/04/23 128/82   10/07/22 104/68       Wt Readings from Last 3 Encounters:   01/04/23 143 lb (64.9 kg)   10/07/22 135 lb 12.8 oz (61.6 kg)   03/07/22 139 lb 9.6 oz (63.3 kg)       Physical Exam  Constitutional:       Appearance: Normal appearance. HENT:      Head: Normocephalic and atraumatic. Eyes:      Extraocular Movements: Extraocular movements intact. Cardiovascular:      Rate and Rhythm: Normal rate and regular rhythm. Pulmonary:      Effort: Pulmonary effort is normal.      Breath sounds: Normal breath sounds. Skin:     General: Skin is warm. Neurological:      General: No focal deficit present. Mental Status: She is alert and oriented to person, place, and time. Psychiatric:         Mood and Affect: Mood normal.         Behavior: Behavior normal.         ASSESSMENT/PLAN:    1. Anxiety  Has been on celexa, lexapro, trintellix, buspar in the past.  Using prn hydroxyzine. Trial of med as below. Call w/ response.    - venlafaxine (EFFEXOR XR) 37.5 MG extended release capsule; Take 1 capsule by mouth daily  Dispense: 30 capsule; Refill: 3    2. Factor V Leiden mutation ([facility]  Monitor sxs. Call if any s/sxs of clot.

## 2023-01-09 ENCOUNTER — TELEMEDICINE (OUTPATIENT)
Dept: FAMILY MEDICINE CLINIC | Age: 38
End: 2023-01-09
Payer: COMMERCIAL

## 2023-01-09 DIAGNOSIS — H92.02 OTALGIA, LEFT: ICD-10-CM

## 2023-01-09 DIAGNOSIS — R09.81 SINUS CONGESTION: ICD-10-CM

## 2023-01-09 DIAGNOSIS — J98.8 RESPIRATORY INFECTION: Primary | ICD-10-CM

## 2023-01-09 DIAGNOSIS — R05.9 COUGH, UNSPECIFIED TYPE: ICD-10-CM

## 2023-01-09 DIAGNOSIS — R09.89 CHEST CONGESTION: ICD-10-CM

## 2023-01-09 PROCEDURE — G8427 DOCREV CUR MEDS BY ELIG CLIN: HCPCS

## 2023-01-09 PROCEDURE — G8417 CALC BMI ABV UP PARAM F/U: HCPCS

## 2023-01-09 PROCEDURE — G8482 FLU IMMUNIZE ORDER/ADMIN: HCPCS

## 2023-01-09 PROCEDURE — 1036F TOBACCO NON-USER: CPT

## 2023-01-09 PROCEDURE — 99214 OFFICE O/P EST MOD 30 MIN: CPT

## 2023-01-09 RX ORDER — DOXYCYCLINE HYCLATE 100 MG
100 TABLET ORAL 2 TIMES DAILY
Qty: 20 TABLET | Refills: 0 | Status: SHIPPED | OUTPATIENT
Start: 2023-01-09 | End: 2023-01-19

## 2023-01-09 ASSESSMENT — ENCOUNTER SYMPTOMS
SINUS PAIN: 1
TROUBLE SWALLOWING: 0
SORE THROAT: 1
NAUSEA: 0
SHORTNESS OF BREATH: 0
WHEEZING: 0
RHINORRHEA: 1
SINUS PRESSURE: 1
DIARRHEA: 0
COUGH: 1

## 2023-01-09 NOTE — PROGRESS NOTES
2023    TELEHEALTH EVALUATION -- Audio/Visual (During CVPCJ-87 public health emergency)    HPI:  Started Thursday with H/A and pressure in sinus. Having sore from sinus drainage and cough and frequent clearing of throat. Cough is green and clear in color depending on each time she clears her throat or coughs. Feels like lymph nodes in her neck are swollen up. Took covid test today and was negative. Feels like is not getting any better and that symptoms are worsening. Now is having significant left ear pain. Denies fever. Priscilla Verma (:  1985) has requested an audio/video evaluation for the following concern(s):        Review of Systems   Constitutional:  Positive for fatigue. Negative for fever. HENT:  Positive for congestion, ear pain, postnasal drip, rhinorrhea, sinus pressure, sinus pain and sore throat. Negative for trouble swallowing. Respiratory:  Positive for cough. Negative for shortness of breath and wheezing. Gastrointestinal:  Negative for diarrhea and nausea. Musculoskeletal: Negative. Skin: Negative. Neurological:  Positive for headaches. Psychiatric/Behavioral: Negative. Prior to Visit Medications    Medication Sig Taking?  Authorizing Provider   doxycycline hyclate (VIBRA-TABS) 100 MG tablet Take 1 tablet by mouth 2 times daily for 10 days Yes AIDAN Murray CNP   ondansetron (ZOFRAN) 8 MG tablet Take 1 tablet by mouth every 8 hours as needed for Nausea or Vomiting Yes Silvia Blackburn MD   hydrOXYzine pamoate (VISTARIL) 50 MG capsule Take 1 capsule by mouth 2 times daily as needed for Anxiety Yes Silvia Blackburn MD   cetirizine (ZYRTEC) 10 MG tablet Take 10 mg by mouth daily Yes Historical Provider, MD   venlafaxine (EFFEXOR XR) 37.5 MG extended release capsule Take 1 capsule by mouth daily  Patient not taking: Reported on 2023  Silvia Blackburn MD       Social History     Tobacco Use    Smoking status: Never    Smokeless tobacco: Never Vaping Use    Vaping Use: Never used   Substance Use Topics    Alcohol use: Yes     Comment: rarely    Drug use: No        Allergies   Allergen Reactions    Dilaudid [Hydromorphone Hcl] Other (See Comments)     tachycardia    Amoxicillin Hives    Bactrim [Sulfamethoxazole-Trimethoprim] Swelling    Ceclor [Cefaclor]      Rash      Cephalexin      swelling    Clindamycin/Lincomycin Swelling     swelling    Phenergan [Promethazine Hcl]      Increased heart rate    Sulfa Antibiotics      swelling    Zithromax [Azithromycin] Other (See Comments)     Stomach pains    Adhesive Tape Itching and Rash    Morphine Palpitations   ,   Past Medical History:   Diagnosis Date    Anesthesia complication     decreased B/P     Blood clot in vein     Right ovary    Complication of anesthesia     Low blood pressure    Depression     Endometriosis     Factor V deficiency (HCC)     Heart disease     Hyperlipidemia     no meds    Overactive bladder     SVT (supraventricular tachycardia) ([facility])     Trauma        PHYSICAL EXAMINATION:  [ INSTRUCTIONS:  \"[x]\" Indicates a positive item  \"[]\" Indicates a negative item  -- DELETE ALL ITEMS NOT EXAMINED]  Vital Signs: (As obtained by patient/caregiver or practitioner observation)    Blood pressure-N/A heart rate-N/A   respiratory rate-N/A   temperature-N/A pulse oximetry-N/A    Constitutional: [x] Appears well-developed and well-nourished [x] No apparent distress      [] Abnormal-   Mental status  [x] Alert and awake  [x] Oriented to person/place/time [x]Able to follow commands      Eyes:  EOM    [x]  Normal  [] Abnormal-  Sclera  [x]  Normal  [] Abnormal -         Discharge [x]  None visible  [] Abnormal -    HENT:   [x] Normocephalic, atraumatic.   [] Abnormal   [] Mouth/Throat: Mucous membranes are moist.     External Ears [x] Normal  [] Abnormal-     Neck: [x] No visualized mass     Pulmonary/Chest: [x] Respiratory effort normal.  [x] No visualized signs of difficulty breathing or respiratory distress        [] Abnormal-      Musculoskeletal:   [] Normal gait with no signs of ataxia         [x] Normal range of motion of neck        [] Abnormal-       Neurological:        [x] No Facial Asymmetry (Cranial nerve 7 motor function) (limited exam to video visit)          [] No gaze palsy        [] Abnormal-         Skin:        [x] No significant exanthematous lesions or discoloration noted on facial skin         [] Abnormal-            Psychiatric:       [x] Normal Affect [] No Hallucinations        [] Abnormal-     Other pertinent observable physical exam findings-none    Due to this being a TeleHealth encounter, evaluation of the following organ systems is limited: Vitals/Constitutional/EENT/Resp/CV/GI//MS/Neuro/Skin/Heme-Lymph-Imm. ASSESSMENT/PLAN:  1. Respiratory infection  See above HPI for symptoms and onset. Explained the patient her symptoms could be viral in nature which is my main assumption at this time given presentation and initial onset. I am going to treat with doxycycline to cover from a bacterial standpoint particularly given the possibility of secondary sinus infection that has transpired to possible ear infection on the left side given the patient's significant pain in left ear. I explained to the patient viral symptoms can typically last 7 to 10 days. I recommended the patient continue monitoring for worsening symptoms. May take Tylenol as needed for any fever that develops and/or headache. Recommend well-balanced diet and adequate hydration for immune support. Patient advised to contact the office if symptoms fail to improve or worsen. If symptoms worsen regard to chest congestion or cough will order chest x-ray. Advised the use of throat lozenges, cough drops, warm salt water gargles to help soothe sore throat.  - doxycycline hyclate (VIBRA-TABS) 100 MG tablet; Take 1 tablet by mouth 2 times daily for 10 days  Dispense: 20 tablet; Refill: 0    2.  Otalgia, left  See above HPI.  Given this was a video visit I cannot examine the patient's left ear I am going to go ahead and treat with doxycycline due to suspicion of secondary ear infection from sinus congestion. Recommended the patient use Flonase and Zyrtec daily x1 week to help with sinus symptoms as well as alleviate any effusion it may be causing some of the patient's ear pain. Follow-up with office if symptoms fail to improve or worsen. - doxycycline hyclate (VIBRA-TABS) 100 MG tablet; Take 1 tablet by mouth 2 times daily for 10 days  Dispense: 20 tablet; Refill: 0    3. Cough, unspecified type  See above #1  - doxycycline hyclate (VIBRA-TABS) 100 MG tablet; Take 1 tablet by mouth 2 times daily for 10 days  Dispense: 20 tablet; Refill: 0    4. Chest congestion  See above #1  - doxycycline hyclate (VIBRA-TABS) 100 MG tablet; Take 1 tablet by mouth 2 times daily for 10 days  Dispense: 20 tablet; Refill: 0    5. Sinus congestion  See above #1  - doxycycline hyclate (VIBRA-TABS) 100 MG tablet; Take 1 tablet by mouth 2 times daily for 10 days  Dispense: 20 tablet; Refill: 0    30 minutes spent on patient care today. This document was prepared by a combination of typing and transcription through a voice recognition software. No follow-ups on file. An  electronic signature was used to authenticate this note. --John Jim, AIDAN - CNP on 1/9/2023 at 4:41 PM        Pursuant to the emergency declaration under the [facility], [facility] waiver authority and the [email or web address] and [employer] Act, this Virtual  Visit was conducted, with patient's consent, to reduce the patient's risk of exposure to COVID-19 and provide continuity of care for an established patient. Services were provided through a video synchronous discussion virtually to substitute for in-person clinic visit.

## 2023-01-13 DIAGNOSIS — R11.0 NAUSEA: ICD-10-CM

## 2023-01-13 RX ORDER — ONDANSETRON HYDROCHLORIDE 8 MG/1
8 TABLET, FILM COATED ORAL EVERY 8 HOURS PRN
Qty: 20 TABLET | Refills: 0 | Status: SHIPPED | OUTPATIENT
Start: 2023-01-13

## 2023-03-24 DIAGNOSIS — R11.0 NAUSEA: ICD-10-CM

## 2023-03-24 RX ORDER — ONDANSETRON HYDROCHLORIDE 8 MG/1
8 TABLET, FILM COATED ORAL EVERY 8 HOURS PRN
Qty: 20 TABLET | Refills: 0 | Status: SHIPPED | OUTPATIENT
Start: 2023-03-24

## 2023-04-05 ENCOUNTER — OFFICE VISIT (OUTPATIENT)
Dept: FAMILY MEDICINE CLINIC | Age: 38
End: 2023-04-05
Payer: COMMERCIAL

## 2023-04-05 VITALS
OXYGEN SATURATION: 97 % | HEART RATE: 82 BPM | WEIGHT: 145 LBS | SYSTOLIC BLOOD PRESSURE: 108 MMHG | DIASTOLIC BLOOD PRESSURE: 76 MMHG | BODY MASS INDEX: 28.32 KG/M2

## 2023-04-05 DIAGNOSIS — F33.1 MDD (MAJOR DEPRESSIVE DISORDER), RECURRENT EPISODE, MODERATE (HCC): ICD-10-CM

## 2023-04-05 DIAGNOSIS — I47.1 ATRIAL TACHYCARDIA (HCC): ICD-10-CM

## 2023-04-05 DIAGNOSIS — G43.111 INTRACTABLE MIGRAINE WITH AURA WITH STATUS MIGRAINOSUS: Primary | ICD-10-CM

## 2023-04-05 PROCEDURE — 99214 OFFICE O/P EST MOD 30 MIN: CPT

## 2023-04-05 PROCEDURE — G8417 CALC BMI ABV UP PARAM F/U: HCPCS

## 2023-04-05 PROCEDURE — G8427 DOCREV CUR MEDS BY ELIG CLIN: HCPCS

## 2023-04-05 PROCEDURE — 1036F TOBACCO NON-USER: CPT

## 2023-04-05 RX ORDER — SUMATRIPTAN 50 MG/1
50 TABLET, FILM COATED ORAL
Qty: 9 TABLET | Refills: 3 | Status: SHIPPED | OUTPATIENT
Start: 2023-04-05 | End: 2023-04-05

## 2023-04-05 SDOH — ECONOMIC STABILITY: FOOD INSECURITY: WITHIN THE PAST 12 MONTHS, THE FOOD YOU BOUGHT JUST DIDN'T LAST AND YOU DIDN'T HAVE MONEY TO GET MORE.: NEVER TRUE

## 2023-04-05 SDOH — ECONOMIC STABILITY: HOUSING INSECURITY
IN THE LAST 12 MONTHS, WAS THERE A TIME WHEN YOU DID NOT HAVE A STEADY PLACE TO SLEEP OR SLEPT IN A SHELTER (INCLUDING NOW)?: NO

## 2023-04-05 SDOH — ECONOMIC STABILITY: FOOD INSECURITY: WITHIN THE PAST 12 MONTHS, YOU WORRIED THAT YOUR FOOD WOULD RUN OUT BEFORE YOU GOT MONEY TO BUY MORE.: NEVER TRUE

## 2023-04-05 SDOH — ECONOMIC STABILITY: INCOME INSECURITY: HOW HARD IS IT FOR YOU TO PAY FOR THE VERY BASICS LIKE FOOD, HOUSING, MEDICAL CARE, AND HEATING?: NOT HARD AT ALL

## 2023-04-05 ASSESSMENT — ENCOUNTER SYMPTOMS
RESPIRATORY NEGATIVE: 1
NAUSEA: 1
PHOTOPHOBIA: 1

## 2023-04-05 NOTE — PROGRESS NOTES
Chief Complaint   Patient presents with    Migraine       HPI:  Sandra Crespo is a 41 y.o. (: 1985) here today   for evaluation of migraine symptoms. Patient states has had migraine headaches on and off for roughly 2 to 3 years but has never been diagnosed with migraine headaches. Patient states over the last couple years would only get intermittently every other few months. Over the last 2 or 3 months has recently started getting migraines a couple times a week. Patient reports symptoms usually start in her neck and wraparound the left side of her head to the eye. Causes increased pressure in the eye. Is light and noise sensitive. Has been having nausea symptoms with recent episodes. Visual changes and facial numbness also noted on affected side. On Monday he had to lay in a dark room could not complete all of her tasks as he had done. Still had lingering symptoms fairly significant on Tuesday and still with symptoms today in regard to sensation in her head that is not fully subsided. Has been taking Tylenol and Motrin. Drinks about 60 to 70 ounces of water daily. Patient's medications, allergies, past medical, surgical, social and family histories were reviewed and updated asappropriate. ROS:  Review of Systems   Constitutional: Negative. HENT: Negative. Eyes:  Positive for photophobia and visual disturbance. Respiratory: Negative. Cardiovascular: Negative. Gastrointestinal:  Positive for nausea. Musculoskeletal:  Positive for neck pain and neck stiffness. Neurological:  Positive for light-headedness, numbness and headaches. Psychiatric/Behavioral: Negative. Prior to Visit Medications    Medication Sig Taking?  Authorizing Provider   SUMAtriptan (IMITREX) 50 MG tablet Take 1 tablet by mouth once as needed for Migraine Yes AIDAN Paul - CNP   ondansetron (ZOFRAN) 8 MG tablet Take 1 tablet by mouth every 8 hours as needed for Nausea or Vomiting

## 2023-05-04 DIAGNOSIS — R11.0 NAUSEA: ICD-10-CM

## 2023-05-04 RX ORDER — ONDANSETRON HYDROCHLORIDE 8 MG/1
8 TABLET, FILM COATED ORAL EVERY 8 HOURS PRN
Qty: 20 TABLET | Refills: 0 | Status: SHIPPED | OUTPATIENT
Start: 2023-05-04

## 2023-05-11 NOTE — H&P
History and Physical / Pre-Sedation Assessment    Patient:  Kaye Diaz   :   1985     Intended Procedure:  EP study and possible SVT ablation    HPI: see HPI below    Nurses notes reviewed and agreed. Medications reviewed  Allergies: Allergies   Allergen Reactions    Dilaudid [Hydromorphone Hcl] Other (See Comments)     tachycardia    Amoxicillin Hives    Bactrim [Sulfamethoxazole-Trimethoprim] Swelling    Ceclor [Cefaclor]      Rash      Cephalexin      swelling    Clindamycin/Lincomycin      swelling    Phenergan [Promethazine Hcl]      Increased heart rate    Sulfa Antibiotics      swelling    Zithromax [Azithromycin] Other (See Comments)     Stomach pains       Physical Exam:  Vital Signs: Ht 5' (1.524 m)   Wt 138 lb (62.6 kg)   LMP 2018   BMI 26.95 kg/m²  (see paper charting)  Airway: Mallampati: II (soft palate, uvula, fauces visible)  Pulmonary:Normal  Cardiac:Normal  Abdomen:Normal    Pre-Procedure Assessment / Plan:  ASA: Class 2 - A normal healthy patient with mild systemic disease  Level of Sedation Plan: Moderate sedation  Post Procedure plan: Return to same level of care    I assessed the patient and find that the patient is in satisfactory condition to proceed with the planned procedure and sedation plan. I have explained the risk, benefits, and alternatives to the procedure; the patient understands and agrees to proceed. H&P is reflective of office visit from 2019. No significant changes. Patric Dorman MD   2019    [address]   Electrophysiology H&P               Date:                19   Patient name:  Kaye Diaz  YOB: 1985     Primary Care physician: Jonh Arrington MD     HISTORY OF PRESENT ILLNESS: The patient is a 29 y.o.  female with a history of SVT, Factor V Leiden, and ovarian blood clot. Has been seen for SVT since at least  and an ablation was recommended.  Event monitor in 10/2016 showed PSVT and metoprolol was started. She was switched to Cardizem due to fatigue. Stress test in 2016 was normal. Echo in 2016 showed an EF of 55-60%.    Today she is being seen for SVT. EKG shows atrial tach with a HR of 124. She complains of some chest pressure and left arm weakness while tachycardic. Has nausea, fatigue, and heart racing on and off for one week. Is only taking metoprolol 25mg po BID. Is not taking Cardizem and states metoprolol drops HR to 60.      Past Medical History:   has a past medical history of Blood clot in vein and SVT (supraventricular tachycardia) ([facility] [address] [address]    Past Surgical History:   has a past surgical history that includes  section; Tonsillectomy; and Hysterectomy, total abdominal.      Home Medications:    Home Medications           Prior to Admission medications    Medication Sig Start Date End Date Taking? Authorizing Provider   metoprolol tartrate (LOPRESSOR) 25 MG tablet Take 1 tablet by mouth 2 times daily 19   Yes Kandy Reeder MD   cetirizine (ZYRTEC) 10 MG tablet Take 10 mg by mouth daily     Yes Historical Provider, MD   doxycycline hyclate (VIBRAMYCIN) 100 MG capsule Take 1 capsule by mouth 2 times daily for 10 days 19   Kandy Reeder MD   diltiazem (CARDIZEM CD) 180 MG extended release capsule Take 1 capsule by mouth daily 19     Kandy Reeder MD   metoprolol succinate (TOPROL XL) 25 MG extended release tablet Take 1 tablet by mouth daily 19     Kim Solorio MD            Allergies:  Dilaudid [hydromorphone hcl]; Amoxicillin; Bactrim [sulfamethoxazole-trimethoprim]; Ceclor [cefaclor]; Cephalexin; Clindamycin/lincomycin; Sulfa antibiotics; and Zithromax [azithromycin]     Social History:   reports that she has never smoked.  She has never used smokeless tobacco. She reports that she does not drink alcohol or use drugs.      Family History: family history includes High Blood Pressure in her mother.     Review of Systems Constitutional: + fatigue  HENT: Negative. Eyes: Negative. Respiratory: Negative. Cardiovascular: see HPI  Gastrointestinal: + nausea  Genitourinary: Negative. Musculoskeletal: Negative. Skin: Negative. Neurological: Negative. Hematological: Negative. Psychiatric/Behavioral: Negative.     PHYSICAL EXAM:    Vital signs:    /80   Pulse 126   Ht 4' 11\" (1.499 m)   Wt 143 lb (64.9 kg)   LMP 02/01/2018   SpO2 98%   BMI 28.88 kg/m²       Constitutional and general appearance: alert, cooperative, no distress and appears stated age  [de-identified]: PERRL, no cervical lymphadenopathy. No masses palpable. Normal oral mucosa  Respiratory:  · Normal excursion and expansion without use of accessory muscles  · Resp auscultation: Normal breath sounds without wheezing, rhonchi, and rales  Cardiovascular:  · The apical impulse is not displaced  · Heart tones are crisp and normal. regular S1 and S2.  · Jugular venous pulsation Normal  · The carotid upstroke is normal in amplitude and contour without delay or bruit  · Peripheral pulses are symmetrical and full   Abdomen:  · No masses or tenderness  · Bowel sounds present  Extremities:  ·  No cyanosis or clubbing  ·  No lower extremity edema  ·  Skin: warm and dry  Neurological:  · Alert and oriented  · Moves all extremities well  · No abnormalities of mood, affect, memory, mentation, or behavior are noted     DATA:    ECG 1/31/2019:  AT      Stress test 11/4/2016 [facility]):  RESTING ECG: Atrial tachycardia with nonspecific ST changes   STRESS ECG: SInus rhythm/tachycardia    Negative for ischemia     PERFUSION:   Technically good study  Normal perfusion    GATING:      The Gated wall motion calculated ejection fraction was 50 %.    Echo 11/1/2016:  Left ventricle: The cavity size was normal. Wall thickness was normal. Systolic function was normal. The calculated ejection fraction was in the range of 55% to 60%.  Wall motion was normal; there were no regional wall motion abnormalities. - Inferior vena cava: The vessel was normal in size; the respirophasic diameter changes were in the normal range (>= 50%); findings are consistent with normal central venous pressure.     CARDIOLOGY LABS:   CBC: No results for input(s): WBC, HGB, HCT, PLT in the last 72 hours. BMP: No results for input(s): NA, K, CO2, BUN, CREATININE, LABGLOM, GLUCOSE in the last 72 hours. PT/INR: No results for input(s): PROTIME, INR in the last 72 hours. APTT:No results for input(s): APTT in the last 72 hours. FASTING LIPID PANEL:No results found for: HDL, LDLDIRECT, LDLCALC, TRIG  LIVER PROFILE:No results for input(s): AST, ALT, ALB in the last 72 hours.        Assessment:   1. SVT/AT: known history, recurrent in 1/2019              -history of fatigue with metoprolol  2. Factor V Leiden  3. History of ovarian vein thrombus     Plan:   1. EP study and possible ablation for SVT recommended (risks, benefits, and alternative therapy discussed).      Saira Shine MD  [facility]  [phone] Klisyri Counseling:  I discussed with the patient the risks of Klisyri including but not limited to erythema, scaling, itching, weeping, crusting, and pain.

## 2023-05-23 ENCOUNTER — TELEPHONE (OUTPATIENT)
Dept: CARDIOLOGY CLINIC | Age: 38
End: 2023-05-23

## 2023-05-23 DIAGNOSIS — R00.2 PALPITATIONS: Primary | ICD-10-CM

## 2023-05-23 NOTE — TELEPHONE ENCOUNTER
Pt called and stated that she needs an ov. Pt was last seen 03/31/2023 agk. Pt was offered next available new pt slot for 09/05/2032 agk. Pt stated that she needs to be seen sooner due to experiencing fluttering, chest pain, sob and dizziness. Symptoms come and go. Please advise.

## 2023-05-24 NOTE — TELEPHONE ENCOUNTER
LOV [address] 3/31/2020  Last cardiac event monitor 3/2020  AGK please review and advise    Called and spoke with patient. She states symptoms started about 3 weeks ago. Prior to that she hadn't felt palpitations since her ablation in 2019. On average she has episodes about 3 times a week. Her palpitations are very brief and last a couple seconds in duration. She will feel associated chest pains, shortness of breath and dizziness with episodes. This occurs randomly, can be when she is at rest or when she is active. She is not able to check her blood pressure during her episodes but she states her HR at rest is normally in the 50-60's and during these brief episodes HR will increase to 90-100bpm but will quickly return to baseline.

## 2023-05-24 NOTE — TELEPHONE ENCOUNTER
Called and spoke with patient. Coming in for monitor (needs to be medilynx due to skin irritation with patches) on 5/30/2023 and overbooked with AGK (OK PER AGK) on 6/20/2023.

## 2023-05-30 ENCOUNTER — TELEPHONE (OUTPATIENT)
Dept: CARDIOLOGY CLINIC | Age: 38
End: 2023-05-30

## 2023-05-30 NOTE — TELEPHONE ENCOUNTER
Monitor placed by Cassandra Shannon, [address] [place] [address]  Length of monitor 2 wks  Monitor ordered by Daniel Fall code [phone]  Monitor number [id number]    Activation successful prior to pt leaving office?  Yes

## 2023-06-16 PROCEDURE — 93242 EXT ECG>48HR<7D RECORDING: CPT | Performed by: INTERNAL MEDICINE

## 2023-06-20 ENCOUNTER — OFFICE VISIT (OUTPATIENT)
Dept: CARDIOLOGY CLINIC | Age: 38
End: 2023-06-20
Payer: COMMERCIAL

## 2023-06-20 VITALS
HEART RATE: 83 BPM | WEIGHT: 147.8 LBS | DIASTOLIC BLOOD PRESSURE: 72 MMHG | SYSTOLIC BLOOD PRESSURE: 110 MMHG | OXYGEN SATURATION: 99 % | BODY MASS INDEX: 29.02 KG/M2 | HEIGHT: 60 IN

## 2023-06-20 DIAGNOSIS — I47.1 ATRIAL TACHYCARDIA (HCC): Primary | ICD-10-CM

## 2023-06-20 PROCEDURE — 99214 OFFICE O/P EST MOD 30 MIN: CPT | Performed by: INTERNAL MEDICINE

## 2023-06-20 PROCEDURE — 93000 ELECTROCARDIOGRAM COMPLETE: CPT | Performed by: INTERNAL MEDICINE

## 2023-06-20 NOTE — PROGRESS NOTES
[facility]   Electrophysiology Note      Reason for office visit: Follow-up and Tachycardia      HISTORY OF PRESENT ILLNESS: Rocio Heck is a 41 y.o. female who presents for follow up for SVT. She has a PMH of SVT, Factor V Leiden, and ovarian blood clot. She was seen for SVT since at least  and an ablation was recommended. GALO in 10/2016 showed PSVT and metoprolol was started. She was switched to Cardizem due to fatigue. Stress test in 2016 was normal.  Echo in 2016 showed an EF of 55-60%. In 2019 she had an EP study and RFCA for AT. Her echo on 3/26/20 was normal with an EF of 55%. She had a normal cardiac event monitor reported on 3/25/20. She wore this for palpitations. Interval History since last office visit: Today, 2023, EKG demonstrates SR 83 bpm. She had a technical malfunction with her event monitor. She is only able to wear Medilynx monitor due to skin allergy. She is having dizziness and chest pain. She is taking her medications as prescribed. Patient denies current edema, chest pain, sob, palpitations, or syncope. Past Medical History:   has a past medical history of Anesthesia complication, Blood clot in vein, Complication of anesthesia, Depression, Endometriosis, Factor V deficiency ([address] Heart disease, Hyperlipidemia, Overactive bladder, SVT (supraventricular tachycardia) ([address] and Trauma. Past Surgical History:   has a past surgical history that includes  section; Tonsillectomy; Hysterectomy, total abdominal; Atrial ablation surgery (2019); Dilation and curettage of uterus; and Dilation & curettage. Social History:   reports that she has never smoked. She has never used smokeless tobacco. She reports current alcohol use. She reports that she does not use drugs. Family History: family history includes Cancer in her father; High Blood Pressure in her mother.     Allergies:  Dilaudid [hydromorphone hcl], Amoxicillin,

## 2023-06-20 NOTE — PATIENT INSTRUCTIONS
RECOMMENDATIONS:  Bring in monitor once it is mailed to you, for placement. Continue current medications for now. We will call you with monitor results once we have them. Consider [id number] to monitor at home. Follow up in 6 weeks.

## 2023-06-21 ENCOUNTER — TELEPHONE (OUTPATIENT)
Dept: CARDIOLOGY CLINIC | Age: 38
End: 2023-06-21

## 2023-06-21 NOTE — TELEPHONE ENCOUNTER
Pt scheduled to see [address] [facility] on 8/15/23 at 3:45.    pt decided to see agk instead of waiting for kxa or new EPMD

## 2023-06-21 NOTE — TELEPHONE ENCOUNTER
Per [address] note from 6/20, please call patient to schedule first available with another EP MD. Will move appointment up after cardiac monitor is completed, if warranted. 6/20/2023  Patient resents for follow-up. She reports recurrence of her palpitations with chest pain. She also reports bradycardia with heart rates in the 40s based on her pulse oximeter. She has not been able to complete wearing a cardiac monitor. We are limited to monitor what given her allergy to the adhesive from the monitoring system. She had a monitor sent to her home which she will bring in to have us place for her. Once we have the data from her monitor we will give her more advice on how to proceed. I also discussed a [employer] with her and she will consider. Patient would prefer another electrophysiologist, which I think is reasonable. - Follow up with alternative EP MD.  - Complete cardiac monitor.  - Consider Cint.

## 2023-07-06 DIAGNOSIS — R11.0 NAUSEA: ICD-10-CM

## 2023-07-06 PROCEDURE — 93244 EXT ECG>48HR<7D REV&INTERPJ: CPT | Performed by: INTERNAL MEDICINE

## 2023-07-06 RX ORDER — ONDANSETRON HYDROCHLORIDE 8 MG/1
8 TABLET, FILM COATED ORAL EVERY 8 HOURS PRN
Qty: 20 TABLET | Refills: 0 | Status: SHIPPED | OUTPATIENT
Start: 2023-07-06

## 2023-07-13 DIAGNOSIS — I47.1 ATRIAL TACHYCARDIA (HCC): Primary | ICD-10-CM

## 2023-07-14 ENCOUNTER — TELEPHONE (OUTPATIENT)
Dept: CARDIOLOGY CLINIC | Age: 38
End: 2023-07-14

## 2023-07-14 NOTE — TELEPHONE ENCOUNTER
I spoke with the patient and she did not wear this monitor for even a day. She is planning to wear another monitor for the third attempt after her insurance gets figured out.

## 2023-08-18 ENCOUNTER — TELEMEDICINE (OUTPATIENT)
Dept: FAMILY MEDICINE CLINIC | Age: 38
End: 2023-08-18
Payer: COMMERCIAL

## 2023-08-18 DIAGNOSIS — R22.0 RIGHT FACIAL SWELLING: Primary | ICD-10-CM

## 2023-08-18 DIAGNOSIS — R11.0 NAUSEA: ICD-10-CM

## 2023-08-18 PROCEDURE — 99213 OFFICE O/P EST LOW 20 MIN: CPT | Performed by: FAMILY MEDICINE

## 2023-08-18 RX ORDER — METHYLPREDNISOLONE 4 MG/1
TABLET ORAL
Qty: 1 KIT | Refills: 0 | Status: SHIPPED | OUTPATIENT
Start: 2023-08-18 | End: 2023-08-24

## 2023-08-18 RX ORDER — DOXYCYCLINE HYCLATE 100 MG
100 TABLET ORAL 2 TIMES DAILY
Qty: 20 TABLET | Refills: 0 | Status: SHIPPED | OUTPATIENT
Start: 2023-08-18 | End: 2023-08-28

## 2023-08-18 RX ORDER — ONDANSETRON HYDROCHLORIDE 8 MG/1
8 TABLET, FILM COATED ORAL EVERY 8 HOURS PRN
Qty: 20 TABLET | Refills: 0 | Status: SHIPPED | OUTPATIENT
Start: 2023-08-18

## 2023-08-18 ASSESSMENT — ENCOUNTER SYMPTOMS
COLOR CHANGE: 1
SHORTNESS OF BREATH: 0

## 2023-08-18 NOTE — PROGRESS NOTES
through a voice recognition software. Priscilla Verma, was evaluated through a synchronous (real-time) audio-video encounter. The patient (or guardian if applicable) is aware that this is a billable service, which includes applicable co-pays. This Virtual Visit was conducted with patient's (and/or legal guardian's) consent. Patient identification was verified, and a caregiver was present when appropriate. The patient was located at Other: her job, in [employer] was located at [facility] [employer]): [facility] [address],  [address] [place], [place] [address]y [facility]         Total time spent for this encounter: Not billed by time    --Dorothy Tsang MD on 8/18/2023 at 11:54 AM    An electronic signature was used to authenticate this note. Pursuant to the emergency declaration under the [place] [address] waiver authority and the [employer] and [employer] Act, this Virtual  Visit was conducted, with patient's consent, to reduce the patient's risk of exposure to COVID-19 and provide continuity of care for an established patient. Services were provided through a video synchronous discussion virtually to substitute for in-person clinic visit.

## 2023-08-23 NOTE — PROGRESS NOTES
Migraine 4/5/23 8/18/23  Haven Garcia, APRN - CNP       Past Medical History:   Diagnosis Date    Anesthesia complication     decreased B/P     Blood clot in vein     Right ovary    Complication of anesthesia     Low blood pressure    Depression     Endometriosis     Factor V deficiency (HCC)     Heart disease     Hyperlipidemia     no meds    Overactive bladder     SVT (supraventricular tachycardia) ([address])     Trauma         Past Surgical History:   Procedure Laterality Date    ATRIAL ABLATION SURGERY  06/13/2019    SVT, Dr. Wynne Enter      X 3    DILATION AND CURETTAGE      DILATION AND CURETTAGE OF UTERUS      HYSTERECTOMY, TOTAL ABDOMINAL (CERVIX REMOVED)      TONSILLECTOMY         Allergies   Allergen Reactions    Dilaudid [Hydromorphone Hcl] Other (See Comments)     tachycardia    Amoxicillin Hives    Bactrim [Sulfamethoxazole-Trimethoprim] Swelling    Ceclor [Cefaclor]      Rash      Cephalexin      swelling    Clindamycin/Lincomycin Swelling     swelling    Phenergan [Promethazine Hcl]      Increased heart rate    Sulfa Antibiotics      swelling    Zithromax [Azithromycin] Other (See Comments)     Stomach pains    Adhesive Tape Itching and Rash    Morphine Palpitations       Social History:  Reviewed. reports that she has never smoked. She has never used smokeless tobacco. She reports that she does not currently use alcohol. She reports that she does not use drugs. Family History:  Reviewed. No family history of SCD.         Physical Examination:  Vitals:    08/24/23 1428   BP: 128/64   Pulse: 79   SpO2: 99%      Wt Readings from Last 3 Encounters:   08/24/23 148 lb (67.1 kg)   06/20/23 147 lb 12.8 oz (67 kg)   04/05/23 145 lb (65.8 kg)     No acute distress  Moist oral mucosa, nonicteric conjunctiva  Chest clear, nonlabored, no rhonchi or wheeze  Heart is regular rate, regular rhythm, no murmurs, no lower extremity swelling, no jugular venous distention  Abdomen is rounded,
diastolic filling pressure. Systolic pulmonary artery pressure (SPAP) is normal estimated at 21 mmHg   (Right atrial pressure of 3 mmHg). No significant valvular heart disease. I independently reviewed relevant and available cardiac diagnostic tests ECG, CXR, Echo, Stress test, Device interrogation, Holter, CT scan. Outside medical records via Care everywhere reviewed and summarized in H&P above. Assessment/Plan:  SVT  - s/p RFA of AT (2019)  - no documented recurrence per monitors in 2019, 2020, and 2023    2. Palpitations    ***    ***    NOTE: This report was transcribed using voice recognition software. Every effort was made to ensure accuracy, however, inadvertent computerized transcription errors may be present.      Kary Lorenzo MD  [facility]   Office: [phone]  Fax: [phone]

## 2023-08-24 ENCOUNTER — OFFICE VISIT (OUTPATIENT)
Dept: CARDIOLOGY CLINIC | Age: 38
End: 2023-08-24

## 2023-08-24 VITALS
HEART RATE: 79 BPM | OXYGEN SATURATION: 99 % | SYSTOLIC BLOOD PRESSURE: 128 MMHG | HEIGHT: 60 IN | DIASTOLIC BLOOD PRESSURE: 64 MMHG | BODY MASS INDEX: 29.06 KG/M2 | WEIGHT: 148 LBS

## 2023-08-24 DIAGNOSIS — I47.1 ATRIAL TACHYCARDIA (HCC): Primary | ICD-10-CM

## 2023-08-24 DIAGNOSIS — R00.2 PALPITATIONS: ICD-10-CM

## 2023-08-24 NOTE — PATIENT INSTRUCTIONS
Call for monitor placement when you are symptomatic. Note you need to wear a Medily[employer] monitor.    Discussed ILR   Follow up with me in 1 year

## 2023-09-13 DIAGNOSIS — R11.0 NAUSEA: ICD-10-CM

## 2023-09-13 RX ORDER — ONDANSETRON HYDROCHLORIDE 8 MG/1
8 TABLET, FILM COATED ORAL EVERY 8 HOURS PRN
Qty: 20 TABLET | Refills: 0 | Status: SHIPPED | OUTPATIENT
Start: 2023-09-13

## 2023-10-06 ENCOUNTER — OFFICE VISIT (OUTPATIENT)
Dept: FAMILY MEDICINE CLINIC | Age: 38
End: 2023-10-06

## 2023-10-06 VITALS
HEART RATE: 118 BPM | OXYGEN SATURATION: 98 % | WEIGHT: 147.6 LBS | DIASTOLIC BLOOD PRESSURE: 82 MMHG | SYSTOLIC BLOOD PRESSURE: 128 MMHG | HEIGHT: 60 IN | BODY MASS INDEX: 28.98 KG/M2

## 2023-10-06 DIAGNOSIS — M25.50 PAIN, JOINT, MULTIPLE SITES: ICD-10-CM

## 2023-10-06 DIAGNOSIS — R10.9 RIGHT FLANK PAIN: Primary | ICD-10-CM

## 2023-10-06 DIAGNOSIS — M79.10 MYALGIA: ICD-10-CM

## 2023-10-06 LAB
ALBUMIN SERPL-MCNC: 4.3 G/DL (ref 3.4–5)
ALBUMIN/GLOB SERPL: 1.5 {RATIO} (ref 1.1–2.2)
ALP SERPL-CCNC: 98 U/L (ref 40–129)
ALT SERPL-CCNC: 106 U/L (ref 10–40)
ANION GAP SERPL CALCULATED.3IONS-SCNC: 8 MMOL/L (ref 3–16)
AST SERPL-CCNC: 98 U/L (ref 15–37)
BASOPHILS # BLD: 0.1 K/UL (ref 0–0.2)
BASOPHILS NFR BLD: 1 %
BILIRUB SERPL-MCNC: 0.4 MG/DL (ref 0–1)
BILIRUBIN, POC: NEGATIVE
BLOOD URINE, POC: NEGATIVE
BUN SERPL-MCNC: 9 MG/DL (ref 7–20)
CALCIUM SERPL-MCNC: 9.4 MG/DL (ref 8.3–10.6)
CHLORIDE SERPL-SCNC: 102 MMOL/L (ref 99–110)
CLARITY, POC: CLEAR
CO2 SERPL-SCNC: 26 MMOL/L (ref 21–32)
COLOR, POC: YELLOW
CREAT SERPL-MCNC: 0.7 MG/DL (ref 0.6–1.1)
CRP SERPL-MCNC: 10.8 MG/L (ref 0–5.1)
DEPRECATED RDW RBC AUTO: 13.2 % (ref 12.4–15.4)
EOSINOPHIL # BLD: 0 K/UL (ref 0–0.6)
EOSINOPHIL NFR BLD: 0 %
ERYTHROCYTE [SEDIMENTATION RATE] IN BLOOD BY WESTERGREN METHOD: 4 MM/HR (ref 0–20)
GFR SERPLBLD CREATININE-BSD FMLA CKD-EPI: >60 ML/MIN/{1.73_M2}
GLUCOSE SERPL-MCNC: 90 MG/DL (ref 70–99)
GLUCOSE URINE, POC: NEGATIVE
HCT VFR BLD AUTO: 36 % (ref 36–48)
HGB BLD-MCNC: 12.7 G/DL (ref 12–16)
KETONES, POC: NEGATIVE
LEUKOCYTE EST, POC: NEGATIVE
LYMPHOCYTES # BLD: 2.3 K/UL (ref 1–5.1)
LYMPHOCYTES NFR BLD: 34 %
MCH RBC QN AUTO: 31 PG (ref 26–34)
MCHC RBC AUTO-ENTMCNC: 35.3 G/DL (ref 31–36)
MCV RBC AUTO: 87.9 FL (ref 80–100)
MONOCYTES # BLD: 0.5 K/UL (ref 0–1.3)
MONOCYTES NFR BLD: 8 %
NEUTROPHILS # BLD: 3.8 K/UL (ref 1.7–7.7)
NEUTROPHILS NFR BLD: 52 %
NEUTS BAND NFR BLD MANUAL: 5 % (ref 0–7)
NITRITE, POC: NEGATIVE
NRBC BLD-RTO: 4 /100 WBC
PH, POC: 7
PLATELET # BLD AUTO: 139 K/UL (ref 135–450)
PLATELET BLD QL SMEAR: ADEQUATE
PMV BLD AUTO: 8.7 FL (ref 5–10.5)
POTASSIUM SERPL-SCNC: 4.2 MMOL/L (ref 3.5–5.1)
PROT SERPL-MCNC: 7.1 G/DL (ref 6.4–8.2)
PROTEIN, POC: NEGATIVE
RBC # BLD AUTO: 4.09 M/UL (ref 4–5.2)
RBC MORPH BLD: NORMAL
RHEUMATOID FACT SER IA-ACNC: 11 IU/ML
SLIDE REVIEW: ABNORMAL
SODIUM SERPL-SCNC: 136 MMOL/L (ref 136–145)
SPECIFIC GRAVITY, POC: 1.03
UROBILINOGEN, POC: 0.2
WBC # BLD AUTO: 6.7 K/UL (ref 4–11)

## 2023-10-06 NOTE — PROGRESS NOTES
Auto Differential  - POCT Urinalysis no Micro  - Culture, Urine  - US ABDOMEN LIMITED; Future    2. Pain, joint, multiple sites  Diffuse myalgias and arthralgias noted. This is been going on for some time. Check labs as below  - Cyclic Citrul Peptide Antibody, IgG  - Rheumatoid Factor  - Sedimentation Rate  - C-Reactive Protein  - MARIA R Reflex to Antibody Cascade    3. Myalgia  See above. Significant fatigue noted as well. This document was prepared by a combination of typing and transcription through a voice recognition software.

## 2023-10-06 NOTE — RESULT ENCOUNTER NOTE
UA negative. Labs as done today. Imaging as discussed as well. Will decide how to proceed depending on results.

## 2023-10-07 ENCOUNTER — HOSPITAL ENCOUNTER (OUTPATIENT)
Dept: ULTRASOUND IMAGING | Age: 38
Discharge: HOME | End: 2023-10-07
Payer: COMMERCIAL

## 2023-10-07 DIAGNOSIS — R10.9 RIGHT FLANK PAIN: ICD-10-CM

## 2023-10-07 LAB
ANA SER QL IA: NEGATIVE
BACTERIA UR CULT: NORMAL
CCP IGG SERPL-ACNC: <0.5 U/ML (ref 0–2.9)

## 2023-10-07 PROCEDURE — 76705 ECHO EXAM OF ABDOMEN: CPT

## 2023-10-09 DIAGNOSIS — R10.9 RIGHT FLANK PAIN: Primary | ICD-10-CM

## 2023-10-09 DIAGNOSIS — R79.82 CRP ELEVATED: ICD-10-CM

## 2023-10-09 DIAGNOSIS — R10.9 RIGHT FLANK PAIN: ICD-10-CM

## 2023-10-09 DIAGNOSIS — R10.11 RIGHT UPPER QUADRANT PAIN: Primary | ICD-10-CM

## 2023-10-09 LAB
HAV IGM SERPL QL IA: NORMAL
HBV CORE IGM SERPL QL IA: NORMAL
HBV SURFACE AG SERPL QL IA: NORMAL
HCV AB SERPL QL IA: NORMAL

## 2023-10-13 ENCOUNTER — HOSPITAL ENCOUNTER (OUTPATIENT)
Age: 38
Discharge: HOME | End: 2023-10-13
Payer: COMMERCIAL

## 2023-10-13 DIAGNOSIS — R63.4 WEIGHT LOSS, UNINTENTIONAL: ICD-10-CM

## 2023-10-13 DIAGNOSIS — K64.9 HEMORRHOIDS, UNSPECIFIED HEMORRHOID TYPE: ICD-10-CM

## 2023-10-13 DIAGNOSIS — K59.00 CONSTIPATION, UNSPECIFIED CONSTIPATION TYPE: Primary | ICD-10-CM

## 2023-10-13 LAB
ALBUMIN SERPL-MCNC: 4.3 G/DL (ref 3.4–5)
ALP SERPL-CCNC: 83 U/L (ref 40–129)
ALT SERPL-CCNC: 50 U/L (ref 10–40)
AST SERPL-CCNC: 28 U/L (ref 15–37)
BILIRUB DIRECT SERPL-MCNC: <0.2 MG/DL (ref 0–0.3)
BILIRUB INDIRECT SERPL-MCNC: ABNORMAL MG/DL (ref 0–1)
BILIRUB SERPL-MCNC: 0.5 MG/DL (ref 0–1)
FERRITIN SERPL IA-MCNC: 249.7 NG/ML (ref 15–150)
IGG SERPL-MCNC: 1228 MG/DL (ref 700–1600)
IRON SATN MFR SERPL: 20 % (ref 15–50)
IRON SERPL-MCNC: 56 UG/DL (ref 37–145)
PROT SERPL-MCNC: 6.8 G/DL (ref 6.4–8.2)
TIBC SERPL-MCNC: 275 UG/DL (ref 260–445)

## 2023-10-13 PROCEDURE — 86664 EPSTEIN-BARR NUCLEAR ANTIGEN: CPT

## 2023-10-13 PROCEDURE — 36415 COLL VENOUS BLD VENIPUNCTURE: CPT

## 2023-10-13 PROCEDURE — 82784 ASSAY IGA/IGD/IGG/IGM EACH: CPT

## 2023-10-13 PROCEDURE — 86665 EPSTEIN-BARR CAPSID VCA: CPT

## 2023-10-13 PROCEDURE — 80076 HEPATIC FUNCTION PANEL: CPT

## 2023-10-13 PROCEDURE — 86663 EPSTEIN-BARR ANTIBODY: CPT

## 2023-10-13 PROCEDURE — 86015 ACTIN ANTIBODY EACH: CPT

## 2023-10-13 PROCEDURE — 82728 ASSAY OF FERRITIN: CPT

## 2023-10-13 PROCEDURE — 82390 ASSAY OF CERULOPLASMIN: CPT

## 2023-10-13 PROCEDURE — 82103 ALPHA-1-ANTITRYPSIN TOTAL: CPT

## 2023-10-13 PROCEDURE — 82104 ALPHA-1-ANTITRYPSIN PHENO: CPT

## 2023-10-13 PROCEDURE — 83540 ASSAY OF IRON: CPT

## 2023-10-13 PROCEDURE — 83516 IMMUNOASSAY NONANTIBODY: CPT

## 2023-10-13 PROCEDURE — 83550 IRON BINDING TEST: CPT

## 2023-10-15 LAB
EBV EA-D IGG SER-ACNC: <5 U/ML (ref 0–10.9)
EBV NA IGG SER IA-ACNC: 65.9 U/ML (ref 0–21.9)
EBV VCA IGG SER IA-ACNC: >750 U/ML (ref 0–21.9)
EBV VCA IGM SER IA-ACNC: 66.2 U/ML (ref 0–43.9)
SMA IGG SER-ACNC: 25 UNITS (ref 0–19)
SMOOTH MUSCLE IGG TITR SER: ABNORMAL {TITER}

## 2023-10-17 LAB
A1AT PHENOTYP SERPL-IMP: NORMAL
A1AT SERPL-MCNC: 167 MG/DL (ref 90–200)
CERULOPLASMIN SERPL-MCNC: 35 MG/DL (ref 16–45)

## 2023-10-23 LAB — MITOCHONDRIA M2 AB SER IA-ACNC: 1 U/ML (ref 0–4)

## 2023-10-27 LAB
HAV AB SERPL IA-ACNC: NEGATIVE
HBV SURFACE AB TITR SER: NON REACTIVE {TITER}
HEPATITIS B CORE TOTAL ANTIBODY: NEGATIVE
HEPATITIS B SURF AG,XHBAGS: NEGATIVE
HEPATITIS C ANTIBODY: NON REACTIVE
HEPATITIS INTERPRETATION:: NORMAL
HIV 1/2 ANTIBODY: NON REACTIVE
INTERPRETATION: NORMAL
Lab: NORMAL
MEASLES ANTIBODY IGG: 176 AU/ML
MUV IGG SER QL: 80.2 AU/ML
RUBV IGG SER QL: 21.6 INDEX

## 2023-11-07 DIAGNOSIS — F41.9 ANXIETY: ICD-10-CM

## 2023-11-07 DIAGNOSIS — R11.0 NAUSEA: ICD-10-CM

## 2023-11-07 RX ORDER — HYDROXYZINE PAMOATE 50 MG/1
50 CAPSULE ORAL 2 TIMES DAILY PRN
Qty: 45 CAPSULE | Refills: 1 | Status: SHIPPED | OUTPATIENT
Start: 2023-11-07

## 2023-11-07 RX ORDER — ONDANSETRON HYDROCHLORIDE 8 MG/1
8 TABLET, FILM COATED ORAL EVERY 8 HOURS PRN
Qty: 20 TABLET | Refills: 0 | Status: SHIPPED | OUTPATIENT
Start: 2023-11-07

## 2024-01-04 ENCOUNTER — HOSPITAL ENCOUNTER (OUTPATIENT)
Age: 39
Discharge: HOME | End: 2024-01-04
Payer: COMMERCIAL

## 2024-01-04 LAB
INR PPP: 0.97 (ref 0.84–1.16)
PROTHROMBIN TIME: 12.9 SEC (ref 11.5–14.8)

## 2024-01-04 PROCEDURE — 86038 ANTINUCLEAR ANTIBODIES: CPT

## 2024-01-04 PROCEDURE — 82105 ALPHA-FETOPROTEIN SERUM: CPT

## 2024-01-04 PROCEDURE — 83540 ASSAY OF IRON: CPT

## 2024-01-04 PROCEDURE — 85025 COMPLETE CBC W/AUTO DIFF WBC: CPT

## 2024-01-04 PROCEDURE — 80053 COMPREHEN METABOLIC PANEL: CPT

## 2024-01-04 PROCEDURE — 85610 PROTHROMBIN TIME: CPT

## 2024-01-04 PROCEDURE — 86015 ACTIN ANTIBODY EACH: CPT

## 2024-01-04 PROCEDURE — 83550 IRON BINDING TEST: CPT

## 2024-01-04 PROCEDURE — 82104 ALPHA-1-ANTITRYPSIN PHENO: CPT

## 2024-01-04 PROCEDURE — 82103 ALPHA-1-ANTITRYPSIN TOTAL: CPT

## 2024-01-04 PROCEDURE — 83516 IMMUNOASSAY NONANTIBODY: CPT

## 2024-01-04 PROCEDURE — 82977 ASSAY OF GGT: CPT

## 2024-01-04 PROCEDURE — 82728 ASSAY OF FERRITIN: CPT

## 2024-01-04 PROCEDURE — 80074 ACUTE HEPATITIS PANEL: CPT

## 2024-01-05 LAB
ALBUMIN SERPL-MCNC: 4.3 G/DL (ref 3.4–5)
ALBUMIN/GLOB SERPL: 1.4 {RATIO} (ref 1.1–2.2)
ALP SERPL-CCNC: 81 U/L (ref 40–129)
ALT SERPL-CCNC: 18 U/L (ref 10–40)
ANA SER QL IA: NEGATIVE
ANION GAP SERPL CALCULATED.3IONS-SCNC: 10 MMOL/L (ref 3–16)
AST SERPL-CCNC: 19 U/L (ref 15–37)
BASOPHILS # BLD: 0.1 K/UL (ref 0–0.2)
BASOPHILS NFR BLD: 0.8 %
BILIRUB SERPL-MCNC: 0.3 MG/DL (ref 0–1)
BUN SERPL-MCNC: 8 MG/DL (ref 7–20)
CALCIUM SERPL-MCNC: 9.2 MG/DL (ref 8.3–10.6)
CHLORIDE SERPL-SCNC: 101 MMOL/L (ref 99–110)
CO2 SERPL-SCNC: 27 MMOL/L (ref 21–32)
CREAT SERPL-MCNC: 0.6 MG/DL (ref 0.6–1.1)
DEPRECATED RDW RBC AUTO: 12.1 % (ref 12.4–15.4)
EOSINOPHIL # BLD: 0.1 K/UL (ref 0–0.6)
EOSINOPHIL NFR BLD: 1.6 %
FERRITIN SERPL IA-MCNC: 79.6 NG/ML (ref 15–150)
GFR SERPLBLD CREATININE-BSD FMLA CKD-EPI: >60 ML/MIN/{1.73_M2}
GGT SERPL-CCNC: 11 U/L (ref 5–36)
GLUCOSE SERPL-MCNC: 94 MG/DL (ref 70–99)
HCT VFR BLD AUTO: 38.9 % (ref 36–48)
HGB BLD-MCNC: 13.7 G/DL (ref 12–16)
IRON SATN MFR SERPL: 27 % (ref 15–50)
IRON SERPL-MCNC: 85 UG/DL (ref 37–145)
LYMPHOCYTES # BLD: 2.5 K/UL (ref 1–5.1)
LYMPHOCYTES NFR BLD: 34.1 %
MCH RBC QN AUTO: 30.7 PG (ref 26–34)
MCHC RBC AUTO-ENTMCNC: 35.2 G/DL (ref 31–36)
MCV RBC AUTO: 87.3 FL (ref 80–100)
MONOCYTES # BLD: 0.4 K/UL (ref 0–1.3)
MONOCYTES NFR BLD: 5.7 %
NEUTROPHILS # BLD: 4.2 K/UL (ref 1.7–7.7)
NEUTROPHILS NFR BLD: 57.8 %
PLATELET # BLD AUTO: 252 K/UL (ref 135–450)
PMV BLD AUTO: 9 FL (ref 5–10.5)
POTASSIUM SERPL-SCNC: 4.1 MMOL/L (ref 3.5–5.1)
PROT SERPL-MCNC: 7.4 G/DL (ref 6.4–8.2)
RBC # BLD AUTO: 4.46 M/UL (ref 4–5.2)
SODIUM SERPL-SCNC: 138 MMOL/L (ref 136–145)
TIBC SERPL-MCNC: 315 UG/DL (ref 260–445)
WBC # BLD AUTO: 7.3 K/UL (ref 4–11)

## 2024-01-07 LAB — SMA IGG SER-ACNC: 21 UNITS (ref 0–19)

## 2024-01-08 LAB
A1AT PHENOTYP SERPL-IMP: NORMAL
A1AT SERPL-MCNC: 151 MG/DL (ref 90–200)
SMA IGG SER-ACNC: 21 UNITS (ref 0–19)
SMOOTH MUSCLE IGG TITR SER: ABNORMAL {TITER}

## 2024-01-12 LAB — MITOCHONDRIA M2 AB SER IA-ACNC: 0.8 U/ML (ref 0–4)

## 2024-01-24 DIAGNOSIS — R11.0 NAUSEA: ICD-10-CM

## 2024-01-25 LAB — AFP-TM SERPL-MCNC: 2.6 UG/L

## 2024-01-25 RX ORDER — ONDANSETRON HYDROCHLORIDE 8 MG/1
8 TABLET, FILM COATED ORAL EVERY 8 HOURS PRN
Qty: 20 TABLET | Refills: 0 | Status: SHIPPED | OUTPATIENT
Start: 2024-01-25

## 2024-02-13 ENCOUNTER — PRE-PROCEDURE TELEPHONE (OUTPATIENT)
Dept: INTERVENTIONAL RADIOLOGY/VASCULAR | Age: 39
End: 2024-02-13

## 2024-02-13 NOTE — PROGRESS NOTES
Pre Procedure follow up phone call completed with Patient  Procedure order in chart                    yes,   Scheduling note in chart                     yes,  Orders placed (IR generic order set)  yes,   Medication Reconciliation complete     yes,   Ancillary department/ Rep aware.         yes,       Patient reminded of arrival time to same day surgery entrance, NPO @ [facility], blood thinners instructed to hold, if patient is taking and the need for a  home. Patient aware of estimated recovery time of 3-4 hours.  Questions answered.

## 2024-02-15 ENCOUNTER — HOSPITAL ENCOUNTER (OUTPATIENT)
Dept: CT IMAGING | Age: 39
Discharge: HOME | End: 2024-02-15
Payer: COMMERCIAL

## 2024-02-15 ENCOUNTER — HOSPITAL ENCOUNTER (OUTPATIENT)
Age: 39
Discharge: HOME | End: 2024-02-15
Payer: COMMERCIAL

## 2024-02-15 VITALS
RESPIRATION RATE: 16 BRPM | DIASTOLIC BLOOD PRESSURE: 62 MMHG | HEART RATE: 87 BPM | SYSTOLIC BLOOD PRESSURE: 94 MMHG | BODY MASS INDEX: 28.47 KG/M2 | WEIGHT: 145 LBS | HEIGHT: 60 IN | TEMPERATURE: 97.9 F | OXYGEN SATURATION: 99 %

## 2024-02-15 DIAGNOSIS — R79.89 ELEVATED LIVER FUNCTION TESTS: ICD-10-CM

## 2024-02-15 LAB
DEPRECATED RDW RBC AUTO: 12.8 % (ref 12.4–15.4)
HCG UR QL: NEGATIVE
HCT VFR BLD AUTO: 39.8 % (ref 36–48)
HGB BLD-MCNC: 13.7 G/DL (ref 12–16)
INR PPP: 0.91 (ref 0.84–1.16)
MCH RBC QN AUTO: 30.6 PG (ref 26–34)
MCHC RBC AUTO-ENTMCNC: 34.4 G/DL (ref 31–36)
MCV RBC AUTO: 89.1 FL (ref 80–100)
PLATELET # BLD AUTO: 225 K/UL (ref 135–450)
PMV BLD AUTO: 8.3 FL (ref 5–10.5)
PROTHROMBIN TIME: 12.3 SEC (ref 11.5–14.8)
RBC # BLD AUTO: 4.47 M/UL (ref 4–5.2)
WBC # BLD AUTO: 7.5 K/UL (ref 4–11)

## 2024-02-15 PROCEDURE — 6360000002 HC RX W HCPCS: Performed by: STUDENT IN AN ORGANIZED HEALTH CARE EDUCATION/TRAINING PROGRAM

## 2024-02-15 PROCEDURE — 77012 CT SCAN FOR NEEDLE BIOPSY: CPT

## 2024-02-15 PROCEDURE — 85610 PROTHROMBIN TIME: CPT

## 2024-02-15 PROCEDURE — 84703 CHORIONIC GONADOTROPIN ASSAY: CPT

## 2024-02-15 PROCEDURE — 85027 COMPLETE CBC AUTOMATED: CPT

## 2024-02-15 PROCEDURE — 36415 COLL VENOUS BLD VENIPUNCTURE: CPT

## 2024-02-15 RX ORDER — MIDAZOLAM HYDROCHLORIDE 1 MG/ML
INJECTION INTRAMUSCULAR; INTRAVENOUS PRN
Status: COMPLETED | OUTPATIENT
Start: 2024-02-15 | End: 2024-02-15

## 2024-02-15 RX ORDER — SODIUM CHLORIDE 9 MG/ML
INJECTION, SOLUTION INTRAVENOUS PRN
Status: DISCONTINUED | OUTPATIENT
Start: 2024-02-15 | End: 2024-02-16 | Stop reason: HOSPADM

## 2024-02-15 RX ORDER — FENTANYL CITRATE 50 UG/ML
INJECTION, SOLUTION INTRAMUSCULAR; INTRAVENOUS PRN
Status: COMPLETED | OUTPATIENT
Start: 2024-02-15 | End: 2024-02-15

## 2024-02-15 RX ORDER — OXYCODONE HYDROCHLORIDE AND ACETAMINOPHEN 5; 325 MG/1; MG/1
1 TABLET ORAL EVERY 4 HOURS PRN
Status: DISCONTINUED | OUTPATIENT
Start: 2024-02-15 | End: 2024-02-16 | Stop reason: HOSPADM

## 2024-02-15 RX ORDER — ONDANSETRON 2 MG/ML
4 INJECTION INTRAMUSCULAR; INTRAVENOUS EVERY 8 HOURS PRN
Status: DISCONTINUED | OUTPATIENT
Start: 2024-02-15 | End: 2024-02-16 | Stop reason: HOSPADM

## 2024-02-15 RX ORDER — SODIUM CHLORIDE 0.9 % (FLUSH) 0.9 %
5-40 SYRINGE (ML) INJECTION PRN
Status: DISCONTINUED | OUTPATIENT
Start: 2024-02-15 | End: 2024-02-16 | Stop reason: HOSPADM

## 2024-02-15 RX ORDER — SODIUM CHLORIDE 0.9 % (FLUSH) 0.9 %
5-40 SYRINGE (ML) INJECTION EVERY 12 HOURS SCHEDULED
Status: DISCONTINUED | OUTPATIENT
Start: 2024-02-15 | End: 2024-02-16 | Stop reason: HOSPADM

## 2024-02-15 RX ORDER — ACETAMINOPHEN 325 MG/1
650 TABLET ORAL EVERY 4 HOURS PRN
Status: DISCONTINUED | OUTPATIENT
Start: 2024-02-15 | End: 2024-02-16 | Stop reason: HOSPADM

## 2024-02-15 RX ORDER — OXYCODONE HYDROCHLORIDE AND ACETAMINOPHEN 5; 325 MG/1; MG/1
2 TABLET ORAL EVERY 4 HOURS PRN
Status: DISCONTINUED | OUTPATIENT
Start: 2024-02-15 | End: 2024-02-16 | Stop reason: HOSPADM

## 2024-02-15 RX ADMIN — MIDAZOLAM 1 MG: 1 INJECTION INTRAMUSCULAR; INTRAVENOUS at 09:00

## 2024-02-15 RX ADMIN — FENTANYL CITRATE 50 MCG: 50 INJECTION INTRAMUSCULAR; INTRAVENOUS at 09:00

## 2024-02-15 RX ADMIN — FENTANYL CITRATE 50 MCG: 50 INJECTION INTRAMUSCULAR; INTRAVENOUS at 08:53

## 2024-02-15 RX ADMIN — MIDAZOLAM 1 MG: 1 INJECTION INTRAMUSCULAR; INTRAVENOUS at 08:54

## 2024-02-15 NOTE — OR NURSING
Image guided random liver biopsy completed by Dr. Salazar. Pt tolerated procedure without any signs or symptoms of distress. Vital signs stable. Report given  to [facility] Juli HARDY. Pt transported back to [facility] in stable condition via stretcher.     Total Biopsy: 3  Received: Versed: 2 mg       Fentanyl: 100 mcg  Bandage to R. abd that is clean dry and intact.   Patient to lay on R. side for 2 hours.     Vital Signs  Vitals:    02/15/24 0903   BP: (!) 108/55   Pulse: 72   Resp: 14   Temp:    SpO2: 100%    (vital signs in table format)    Post Twan  2 - Able to move 4 extremities voluntarily on command  2 - BP+/- 20mmHg of normal  2 - Fully awake  2 - Able to maintain oxygen saturation >92% on room air  2 - Able to breathe deeply and cough freely

## 2024-02-15 NOTE — PRE SEDATION
APRN - CNP   cetirizine (ZYRTEC) 10 MG tablet Take 1 tablet by mouth daily    Provider, MD Josselyn     Coumadin Use Last 7 Days:  no  Antiplatelet drug therapy use last 7 days: no  Other anticoagulant use last 7 days: no  Additional Medication Information:  N/A      Pre-Sedation Documentation and Exam:   I have reviewed the patient's history and review of systems.    Mallampati Airway Assessment:  normal, Mallampati Class I - (soft palate, fauces, uvula & anterior/posterior tonsillar pillars are visible)    Prior History of Anesthesia Complications:   none    ASA Classification:  Class 1 - A normal healthy patient    Sedation/ Anesthesia Plan:   intravenous sedation    Medications Planned:   midazolam (Versed) intravenously and fentanyl intravenously    Patient is an appropriate candidate for plan of sedation: yes    Electronically signed by Dallin Salazar DO on 2/15/2024 at 8:39 AM

## 2024-02-15 NOTE — OR NURSING
Pt arrived for image guided random liver biopsy. Procedure explained including the risk and benefits of the procedure. All questions answered. Pt verbalizes understanding of the of procedure and states no more questions. Consent signed. Vital signs stable, labs, allergies, medications, and code status reviewed. No contraindications noted.     Vitals:    02/15/24 0845   BP: (!) 131/98   Pulse: 87   Resp: 14   Temp:    SpO2: 98%    (vital signs in table format)    Twan Score  2 - Able to move 4 extremities voluntarily on command  2 - BP+/- 20mmHg of normal  2 - Fully awake  2 - Able to maintain oxygen saturation >92% on room air  2 - Able to breathe deeply and cough freely    Allergies  Dilaudid [hydromorphone hcl], Amoxicillin, Bactrim [sulfamethoxazole-trimethoprim], Ceclor [cefaclor], Cephalexin, Clindamycin/lincomycin, Phenergan [promethazine hcl], Sulfa antibiotics, Zithromax [azithromycin], Adhesive tape, and Morphine    Labs  Lab Results   Component Value Date    INR 0.91 02/15/2024    PROTIME 12.3 02/15/2024       Lab Results   Component Value Date    CREATININE 0.6 01/04/2024    BUN 8 01/04/2024    GFR >90 01/26/2017    GFR >90 01/26/2017     01/04/2024    K 4.1 01/04/2024     01/04/2024    CO2 27 01/04/2024       Lab Results   Component Value Date    WBC 7.5 02/15/2024    HGB 13.7 02/15/2024    HCT 39.8 02/15/2024    MCV 89.1 02/15/2024     02/15/2024

## 2024-02-15 NOTE — BRIEF OP NOTE
Brief Postoperative Note    Patient: Priscilla Verma  YOB: 1985  MRN: [id number]      Pre-operative Diagnosis: Elevated LFTs    Post-operative Diagnosis: Same    Procedure: CT guided non targeted liver biopsy    Anesthesia: Local and Moderate Sedation    Surgeons/Assistants: Dallin Salazar DO    Estimated Blood Loss: less than 50     Complications: None    Specimens: Was Obtained: three 18-G cores    Findings:   Successful CT guided liver biopsy via the right inferior hepatic lobe.  Post biopsy CT images demonstrated no complication.    NPO, D/C in 2 hours.       Dallin Salazar DO  2/15/2024, 9:06 AM  Interventional Radiology  [phone] ([phone])

## 2024-02-15 NOTE — PROGRESS NOTES
Patient resting with eyes closed, denies any needs. BP 81/51, new bag of IVF hung and running open at this time. Family at bedside. Will continue to monitor.

## 2024-02-15 NOTE — PROGRESS NOTES
Discharge instructions given to patient's mother Kenya at this time, both state understanding. IV removed and patient is getting dressed at this time.

## 2024-02-21 ENCOUNTER — TRANSCRIBE ORDERS (OUTPATIENT)
Dept: ADMINISTRATIVE | Age: 39
End: 2024-02-21

## 2024-02-21 ENCOUNTER — OFFICE VISIT (OUTPATIENT)
Dept: FAMILY MEDICINE CLINIC | Age: 39
End: 2024-02-21
Payer: COMMERCIAL

## 2024-02-21 VITALS
BODY MASS INDEX: 30.27 KG/M2 | WEIGHT: 154.2 LBS | DIASTOLIC BLOOD PRESSURE: 78 MMHG | HEART RATE: 93 BPM | OXYGEN SATURATION: 97 % | SYSTOLIC BLOOD PRESSURE: 112 MMHG | HEIGHT: 60 IN

## 2024-02-21 DIAGNOSIS — R10.11 RUQ ABDOMINAL PAIN: ICD-10-CM

## 2024-02-21 DIAGNOSIS — R10.9 RIGHT FLANK PAIN: ICD-10-CM

## 2024-02-21 DIAGNOSIS — Z98.890 HISTORY OF BIOPSY: Primary | ICD-10-CM

## 2024-02-21 DIAGNOSIS — R10.11 RUQ PAIN: Primary | ICD-10-CM

## 2024-02-21 LAB
ALBUMIN SERPL-MCNC: 4.7 G/DL
ALP BLD-CCNC: 64 U/L
ALT SERPL-CCNC: 23 U/L
ANION GAP SERPL CALCULATED.3IONS-SCNC: 11 MMOL/L
AST SERPL-CCNC: 27 U/L
BASOPHILS ABSOLUTE: NORMAL
BASOPHILS RELATIVE PERCENT: 0.8 %
BILIRUB SERPL-MCNC: 0.5 MG/DL (ref 0.1–1.4)
BUN BLDV-MCNC: 11 MG/DL
CALCIUM SERPL-MCNC: 10 MG/DL
CHLORIDE BLD-SCNC: 103 MMOL/L
CO2: 28 MMOL/L
CREAT SERPL-MCNC: 0.8 MG/DL
EGFR: 81
EOSINOPHILS ABSOLUTE: NORMAL
EOSINOPHILS RELATIVE PERCENT: 1.4 %
GLUCOSE BLD-MCNC: 89 MG/DL
HCT VFR BLD CALC: 40.9 % (ref 36–46)
HEMOGLOBIN: 13.9 G/DL (ref 12–16)
LYMPHOCYTES ABSOLUTE: 2.6 /ΜL
LYMPHOCYTES RELATIVE PERCENT: 32.8 %
MCH RBC QN AUTO: 30.5 PG
MCHC RBC AUTO-ENTMCNC: 34 G/DL
MCV RBC AUTO: 89.9 FL
MONOCYTES ABSOLUTE: NORMAL
MONOCYTES RELATIVE PERCENT: 6.5 %
NEUTROPHILS ABSOLUTE: 4.6 /ΜL
NEUTROPHILS RELATIVE PERCENT: 58.1 %
PLATELET # BLD: 239 K/ΜL
PMV BLD AUTO: NORMAL FL
POTASSIUM SERPL-SCNC: 4 MMOL/L
RBC # BLD: 4.55 10^6/ΜL
SODIUM BLD-SCNC: 138 MMOL/L
TOTAL PROTEIN: 8.4
WBC # BLD: 7.8 10^3/ML

## 2024-02-21 PROCEDURE — 1036F TOBACCO NON-USER: CPT | Performed by: FAMILY MEDICINE

## 2024-02-21 PROCEDURE — G8427 DOCREV CUR MEDS BY ELIG CLIN: HCPCS | Performed by: FAMILY MEDICINE

## 2024-02-21 PROCEDURE — G8484 FLU IMMUNIZE NO ADMIN: HCPCS | Performed by: FAMILY MEDICINE

## 2024-02-21 PROCEDURE — 99213 OFFICE O/P EST LOW 20 MIN: CPT | Performed by: FAMILY MEDICINE

## 2024-02-21 PROCEDURE — G8417 CALC BMI ABV UP PARAM F/U: HCPCS | Performed by: FAMILY MEDICINE

## 2024-02-21 ASSESSMENT — PATIENT HEALTH QUESTIONNAIRE - PHQ9
SUM OF ALL RESPONSES TO PHQ9 QUESTIONS 1 & 2: 0
7. TROUBLE CONCENTRATING ON THINGS, SUCH AS READING THE NEWSPAPER OR WATCHING TELEVISION: 1
3. TROUBLE FALLING OR STAYING ASLEEP: 3
SUM OF ALL RESPONSES TO PHQ QUESTIONS 1-9: 10
SUM OF ALL RESPONSES TO PHQ QUESTIONS 1-9: 10
6. FEELING BAD ABOUT YOURSELF - OR THAT YOU ARE A FAILURE OR HAVE LET YOURSELF OR YOUR FAMILY DOWN: 0
SUM OF ALL RESPONSES TO PHQ QUESTIONS 1-9: 10
1. LITTLE INTEREST OR PLEASURE IN DOING THINGS: 0
8. MOVING OR SPEAKING SO SLOWLY THAT OTHER PEOPLE COULD HAVE NOTICED. OR THE OPPOSITE, BEING SO FIGETY OR RESTLESS THAT YOU HAVE BEEN MOVING AROUND A LOT MORE THAN USUAL: 0
9. THOUGHTS THAT YOU WOULD BE BETTER OFF DEAD, OR OF HURTING YOURSELF: 0
2. FEELING DOWN, DEPRESSED OR HOPELESS: 0
4. FEELING TIRED OR HAVING LITTLE ENERGY: 3
SUM OF ALL RESPONSES TO PHQ QUESTIONS 1-9: 10
10. IF YOU CHECKED OFF ANY PROBLEMS, HOW DIFFICULT HAVE THESE PROBLEMS MADE IT FOR YOU TO DO YOUR WORK, TAKE CARE OF THINGS AT HOME, OR GET ALONG WITH OTHER PEOPLE: 0
5. POOR APPETITE OR OVEREATING: 3

## 2024-02-21 ASSESSMENT — ENCOUNTER SYMPTOMS
NAUSEA: 1
CONSTIPATION: 1
ABDOMINAL PAIN: 1

## 2024-02-21 NOTE — PROGRESS NOTES
Chief Complaint   Patient presents with    Abdominal Pain     Right side radiates down hip and into back  Ongoing issue    Nausea       HPI:  Priscilla Verma is a 38 y.o. (: 1985) here today for right sided abdominal pain.  HPI  Has had right sided pain.  Recent endoscopy and then liver bx due to elevated LFT.  Current pain seems to be present since bx.  Has been \"weak and dizzy.\"  Taking zofran more often.  Pain, other sxs present since bx.  Has seemed more swollen as well.      Only small bm yesterday since bx last week.      Patient's medications, allergies, past medical, surgical, social and family histories were reviewed and updated asappropriate.    ROS:  Review of Systems   Constitutional:  Negative for fever.   Gastrointestinal:  Positive for abdominal pain, constipation and nausea.           Prior to Visit Medications    Medication Sig Taking? Authorizing Provider   ondansetron (ZOFRAN) 8 MG tablet Take 1 tablet by mouth every 8 hours as needed for Nausea or Vomiting Yes Dallin Odell MD   hydrOXYzine pamoate (VISTARIL) 50 MG capsule Take 1 capsule by mouth 2 times daily as needed for Anxiety Yes Dallin Odell MD   SUMAtriptan (IMITREX) 50 MG tablet Take 1 tablet by mouth once as needed for Migraine Yes Linwood Cheema, APRN - CNP   cetirizine (ZYRTEC) 10 MG tablet Take 1 tablet by mouth daily Yes Provider, MD Josselyn       Allergies   Allergen Reactions    Dilaudid [Hydromorphone Hcl] Other (See Comments)     tachycardia    Amoxicillin Hives    Bactrim [Sulfamethoxazole-Trimethoprim] Swelling    Ceclor [Cefaclor]      Rash      Cephalexin      swelling    Clindamycin/Lincomycin Swelling     swelling    Phenergan [Promethazine Hcl]      Increased heart rate    Sulfa Antibiotics      swelling    Zithromax [Azithromycin] Other (See Comments)     Stomach pains    Adhesive Tape Itching and Rash    Morphine Palpitations       OBJECTIVE:    /78   Pulse 93   Ht 1.524 m (5')   Wt 69.9

## 2024-02-22 ENCOUNTER — HOSPITAL ENCOUNTER (OUTPATIENT)
Dept: CT IMAGING | Age: 39
Discharge: HOME | End: 2024-02-22
Attending: INTERNAL MEDICINE
Payer: COMMERCIAL

## 2024-02-22 DIAGNOSIS — Z98.890 HISTORY OF BIOPSY: ICD-10-CM

## 2024-02-22 DIAGNOSIS — R10.11 RUQ ABDOMINAL PAIN: ICD-10-CM

## 2024-02-22 PROCEDURE — 74177 CT ABD & PELVIS W/CONTRAST: CPT

## 2024-02-22 PROCEDURE — 6360000004 HC RX CONTRAST MEDICATION: Performed by: INTERNAL MEDICINE

## 2024-02-22 RX ADMIN — IOMEPROL INJECTION 75 ML: 714 INJECTION, SOLUTION INTRAVASCULAR at 09:57

## 2024-02-28 DIAGNOSIS — R11.0 NAUSEA: ICD-10-CM

## 2024-02-28 RX ORDER — ONDANSETRON HYDROCHLORIDE 8 MG/1
8 TABLET, FILM COATED ORAL EVERY 8 HOURS PRN
Qty: 20 TABLET | Refills: 0 | Status: SHIPPED | OUTPATIENT
Start: 2024-02-28

## 2024-04-25 DIAGNOSIS — R11.0 NAUSEA: ICD-10-CM

## 2024-04-25 DIAGNOSIS — F41.9 ANXIETY: ICD-10-CM

## 2024-04-25 RX ORDER — HYDROXYZINE PAMOATE 50 MG/1
50 CAPSULE ORAL 2 TIMES DAILY PRN
Qty: 45 CAPSULE | Refills: 1 | Status: SHIPPED | OUTPATIENT
Start: 2024-04-25

## 2024-04-25 RX ORDER — ONDANSETRON HYDROCHLORIDE 8 MG/1
8 TABLET, FILM COATED ORAL EVERY 8 HOURS PRN
Qty: 20 TABLET | Refills: 0 | Status: SHIPPED | OUTPATIENT
Start: 2024-04-25

## 2024-05-03 ENCOUNTER — OFFICE VISIT (OUTPATIENT)
Dept: FAMILY MEDICINE CLINIC | Age: 39
End: 2024-05-03

## 2024-05-03 VITALS
SYSTOLIC BLOOD PRESSURE: 118 MMHG | HEIGHT: 60 IN | HEART RATE: 83 BPM | BODY MASS INDEX: 30.08 KG/M2 | WEIGHT: 153.2 LBS | DIASTOLIC BLOOD PRESSURE: 76 MMHG | OXYGEN SATURATION: 98 %

## 2024-05-03 DIAGNOSIS — R10.11 RUQ PAIN: Primary | ICD-10-CM

## 2024-05-03 DIAGNOSIS — F33.1 MDD (MAJOR DEPRESSIVE DISORDER), RECURRENT EPISODE, MODERATE (HCC): ICD-10-CM

## 2024-05-03 DIAGNOSIS — D68.51 FACTOR V LEIDEN MUTATION (HCC): ICD-10-CM

## 2024-05-03 LAB
ALBUMIN SERPL-MCNC: 4.6 G/DL (ref 3.4–5)
ALBUMIN/GLOB SERPL: 1.8 {RATIO} (ref 1.1–2.2)
ALP SERPL-CCNC: 83 U/L (ref 40–129)
ALT SERPL-CCNC: 12 U/L (ref 10–40)
ANION GAP SERPL CALCULATED.3IONS-SCNC: 11 MMOL/L (ref 3–16)
AST SERPL-CCNC: 13 U/L (ref 15–37)
BASOPHILS # BLD: 0.1 K/UL (ref 0–0.2)
BASOPHILS NFR BLD: 0.9 %
BILIRUB SERPL-MCNC: 0.4 MG/DL (ref 0–1)
BUN SERPL-MCNC: 8 MG/DL (ref 7–20)
CALCIUM SERPL-MCNC: 9.8 MG/DL (ref 8.3–10.6)
CHLORIDE SERPL-SCNC: 101 MMOL/L (ref 99–110)
CO2 SERPL-SCNC: 27 MMOL/L (ref 21–32)
CREAT SERPL-MCNC: 0.6 MG/DL (ref 0.6–1.1)
DEPRECATED RDW RBC AUTO: 12.4 % (ref 12.4–15.4)
EOSINOPHIL # BLD: 0.2 K/UL (ref 0–0.6)
EOSINOPHIL NFR BLD: 2.4 %
GFR SERPLBLD CREATININE-BSD FMLA CKD-EPI: >90 ML/MIN/{1.73_M2}
GLUCOSE SERPL-MCNC: 83 MG/DL (ref 70–99)
HCT VFR BLD AUTO: 39.3 % (ref 36–48)
HGB BLD-MCNC: 13.9 G/DL (ref 12–16)
LYMPHOCYTES # BLD: 2.2 K/UL (ref 1–5.1)
LYMPHOCYTES NFR BLD: 33.3 %
MCH RBC QN AUTO: 31.3 PG (ref 26–34)
MCHC RBC AUTO-ENTMCNC: 35.4 G/DL (ref 31–36)
MCV RBC AUTO: 88.6 FL (ref 80–100)
MONOCYTES # BLD: 0.4 K/UL (ref 0–1.3)
MONOCYTES NFR BLD: 6 %
NEUTROPHILS # BLD: 3.7 K/UL (ref 1.7–7.7)
NEUTROPHILS NFR BLD: 57.4 %
PLATELET # BLD AUTO: 233 K/UL (ref 135–450)
PMV BLD AUTO: 9.1 FL (ref 5–10.5)
POTASSIUM SERPL-SCNC: 4.2 MMOL/L (ref 3.5–5.1)
PROT SERPL-MCNC: 7.2 G/DL (ref 6.4–8.2)
RBC # BLD AUTO: 4.43 M/UL (ref 4–5.2)
SODIUM SERPL-SCNC: 139 MMOL/L (ref 136–145)
WBC # BLD AUTO: 6.5 K/UL (ref 4–11)

## 2024-05-03 RX ORDER — PREDNISONE 10 MG/1
TABLET ORAL
Qty: 36 TABLET | Refills: 0 | Status: SHIPPED | OUTPATIENT
Start: 2024-05-03 | End: 2024-05-12

## 2024-05-03 SDOH — ECONOMIC STABILITY: FOOD INSECURITY: WITHIN THE PAST 12 MONTHS, THE FOOD YOU BOUGHT JUST DIDN'T LAST AND YOU DIDN'T HAVE MONEY TO GET MORE.: NEVER TRUE

## 2024-05-03 SDOH — ECONOMIC STABILITY: FOOD INSECURITY: WITHIN THE PAST 12 MONTHS, YOU WORRIED THAT YOUR FOOD WOULD RUN OUT BEFORE YOU GOT MONEY TO BUY MORE.: NEVER TRUE

## 2024-05-03 SDOH — ECONOMIC STABILITY: INCOME INSECURITY: HOW HARD IS IT FOR YOU TO PAY FOR THE VERY BASICS LIKE FOOD, HOUSING, MEDICAL CARE, AND HEATING?: NOT HARD AT ALL

## 2024-05-03 ASSESSMENT — ENCOUNTER SYMPTOMS
ABDOMINAL DISTENTION: 0
ABDOMINAL PAIN: 1
BACK PAIN: 1
RESPIRATORY NEGATIVE: 1
VOMITING: 0

## 2024-05-03 NOTE — PROGRESS NOTES
Chief Complaint   Patient presents with    Back Pain    Generalized Body Aches    Headache       HPI:  Priscilla Verma is a 39 y.o. (: 1985) here today   for evaluation of pain, headaches, body aches.    Is prescribed Imitrex for headaches.  Only drinks 2 bottles of water a day    Bodyaches, back pain: MARIA R negative on 2024.  Had elevated CRP on 10/6/2023.  Sed rate, rheumatoid factor, cyclic citral peptide all were negative on 10/6/2023. Pains she is having are same pains she has had for months.     Saw GI after referral from  in 10/2023. GI did liver bx and was neg 2/15/24. 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 Has suleman on 24.     Patient'smedications, allergies, past medical, surgical, social and family histories were reviewed and updated as appropriate.    ROS:  Review of Systems   Constitutional: Negative.    HENT: Negative.     Respiratory: Negative.     Gastrointestinal:  Positive for abdominal pain. Negative for abdominal distention and vomiting.   Genitourinary: Negative.    Musculoskeletal:  Positive for back pain.   Neurological:  Positive for headaches.   Psychiatric/Behavioral: Negative.             Prior to Visit Medications    Medication Sig Taking? Authorizing Provider   predniSONE (DELTASONE) 10 MG tablet Take 3 tablets by mouth 2 times daily for 3 days, THEN 2 tablets 2 times daily for 3 days, THEN 1 tablet 2 times daily for 3 days. Yes Linwood Cheema, APRN - CNP   hydrOXYzine pamoate (VISTARIL) 50 MG capsule Take 1 capsule by mouth 2 times daily as needed for Anxiety Yes Linwood Cheema, APRN - CNP   ondansetron (ZOFRAN) 8 MG tablet Take 1 tablet by mouth every 8 hours as needed for Nausea or Vomiting Yes Linwood Cheema, APRN - CNP   cetirizine (ZYRTEC) 10 MG tablet Take 1 tablet by mouth daily Yes Provider, MD Josselyn   SUMAtriptan (IMITREX) 50 MG tablet Take 1 tablet by mouth once as needed for Migraine  Patient not taking: Reported on

## 2024-06-17 ENCOUNTER — HOSPITAL ENCOUNTER (OUTPATIENT)
Age: 39
Discharge: HOME | End: 2024-06-17
Payer: COMMERCIAL

## 2024-06-17 DIAGNOSIS — R11.0 NAUSEA: ICD-10-CM

## 2024-06-17 LAB
ALBUMIN SERPL-MCNC: 4.2 G/DL (ref 3.4–5)
ALBUMIN/GLOB SERPL: 1.4 {RATIO} (ref 1.1–2.2)
ALP SERPL-CCNC: 69 U/L (ref 40–129)
ALT SERPL-CCNC: 13 U/L (ref 10–40)
ANION GAP SERPL CALCULATED.3IONS-SCNC: 10 MMOL/L (ref 3–16)
AST SERPL-CCNC: 16 U/L (ref 15–37)
BASOPHILS # BLD: 0 K/UL (ref 0–0.2)
BASOPHILS NFR BLD: 0.7 %
BILIRUB SERPL-MCNC: 0.3 MG/DL (ref 0–1)
BUN SERPL-MCNC: 8 MG/DL (ref 7–20)
CALCIUM SERPL-MCNC: 9.1 MG/DL (ref 8.3–10.6)
CHLORIDE SERPL-SCNC: 101 MMOL/L (ref 99–110)
CO2 SERPL-SCNC: 27 MMOL/L (ref 21–32)
CREAT SERPL-MCNC: 0.6 MG/DL (ref 0.6–1.1)
DEPRECATED RDW RBC AUTO: 12.3 % (ref 12.4–15.4)
EOSINOPHIL # BLD: 0.2 K/UL (ref 0–0.6)
EOSINOPHIL NFR BLD: 2.4 %
GFR SERPLBLD CREATININE-BSD FMLA CKD-EPI: >90 ML/MIN/{1.73_M2}
GLUCOSE SERPL-MCNC: 92 MG/DL (ref 70–99)
HCT VFR BLD AUTO: 37.8 % (ref 36–48)
HGB BLD-MCNC: 13.2 G/DL (ref 12–16)
LYMPHOCYTES # BLD: 2.6 K/UL (ref 1–5.1)
LYMPHOCYTES NFR BLD: 39.6 %
MCH RBC QN AUTO: 31.1 PG (ref 26–34)
MCHC RBC AUTO-ENTMCNC: 35 G/DL (ref 31–36)
MCV RBC AUTO: 88.9 FL (ref 80–100)
MONOCYTES # BLD: 0.4 K/UL (ref 0–1.3)
MONOCYTES NFR BLD: 6.3 %
NEUTROPHILS # BLD: 3.4 K/UL (ref 1.7–7.7)
NEUTROPHILS NFR BLD: 51 %
PLATELET # BLD AUTO: 222 K/UL (ref 135–450)
PMV BLD AUTO: 9.8 FL (ref 5–10.5)
POTASSIUM SERPL-SCNC: 3.8 MMOL/L (ref 3.5–5.1)
PROT SERPL-MCNC: 7.2 G/DL (ref 6.4–8.2)
RBC # BLD AUTO: 4.25 M/UL (ref 4–5.2)
SODIUM SERPL-SCNC: 138 MMOL/L (ref 136–145)
WBC # BLD AUTO: 6.7 K/UL (ref 4–11)

## 2024-06-17 PROCEDURE — 80053 COMPREHEN METABOLIC PANEL: CPT

## 2024-06-17 PROCEDURE — 85025 COMPLETE CBC W/AUTO DIFF WBC: CPT

## 2024-06-18 RX ORDER — ONDANSETRON HYDROCHLORIDE 8 MG/1
8 TABLET, FILM COATED ORAL EVERY 8 HOURS PRN
Qty: 20 TABLET | Refills: 0 | Status: SHIPPED | OUTPATIENT
Start: 2024-06-18

## 2024-06-18 NOTE — TELEPHONE ENCOUNTER
Future appt scheduled 0 appt scheduled                       Last appt 05/03/2024      Last Written 04/25/2024      ondansetron (ZOFRAN) 8 MG tablet   #20  0 RF

## 2024-07-01 ENCOUNTER — OFFICE VISIT (OUTPATIENT)
Dept: FAMILY MEDICINE CLINIC | Age: 39
End: 2024-07-01
Payer: COMMERCIAL

## 2024-07-01 VITALS
WEIGHT: 157 LBS | OXYGEN SATURATION: 98 % | BODY MASS INDEX: 30.82 KG/M2 | DIASTOLIC BLOOD PRESSURE: 80 MMHG | HEIGHT: 60 IN | HEART RATE: 104 BPM | SYSTOLIC BLOOD PRESSURE: 122 MMHG

## 2024-07-01 DIAGNOSIS — E66.9 CLASS 1 OBESITY WITH SERIOUS COMORBIDITY AND BODY MASS INDEX (BMI) OF 30.0 TO 30.9 IN ADULT, UNSPECIFIED OBESITY TYPE: Primary | ICD-10-CM

## 2024-07-01 DIAGNOSIS — K76.0 FATTY LIVER: ICD-10-CM

## 2024-07-01 DIAGNOSIS — R53.83 OTHER FATIGUE: ICD-10-CM

## 2024-07-01 LAB
T4 FREE SERPL-MCNC: 1.2 NG/DL (ref 0.9–1.8)
TSH SERPL DL<=0.005 MIU/L-ACNC: 1.35 UIU/ML (ref 0.27–4.2)

## 2024-07-01 PROCEDURE — 99214 OFFICE O/P EST MOD 30 MIN: CPT | Performed by: FAMILY MEDICINE

## 2024-07-01 PROCEDURE — G8427 DOCREV CUR MEDS BY ELIG CLIN: HCPCS | Performed by: FAMILY MEDICINE

## 2024-07-01 PROCEDURE — G8417 CALC BMI ABV UP PARAM F/U: HCPCS | Performed by: FAMILY MEDICINE

## 2024-07-01 PROCEDURE — 1036F TOBACCO NON-USER: CPT | Performed by: FAMILY MEDICINE

## 2024-07-01 ASSESSMENT — ENCOUNTER SYMPTOMS: ABDOMINAL PAIN: 1

## 2024-07-01 NOTE — PROGRESS NOTES
Chief Complaint   Patient presents with    Discuss Medications       HPI:  Priscilla Verma is a 39 y.o. (: 1985) here today to discuss medications.  HPI  Seen by GI due to inc lft and ruq pain.  U/S and CT w/ fatty liver.  Prior bx planned. HIDA scan planned. Still having sig ruq pain.  Occurs randomly.  HIDA to eval for biliary source.  Told to lose weight, w/ goal 10% loss over 6 mo.      Has been trying to inc walking.  Walking 2 x per week.  Swimming as well.  Has sig reduced soda intake.  Has been snacking less.  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  Has cont to gain weight.     Patient's medications, allergies, past medical, surgical, social and family histories were reviewed and updated as appropriate.    ROS:  Review of Systems   Constitutional:  Negative for fever.   Cardiovascular:  Negative for chest pain and palpitations.   Gastrointestinal:  Positive for abdominal pain.           No results found for: \"LABA1C\", \"LDLDIRECT\"    Past Medical History:   Diagnosis Date    Anesthesia complication     decreased B/P     Blood clot in vein     Right ovary    Complication of anesthesia     Low blood pressure    Depression     Endometriosis     Factor V deficiency (HCC)     Heart disease     Hyperlipidemia     no meds    Overactive bladder     SVT (supraventricular tachycardia) (HCC)     Trauma        Family History   Problem Relation Age of Onset    High Blood Pressure Mother     Unknown Mother         HTN, Factor V, Depression, Blood clots    Clotting Disorder Mother     Cancer Father     Unknown Father         Testicular  cancer       Social History     Socioeconomic History    Marital status: Legally      Spouse name: Not on file    Number of children: Not on file    Years of education: Not on file    Highest education level: Not on file   Occupational History    Not on file   Tobacco Use    Smoking status: Never    Smokeless tobacco: Never   Vaping Use    Vaping Use: Never used   Substance

## 2024-07-03 ENCOUNTER — TELEPHONE (OUTPATIENT)
Dept: ADMINISTRATIVE | Age: 39
End: 2024-07-03

## 2024-07-03 NOTE — TELEPHONE ENCOUNTER
Submitted PA for Mounjaro 2.5MG/0.5ML pen-injectors   Via CMM Key: [id number]  STATUS: PENDING.    Follow up done daily; if no decision with in three days we will refax.  If another three days goes by with no decision will call the insurance for status.

## 2024-07-08 NOTE — TELEPHONE ENCOUNTER
In accordance with [facility] rule 5160-26-03, drugs that fall into the following category are not covered by the [place] Medicaid pharmacy program: Drugs for the treatment of obesity. The [facility] Policy for Medical Necessity as posted on the [facility] website was reviewed and per [place] Administrative Code Rule 5160-1-01 (C) and 5160-26-03 (B), a medically necessary service must include: generally accepted standards of medical practice, be clinically appropriate in administration, treatment and outcome and be the lowest cost alternative to effectively treat the condition. Please contact your provider to assist you with other treatment options that might be covered under your benefit package, or other services that might be available through the community.     Please notify patient. Thank you.

## 2024-07-22 DIAGNOSIS — R11.0 NAUSEA: ICD-10-CM

## 2024-07-23 RX ORDER — ONDANSETRON HYDROCHLORIDE 8 MG/1
8 TABLET, FILM COATED ORAL EVERY 8 HOURS PRN
Qty: 20 TABLET | Refills: 0 | Status: SHIPPED | OUTPATIENT
Start: 2024-07-23

## 2024-08-12 ENCOUNTER — OFFICE VISIT (OUTPATIENT)
Dept: FAMILY MEDICINE CLINIC | Age: 39
End: 2024-08-12
Payer: COMMERCIAL

## 2024-08-12 VITALS
OXYGEN SATURATION: 99 % | BODY MASS INDEX: 29.41 KG/M2 | HEIGHT: 60 IN | SYSTOLIC BLOOD PRESSURE: 118 MMHG | DIASTOLIC BLOOD PRESSURE: 68 MMHG | WEIGHT: 149.8 LBS | HEART RATE: 74 BPM

## 2024-08-12 DIAGNOSIS — E66.9 CLASS 1 OBESITY WITH SERIOUS COMORBIDITY AND BODY MASS INDEX (BMI) OF 30.0 TO 30.9 IN ADULT, UNSPECIFIED OBESITY TYPE: ICD-10-CM

## 2024-08-12 DIAGNOSIS — K76.0 FATTY LIVER: ICD-10-CM

## 2024-08-12 DIAGNOSIS — S40.022S: Primary | ICD-10-CM

## 2024-08-12 PROCEDURE — G8427 DOCREV CUR MEDS BY ELIG CLIN: HCPCS | Performed by: FAMILY MEDICINE

## 2024-08-12 PROCEDURE — 1036F TOBACCO NON-USER: CPT | Performed by: FAMILY MEDICINE

## 2024-08-12 PROCEDURE — G8417 CALC BMI ABV UP PARAM F/U: HCPCS | Performed by: FAMILY MEDICINE

## 2024-08-12 PROCEDURE — 99214 OFFICE O/P EST MOD 30 MIN: CPT | Performed by: FAMILY MEDICINE

## 2024-08-12 RX ORDER — CIPROFLOXACIN HYDROCHLORIDE 500 MG/1
TABLET, FILM COATED ORAL
COMMUNITY
Start: 2024-08-09

## 2024-08-12 RX ORDER — TIRZEPATIDE 2.5 MG/.5ML
2.5 INJECTION, SOLUTION SUBCUTANEOUS WEEKLY
Qty: 2 ML | Refills: 2 | Status: SHIPPED | OUTPATIENT
Start: 2024-08-12 | End: 2024-08-12

## 2024-08-12 RX ORDER — TIRZEPATIDE 2.5 MG/.5ML
2.5 INJECTION, SOLUTION SUBCUTANEOUS WEEKLY
Qty: 2 ML | Refills: 2 | Status: SHIPPED | OUTPATIENT
Start: 2024-08-12

## 2024-08-12 ASSESSMENT — ENCOUNTER SYMPTOMS: SHORTNESS OF BREATH: 0

## 2024-08-12 NOTE — PROGRESS NOTES
evident fracture noted on x-rays.  She does have significant pain, especially over the left trapezius muscle and in the wrist and distal forearm.  Discussed range of motion exercises.  Patient has a prescription for ibuprofen.  Call if symptoms fail to improve.  May need repeat imaging or orthopedic referral if ongoing issues    2. Class 1 obesity with serious comorbidity and body mass index (BMI) of 30.0 to 30.9 in adult, unspecified obesity type  Patient had taken Ozempic and did see some weight loss.  She did have some side effects, however.  Patient prefers to try Mounjaro.  She plans to get at a compounding pharmacy.  See prior note regarding fatty liver and right upper quadrant symptoms.  - Tirzepatide (MOUNJARO) 2.5 MG/0.5ML SOPN SC injection; Inject 0.5 mLs into the skin once a week  Dispense: 2 mL; Refill: 2    3. Fatty liver  See above            This document was prepared by a combination of typing and transcription through a voice recognition software.

## 2024-08-14 ENCOUNTER — TELEPHONE (OUTPATIENT)
Dept: FAMILY MEDICINE CLINIC | Age: 39
End: 2024-08-14

## 2024-08-14 DIAGNOSIS — M25.50 PAIN, JOINT, MULTIPLE SITES: Primary | ICD-10-CM

## 2024-08-14 NOTE — TELEPHONE ENCOUNTER
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 She wants to know what you rec?

## 2024-09-13 DIAGNOSIS — R11.0 NAUSEA: ICD-10-CM

## 2024-09-16 RX ORDER — ONDANSETRON 8 MG/1
8 TABLET, FILM COATED ORAL EVERY 8 HOURS PRN
Qty: 20 TABLET | Refills: 0 | Status: SHIPPED | OUTPATIENT
Start: 2024-09-16

## 2024-09-23 ENCOUNTER — TELEPHONE (OUTPATIENT)
Dept: FAMILY MEDICINE CLINIC | Age: 39
End: 2024-09-23

## 2024-09-23 DIAGNOSIS — M25.50 PAIN, JOINT, MULTIPLE SITES: Primary | ICD-10-CM

## 2024-09-24 ENCOUNTER — LAB (OUTPATIENT)
Dept: FAMILY MEDICINE CLINIC | Age: 39
End: 2024-09-24
Payer: COMMERCIAL

## 2024-09-24 DIAGNOSIS — M25.50 PAIN, JOINT, MULTIPLE SITES: ICD-10-CM

## 2024-09-24 PROCEDURE — 36415 COLL VENOUS BLD VENIPUNCTURE: CPT | Performed by: FAMILY MEDICINE

## 2024-09-25 LAB
ALBUMIN SERPL-MCNC: 4.5 G/DL (ref 3.4–5)
ALBUMIN/GLOB SERPL: 1.6 {RATIO} (ref 1.1–2.2)
ALP SERPL-CCNC: 84 U/L (ref 40–129)
ALT SERPL-CCNC: 14 U/L (ref 10–40)
ANA SER QL IA: NEGATIVE
ANION GAP SERPL CALCULATED.3IONS-SCNC: 12 MMOL/L (ref 3–16)
AST SERPL-CCNC: 16 U/L (ref 15–37)
BASOPHILS # BLD: 0 K/UL (ref 0–0.2)
BASOPHILS NFR BLD: 0.6 %
BILIRUB SERPL-MCNC: 0.3 MG/DL (ref 0–1)
BUN SERPL-MCNC: 11 MG/DL (ref 7–20)
CALCIUM SERPL-MCNC: 9.6 MG/DL (ref 8.3–10.6)
CCP IGG SERPL-ACNC: <0.5 U/ML (ref 0–2.9)
CHLORIDE SERPL-SCNC: 101 MMOL/L (ref 99–110)
CO2 SERPL-SCNC: 25 MMOL/L (ref 21–32)
CREAT SERPL-MCNC: 0.7 MG/DL (ref 0.6–1.1)
CRP SERPL-MCNC: <3 MG/L (ref 0–5.1)
DEPRECATED RDW RBC AUTO: 12.4 % (ref 12.4–15.4)
EOSINOPHIL # BLD: 0.2 K/UL (ref 0–0.6)
EOSINOPHIL NFR BLD: 2.5 %
ERYTHROCYTE [SEDIMENTATION RATE] IN BLOOD BY WESTERGREN METHOD: 12 MM/HR (ref 0–20)
GFR SERPLBLD CREATININE-BSD FMLA CKD-EPI: >90 ML/MIN/{1.73_M2}
GLUCOSE SERPL-MCNC: 82 MG/DL (ref 70–99)
HCT VFR BLD AUTO: 38.8 % (ref 36–48)
HGB BLD-MCNC: 13.1 G/DL (ref 12–16)
LYMPHOCYTES # BLD: 3 K/UL (ref 1–5.1)
LYMPHOCYTES NFR BLD: 39.9 %
MCH RBC QN AUTO: 29.7 PG (ref 26–34)
MCHC RBC AUTO-ENTMCNC: 33.7 G/DL (ref 31–36)
MCV RBC AUTO: 88.2 FL (ref 80–100)
MONOCYTES # BLD: 0.4 K/UL (ref 0–1.3)
MONOCYTES NFR BLD: 5.8 %
NEUTROPHILS # BLD: 3.8 K/UL (ref 1.7–7.7)
NEUTROPHILS NFR BLD: 51.2 %
PLATELET # BLD AUTO: 264 K/UL (ref 135–450)
PMV BLD AUTO: 8.9 FL (ref 5–10.5)
POTASSIUM SERPL-SCNC: 3.7 MMOL/L (ref 3.5–5.1)
PROT SERPL-MCNC: 7.4 G/DL (ref 6.4–8.2)
RBC # BLD AUTO: 4.4 M/UL (ref 4–5.2)
RHEUMATOID FACT SER IA-ACNC: <10 IU/ML
SODIUM SERPL-SCNC: 138 MMOL/L (ref 136–145)
WBC # BLD AUTO: 7.5 K/UL (ref 4–11)

## 2024-10-23 DIAGNOSIS — R11.0 NAUSEA: ICD-10-CM

## 2024-10-23 RX ORDER — ONDANSETRON 8 MG/1
8 TABLET, FILM COATED ORAL EVERY 8 HOURS PRN
Qty: 20 TABLET | Refills: 0 | Status: SHIPPED | OUTPATIENT
Start: 2024-10-23

## 2024-11-15 DIAGNOSIS — R11.0 NAUSEA: ICD-10-CM

## 2024-11-15 RX ORDER — ONDANSETRON 8 MG/1
8 TABLET, FILM COATED ORAL EVERY 8 HOURS PRN
Qty: 20 TABLET | Refills: 0 | Status: SHIPPED | OUTPATIENT
Start: 2024-11-15

## 2024-12-13 DIAGNOSIS — R11.0 NAUSEA: ICD-10-CM

## 2024-12-13 RX ORDER — ONDANSETRON 8 MG/1
8 TABLET, FILM COATED ORAL EVERY 8 HOURS PRN
Qty: 20 TABLET | Refills: 0 | Status: SHIPPED | OUTPATIENT
Start: 2024-12-13

## 2025-01-24 ENCOUNTER — OFFICE VISIT (OUTPATIENT)
Dept: FAMILY MEDICINE CLINIC | Age: 40
End: 2025-01-24
Payer: COMMERCIAL

## 2025-01-24 VITALS
WEIGHT: 162 LBS | DIASTOLIC BLOOD PRESSURE: 102 MMHG | BODY MASS INDEX: 31.8 KG/M2 | HEART RATE: 102 BPM | HEIGHT: 60 IN | OXYGEN SATURATION: 98 % | SYSTOLIC BLOOD PRESSURE: 156 MMHG

## 2025-01-24 DIAGNOSIS — F41.9 ANXIETY: Primary | ICD-10-CM

## 2025-01-24 DIAGNOSIS — R41.3 MEMORY CHANGE: ICD-10-CM

## 2025-01-24 DIAGNOSIS — R51.9 NONINTRACTABLE HEADACHE, UNSPECIFIED CHRONICITY PATTERN, UNSPECIFIED HEADACHE TYPE: ICD-10-CM

## 2025-01-24 DIAGNOSIS — R42 DIZZINESS: ICD-10-CM

## 2025-01-24 LAB
ALBUMIN: 4.7 G/DL
ALP BLD-CCNC: 93 U/L
ALT SERPL-CCNC: 22 U/L
ANION GAP SERPL CALCULATED.3IONS-SCNC: 13.9 MMOL/L
AST SERPL-CCNC: 27 U/L
BASOPHILS ABSOLUTE: NORMAL
BASOPHILS RELATIVE PERCENT: 0.9 %
BILIRUB SERPL-MCNC: 0.5 MG/DL (ref 0.1–1.4)
BUN BLDV-MCNC: 10 MG/DL
CALCIUM SERPL-MCNC: 10 MG/DL
CHLORIDE BLD-SCNC: 102 MMOL/L
CO2: 27 MMOL/L
CREAT SERPL-MCNC: 0.7 MG/DL
EOSINOPHILS ABSOLUTE: NORMAL
EOSINOPHILS RELATIVE PERCENT: 1.3 %
GFR, ESTIMATED: 94
GLUCOSE BLD-MCNC: 94 MG/DL
HCT VFR BLD CALC: 40.1 % (ref 36–46)
HEMOGLOBIN: 13.8 G/DL (ref 12–16)
LYMPHOCYTES ABSOLUTE: 2.4 /ΜL
LYMPHOCYTES RELATIVE PERCENT: 26.7 %
MAGNESIUM: 1.7 MG/DL
MCH RBC QN AUTO: 30.5 PG
MCHC RBC AUTO-ENTMCNC: 34.4 G/DL
MCV RBC AUTO: 88.5 FL
MONOCYTES ABSOLUTE: NORMAL
MONOCYTES RELATIVE PERCENT: 5 %
NEUTROPHILS ABSOLUTE: 5.9 /ΜL
NEUTROPHILS RELATIVE PERCENT: 65.8 %
PDW BLD-RTO: 11.9 %
PLATELET # BLD: 282 K/ΜL
PMV BLD AUTO: NORMAL FL
POTASSIUM SERPL-SCNC: 3.9 MMOL/L
RBC # BLD: 4.53 10^6/ΜL
SODIUM BLD-SCNC: 139 MMOL/L
T4 FREE: 1.01
TOTAL PROTEIN: 8.4 G/DL (ref 6.4–8.2)
TSH SERPL DL<=0.05 MIU/L-ACNC: 1.12 UIU/ML
VITAMIN B-12: 446
WBC # BLD: 9 10^3/ML

## 2025-01-24 PROCEDURE — G8417 CALC BMI ABV UP PARAM F/U: HCPCS | Performed by: FAMILY MEDICINE

## 2025-01-24 PROCEDURE — G8427 DOCREV CUR MEDS BY ELIG CLIN: HCPCS | Performed by: FAMILY MEDICINE

## 2025-01-24 PROCEDURE — 1036F TOBACCO NON-USER: CPT | Performed by: FAMILY MEDICINE

## 2025-01-24 PROCEDURE — 99214 OFFICE O/P EST MOD 30 MIN: CPT | Performed by: FAMILY MEDICINE

## 2025-01-24 RX ORDER — VENLAFAXINE HYDROCHLORIDE 37.5 MG/1
37.5 CAPSULE, EXTENDED RELEASE ORAL DAILY
Qty: 30 CAPSULE | Refills: 3 | Status: SHIPPED | OUTPATIENT
Start: 2025-01-24

## 2025-01-24 SDOH — ECONOMIC STABILITY: FOOD INSECURITY: WITHIN THE PAST 12 MONTHS, YOU WORRIED THAT YOUR FOOD WOULD RUN OUT BEFORE YOU GOT MONEY TO BUY MORE.: NEVER TRUE

## 2025-01-24 SDOH — ECONOMIC STABILITY: FOOD INSECURITY: WITHIN THE PAST 12 MONTHS, THE FOOD YOU BOUGHT JUST DIDN'T LAST AND YOU DIDN'T HAVE MONEY TO GET MORE.: NEVER TRUE

## 2025-01-24 NOTE — PROGRESS NOTES
Chief Complaint   Patient presents with    Headache    Dizziness    Tachycardia    Memory Loss       HPI:  Priscilla Verma is a 39 y.o. (: 1985) here today   for headache, dizziness, tachycardia, and issues with memory loss.  HPI  Has been having some issues w/ dizziness, headache, memory loss.  For example, forgetting things at work, forgetting debit card pin, others.      Has been more elizondo.  Some inc stress noted.  Helping care for grandparents, work, parents, others.      Has been taking hydroxyzine at night.  Makes her sleepy. Had been on lexapro, trintellix, effexor in the past.     Hr has been elevated w/ inc stress.      Bp has been ok at home.  High today.      Considering mounjaro for weight loss.  Has had some weight gain.     Patient's medications, allergies, past medical, surgical, social and family histories were reviewed and updated asappropriate.    ROS:  Review of Systems   Constitutional:  Negative for fever.   Neurological:  Positive for dizziness and headaches.   Psychiatric/Behavioral:  Positive for decreased concentration, dysphoric mood and sleep disturbance.            Prior to Visit Medications    Medication Sig Taking? Authorizing Provider   venlafaxine (EFFEXOR XR) 37.5 MG extended release capsule Take 1 capsule by mouth daily Yes Dallin Odell MD   ondansetron (ZOFRAN) 8 MG tablet Take 1 tablet by mouth every 8 hours as needed for Nausea or Vomiting Yes Dallin Odell MD   hydrOXYzine pamoate (VISTARIL) 50 MG capsule Take 1 capsule by mouth 2 times daily as needed for Anxiety Yes Linwood Cheema, APRN - CNP   cetirizine (ZYRTEC) 10 MG tablet Take 1 tablet by mouth daily Yes ProviderJosselyn MD   Tirzepatide (MOUNJARO) 2.5 MG/0.5ML SOPN SC injection Inject 0.5 mLs into the skin once a week  Patient not taking: Reported on 2025  Dallin Odell MD       Allergies   Allergen Reactions    Dilaudid [Hydromorphone Hcl] Other (See Comments)     tachycardia

## 2025-01-28 NOTE — RESULT ENCOUNTER NOTE
CBC essentially normal.  CMP essentially normal.  TSH normal.  B12 and magnesium both low normal.  May benefit from supplementation of both of these.  Would recommend B12 1000 mcg daily and magnesium glycinate daily as well

## 2025-02-03 DIAGNOSIS — R11.0 NAUSEA: ICD-10-CM

## 2025-02-03 RX ORDER — ONDANSETRON 8 MG/1
8 TABLET, FILM COATED ORAL EVERY 8 HOURS PRN
Qty: 20 TABLET | Refills: 0 | Status: SHIPPED | OUTPATIENT
Start: 2025-02-03

## 2025-02-03 NOTE — TELEPHONE ENCOUNTER
Refill Request     CONFIRM preferrred pharmacy with the patient.    If Mail Order Rx - Pend for 90 day refill.      Last Seen: Last Seen Department: 1/24/2025  Last Seen by PCP: 1/24/2025    Last Written: 12/13/2024    If no future appointment scheduled, route STAFF MESSAGE with patient name to the  Pool for scheduling.      Next Appointment:   No future appointments.    Message sent to  to schedule appt with patient?  NO      Requested Prescriptions     Pending Prescriptions Disp Refills    ondansetron (ZOFRAN) 8 MG tablet 20 tablet 0     Sig: Take 1 tablet by mouth every 8 hours as needed for Nausea or Vomiting

## 2025-03-12 DIAGNOSIS — R11.0 NAUSEA: ICD-10-CM

## 2025-03-12 RX ORDER — ONDANSETRON 8 MG/1
8 TABLET, FILM COATED ORAL EVERY 8 HOURS PRN
Qty: 20 TABLET | Refills: 0 | OUTPATIENT
Start: 2025-03-12

## 2025-06-17 ENCOUNTER — TRANSCRIBE ORDERS (OUTPATIENT)
Dept: ADMISSION | Age: 40
End: 2025-06-17

## 2025-06-17 ENCOUNTER — HOSPITAL ENCOUNTER (OUTPATIENT)
Dept: LAB | Age: 40
Discharge: HOME | End: 2025-06-17
Attending: INTERNAL MEDICINE
Payer: COMMERCIAL

## 2025-06-17 ENCOUNTER — HOSPITAL ENCOUNTER (OUTPATIENT)
Age: 40
Discharge: HOME | End: 2025-06-19
Attending: INTERNAL MEDICINE
Payer: COMMERCIAL

## 2025-06-17 DIAGNOSIS — M79.602 LEFT ARM PAIN: ICD-10-CM

## 2025-06-17 DIAGNOSIS — M25.50 PAIN IN JOINT, MULTIPLE SITES: ICD-10-CM

## 2025-06-17 DIAGNOSIS — M25.50 PAIN IN JOINT, MULTIPLE SITES: Primary | ICD-10-CM

## 2025-06-17 DIAGNOSIS — D68.51 FACTOR V LEIDEN MUTATION: ICD-10-CM

## 2025-06-17 DIAGNOSIS — R60.0 EDEMA OF LEFT UPPER ARM: ICD-10-CM

## 2025-06-17 LAB
BILIRUB UR QL STRIP.AUTO: NEGATIVE
CLARITY UR: CLEAR
COLOR UR: YELLOW
CREAT UR-MCNC: 73.1 MG/DL (ref 28–259)
GLUCOSE UR STRIP.AUTO-MCNC: NEGATIVE MG/DL
HGB UR QL STRIP.AUTO: NEGATIVE
KETONES UR STRIP.AUTO-MCNC: NEGATIVE MG/DL
LEUKOCYTE ESTERASE UR QL STRIP.AUTO: NEGATIVE
NITRITE UR QL STRIP.AUTO: NEGATIVE
PH UR STRIP.AUTO: 6.5 [PH] (ref 5–8)
PROT UR STRIP.AUTO-MCNC: NEGATIVE MG/DL
PROT UR-MCNC: 7.9 MG/DL
PROT/CREAT UR-RTO: 0.1 MG/DL
SP GR UR STRIP.AUTO: 1.01 (ref 1–1.03)
UA DIPSTICK W REFLEX MICRO PNL UR: NORMAL
URN SPEC COLLECT METH UR: NORMAL
UROBILINOGEN UR STRIP-ACNC: 0.2 E.U./DL

## 2025-06-17 PROCEDURE — 86256 FLUORESCENT ANTIBODY TITER: CPT

## 2025-06-17 PROCEDURE — 82085 ASSAY OF ALDOLASE: CPT

## 2025-06-17 PROCEDURE — 86235 NUCLEAR ANTIGEN ANTIBODY: CPT

## 2025-06-17 PROCEDURE — 93971 EXTREMITY STUDY: CPT

## 2025-06-17 PROCEDURE — 86038 ANTINUCLEAR ANTIBODIES: CPT

## 2025-06-17 PROCEDURE — 84156 ASSAY OF PROTEIN URINE: CPT

## 2025-06-17 PROCEDURE — 81003 URINALYSIS AUTO W/O SCOPE: CPT

## 2025-06-17 PROCEDURE — 83516 IMMUNOASSAY NONANTIBODY: CPT

## 2025-06-17 PROCEDURE — 86200 CCP ANTIBODY: CPT

## 2025-06-17 PROCEDURE — 36415 COLL VENOUS BLD VENIPUNCTURE: CPT

## 2025-06-17 PROCEDURE — 86039 ANTINUCLEAR ANTIBODIES (ANA): CPT

## 2025-06-17 PROCEDURE — 86225 DNA ANTIBODY NATIVE: CPT

## 2025-06-17 PROCEDURE — 84182 PROTEIN WESTERN BLOT TEST: CPT

## 2025-06-17 PROCEDURE — 93971 EXTREMITY STUDY: CPT | Performed by: SURGERY

## 2025-06-17 PROCEDURE — 82570 ASSAY OF URINE CREATININE: CPT

## 2025-06-18 LAB
ANA SER QL IA: NEGATIVE
CCP IGG SERPL-ACNC: <0.5 U/ML (ref 0–2.9)
DSDNA AB SER-ACNC: <1 IU/ML (ref 0–9)
ENA JO1 AB SER IA-ACNC: <0.2 AI (ref 0–0.9)
ENA RNP AB SER IA-ACNC: <0.2 AI (ref 0–0.9)
ENA SM AB SER IA-ACNC: <0.2 AI (ref 0–0.9)
ENA SS-A AB SER IA-ACNC: <0.2 AI (ref 0–0.9)
ENA SS-B AB SER IA-ACNC: <0.2 AI (ref 0–0.9)

## 2025-06-19 LAB — ALDOLASE SERPL-CCNC: 4.8 U/L (ref 1.2–7.6)

## 2025-06-20 ENCOUNTER — RESULTS FOLLOW-UP (OUTPATIENT)
Dept: FAMILY MEDICINE CLINIC | Age: 40
End: 2025-06-20

## 2025-06-20 LAB
ANA PATTERN: ABNORMAL
ANA TITER: ABNORMAL
DSDNA IGG TITR SER CLIF: ABNORMAL {TITER}

## 2025-06-24 LAB — MISCELLANEOUS LAB TEST ORDER: ABNORMAL
